# Patient Record
Sex: MALE | Race: WHITE | NOT HISPANIC OR LATINO | Employment: OTHER | URBAN - METROPOLITAN AREA
[De-identification: names, ages, dates, MRNs, and addresses within clinical notes are randomized per-mention and may not be internally consistent; named-entity substitution may affect disease eponyms.]

---

## 2017-01-10 DIAGNOSIS — I48.20 CHRONIC ATRIAL FIBRILLATION: ICD-10-CM

## 2017-01-10 RX ORDER — RIVAROXABAN 20 MG/1
TABLET, FILM COATED ORAL
Qty: 90 TABLET | Refills: 0 | OUTPATIENT
Start: 2017-01-10

## 2017-01-13 ENCOUNTER — TELEPHONE (OUTPATIENT)
Dept: CARDIOLOGY | Facility: CLINIC | Age: 82
End: 2017-01-13

## 2017-01-13 NOTE — TELEPHONE ENCOUNTER
Pt may proceed with flap procedure.  May hold Xarelto x 72 hours for procedure.  No other special precautions.    Dr Stewart

## 2017-01-13 NOTE — TELEPHONE ENCOUNTER
Patient needing clearance for gingival flap procedure. Does patient require premed antibiotic treatment? Does patient need to hold blood thinner? If so, how long? Are there any precautions that need to be taken prior to/post procedure? Please advise?

## 2017-01-14 DIAGNOSIS — I48.20 CHRONIC ATRIAL FIBRILLATION: ICD-10-CM

## 2017-01-14 RX ORDER — RIVAROXABAN 20 MG/1
TABLET, FILM COATED ORAL
Qty: 90 TABLET | Refills: 3 | Status: SHIPPED | OUTPATIENT
Start: 2017-01-14 | End: 2017-01-17 | Stop reason: SDUPTHER

## 2017-01-17 DIAGNOSIS — I48.20 CHRONIC ATRIAL FIBRILLATION: ICD-10-CM

## 2017-01-17 NOTE — TELEPHONE ENCOUNTER
----- Message from Lori Dahl sent at 1/17/2017 12:33 PM CST -----  Contact: dena wife   Rx for xeralto   .  [facility] [address] [place] [address] [address] [address] AT [facility] of  [address] [facility]  [address] [address] [address]  Phone: [phone] Fax: [phone]      Call back

## 2017-01-25 ENCOUNTER — OFFICE VISIT (OUTPATIENT)
Dept: INTERNAL MEDICINE | Facility: CLINIC | Age: 82
End: 2017-01-25
Payer: MEDICARE

## 2017-01-25 VITALS
HEART RATE: 83 BPM | OXYGEN SATURATION: 96 % | DIASTOLIC BLOOD PRESSURE: 82 MMHG | WEIGHT: 152.75 LBS | SYSTOLIC BLOOD PRESSURE: 126 MMHG | BODY MASS INDEX: 23.15 KG/M2 | HEIGHT: 68 IN

## 2017-01-25 DIAGNOSIS — I48.20 ATRIAL FIBRILLATION, CHRONIC: Chronic | ICD-10-CM

## 2017-01-25 DIAGNOSIS — I70.1 RENAL ARTERY ATHEROSCLEROSIS, BILATERAL: ICD-10-CM

## 2017-01-25 DIAGNOSIS — R41.3 MEMORY DEFICIT: ICD-10-CM

## 2017-01-25 DIAGNOSIS — M47.816 LUMBAR ARTHROPATHY: ICD-10-CM

## 2017-01-25 DIAGNOSIS — Z79.01 CHRONIC ANTICOAGULATION: Chronic | ICD-10-CM

## 2017-01-25 DIAGNOSIS — I25.83 CORONARY ARTERY DISEASE DUE TO LIPID RICH PLAQUE: Chronic | ICD-10-CM

## 2017-01-25 DIAGNOSIS — Z00.00 ENCOUNTER FOR PREVENTIVE HEALTH EXAMINATION: Primary | ICD-10-CM

## 2017-01-25 DIAGNOSIS — I77.9 BILATERAL CAROTID ARTERY DISEASE: Chronic | ICD-10-CM

## 2017-01-25 DIAGNOSIS — M81.0 OSTEOPOROSIS: ICD-10-CM

## 2017-01-25 DIAGNOSIS — I10 ESSENTIAL HYPERTENSION: Chronic | ICD-10-CM

## 2017-01-25 DIAGNOSIS — I70.0 AORTO-ILIAC ATHEROSCLEROSIS: Chronic | ICD-10-CM

## 2017-01-25 DIAGNOSIS — J84.10 CALCIFIED GRANULOMA OF LUNG: ICD-10-CM

## 2017-01-25 DIAGNOSIS — I25.10 CORONARY ARTERY DISEASE DUE TO LIPID RICH PLAQUE: Chronic | ICD-10-CM

## 2017-01-25 DIAGNOSIS — E78.2 MIXED HYPERLIPIDEMIA: Chronic | ICD-10-CM

## 2017-01-25 DIAGNOSIS — N40.1 BENIGN NON-NODULAR PROSTATIC HYPERPLASIA WITH LOWER URINARY TRACT SYMPTOMS: ICD-10-CM

## 2017-01-25 DIAGNOSIS — I70.8 AORTO-ILIAC ATHEROSCLEROSIS: Chronic | ICD-10-CM

## 2017-01-25 DIAGNOSIS — F32.89 OTHER DEPRESSION: ICD-10-CM

## 2017-01-25 DIAGNOSIS — R91.1 PULMONARY NODULE: ICD-10-CM

## 2017-01-25 DIAGNOSIS — I51.7 LEFT ATRIAL ENLARGEMENT: ICD-10-CM

## 2017-01-25 PROCEDURE — 3074F SYST BP LT 130 MM HG: CPT | Mod: S$GLB,,, | Performed by: PHYSICIAN ASSISTANT

## 2017-01-25 PROCEDURE — 99499 UNLISTED E&M SERVICE: CPT | Mod: S$GLB,,, | Performed by: PHYSICIAN ASSISTANT

## 2017-01-25 PROCEDURE — 3079F DIAST BP 80-89 MM HG: CPT | Mod: S$GLB,,, | Performed by: PHYSICIAN ASSISTANT

## 2017-01-25 PROCEDURE — 99999 PR PBB SHADOW E&M-EST. PATIENT-LVL III: CPT | Mod: PBBFAC,,, | Performed by: PHYSICIAN ASSISTANT

## 2017-01-25 PROCEDURE — G0439 PPPS, SUBSEQ VISIT: HCPCS | Mod: S$GLB,,, | Performed by: PHYSICIAN ASSISTANT

## 2017-01-25 NOTE — PATIENT INSTRUCTIONS
Counseling and Referral of Other Preventative  (Italic type indicates deductible and co-insurance are waived)    Patient Name: Lexa Hammond  Today's Date: 1/25/2017      SERVICE LIMITATIONS RECOMMENDATION    Vaccines    · Pneumococcal (once after 65)    · Influenza (annually)    · Hepatitis B (if medium/high risk)    · Prevnar 13      Hepatitis B medium/high risk factors:       - End-stage renal disease       - Hemophiliacs who received Factor VII or         IX concentrates       - Clients of institutions for the mentally             retarded       - Persons who live in the same house as          a HepB carrier       - Homosexual men       - Illicit injectable drug abusers     Pneumococcal: Recommended to patient, declined     Influenza: Recommended to patient, declined     Hepatitis B: N/A Follow with PCP     Prevnar 13: Recommended to patient, declined    Prostate cancer screening (annually to age 75)     Prostate specific antigen (PSA) Shared decision making with Provider. Sometimes a co-pay may be required if the patient decides to have this test. The USPSTF no longer recommends prostate cancer screening routinely in medicine: Follow with Dr. Simpson    Colorectal cancer screening (to age 75)    · Fecal occult blood test (annual)  · Flexible sigmoidoscopy (5y)  · Screening colonoscopy (10y)  · Barium enema   Last done 1/22/13, recommend to repeat every 5  years. Discuss with PCP and GI    Diabetes self-management training (no USPSTF recommendations)  Requires referral by treating physician for patient with diabetes or renal disease. 10 hours of initial DSMT sessions of no less than 30 minutes each in a continuous 12-month period. 2 hours of follow-up DSMT in subsequent years.  Continue to follow with PCP    Glaucoma screening (no USPSTF recommendation)  Diabetes mellitus, family history   , age 50 or over    American, age 65 or over  Continue to follow with Dr. Woody Guzman  nutrition therapy for diabetes or renal disease (no recommended schedule)  Requires referral by treating physician for patient with diabetes or renal disease or kidney transplant within the past 3 years.  Can be provided in same year as diabetes self-management training (DSMT), and CMS recommends medical nutrition therapy take place after DSMT. Up to 3 hours for initial year and 2 hours in subsequent years.  Continue to follow with PCP    Cardiovascular screening blood tests (every 5 years)  · Fasting lipid panel  Order as a panel if possible  Continue to follow with PCP    Diabetes screening tests (at least every 3 years, Medicare covers annually or at 6-month intervals for prediabetic patients)  · Fasting blood sugar (FBS) or glucose tolerance test (GTT)  Patient must be diagnosed with one of the following:       - Hypertension       - Dyslipidemia       - Obesity (BMI 30kg/m2)       - Previous elevated impaired FBS or GTT       ... or any two of the following:       - Overweight (BMI 25 but <30)       - Family history of diabetes       - Age 65 or older       - History of gestational diabetes or birth of baby weighing more than 9 pounds  Continue to follow with PCP    Abdominal aortic aneurysm screening (once)  · Sonogram   Limited to patients who meet one of the following criteria:       - Men who are 65-75 years old and have smoked more than 100 cigarette in their lifetime       - Anyone with a family history of abdominal aortic aneurysm       - Anyone recommended for screening by the USPSTF  Continue to follow with PCP    HIV screening (annually for increased risk patients)  · HIV-1 and HIV-2 by EIA, or ZACH, rapid antibody test or oral mucosa transudate  Patients must be at increased risk for HIV infection per USPSTF guidelines or pregnant. Tests covered annually for patient at increased risk or as requested by the patient. Pregnant patients may receive up to 3 tests during pregnancy.  Risks discussed,  screening is not recommended    Smoking cessation counseling (up to 8 sessions per year)  Patients must be asymptomatic of tobacco-related conditions to receive as a preventative service.  does not smoke    Subsequent annual wellness visit  At least 12 months since last AWV  Return in one year     The following information is provided to all patients.  This information is to help you find resources for any of the problems found today that may be affecting your health:                Living healthy guide: www.[facility].[place].[facility]      Understanding Diabetes: www.diabetes.org      Eating healthy: www.cdc.gov/healthyweight      [facility] home safety checklist: www.cdc.gov/steadi/patient.html      Agency on Aging: www.goea.[place]      Alcoholics anonymous (AA): www.aa.org      Physical Activity: www.linda.nih.gov/bz0eydv      Tobacco use: www.quitwithusla.org

## 2017-01-25 NOTE — MR AVS SNAPSHOT
[facility]  [address] [facility] [address] [place] [address]  Phone: [phone]  Fax: [phone]                  Lexa Hammond   2017 9:00 AM   Office Visit    Description:  Male : 1935   Provider:  Tello Holland PA-C   Department:  [facility]           Reason for Visit     Health Risk Assessment           Diagnoses this Visit        Comments    Encounter for preventive health examination    -  Primary     Atrial fibrillation, chronic         Essential hypertension         Bilateral carotid artery disease         Coronary artery disease due to lipid rich plaque         Pulmonary nodule         Aorto-iliac atherosclerosis         Renal artery atherosclerosis, bilateral         Osteoporosis         Calcified granuloma of lung         Lumbar arthropathy         Benign non-nodular prostatic hyperplasia with lower urinary tract symptoms         Memory deficit         Urinary complication         Mixed hyperlipidemia         Chronic anticoagulation                To Do List           Future Appointments        Provider Department Dept Phone    2017 8:00 AM Matthew Stewart MD [facility] Cardiology [phone]    3/6/2017 9:15 AM Lencho Cooper OD [facility] Ophthalmology [phone]    10/25/2017 8:20 AM Ren Guevara IV, MD [facility] Urology [phone]      Goals (5 Years of Data)     None      [facility]     [facility] Care [facility] -  Assistance  Registered nurses in the [facility] provide clinical advisement, health education, appointment booking, and other advisory services.  Call for this free service at [phone].             Medications           Message regarding Medications     Verify the changes and/or additions to your medication regime listed below are the same as discussed with your clinician today.  If any of these changes or additions are incorrect, please notify your healthcare provider.        STOP taking these  "medications     tamsulosin (FLOMAX) 0.4 mg Cp24 Take 1 capsule (0.4 mg total) by mouth once daily. 1 Capsule, Sust. Release 24 hr Oral Every day           Verify that the below list of medications is an accurate representation of the medications you are currently taking.  If none reported, the list may be blank. If incorrect, please contact your healthcare provider. Carry this list with you in case of emergency.           Current Medications     amlodipine (NORVASC) 5 MG tablet Take 1.5 tablets (7.5 mg total) by mouth once daily.    aspirin (ECOTRIN) 81 MG EC tablet Take 81 mg by mouth once daily.    co-enzyme Q-10 30 mg capsule Take 30 mg by mouth once daily.     ibandronate (BONIVA) 150 mg tablet TAKE 1 TABLET BY MOUTH ONCE MONTHLY ON AN EMPTY STOMACH AND STAY UPRIGHT FOR 30 MINUTES AFTER    multivitamin with minerals tablet Take 1 tablet by mouth once daily.    rivaroxaban (XARELTO) 20 mg Tab Take 1 tablet (20 mg total) by mouth once daily.    vitamin D 1000 units Tab Take 185 mg by mouth once daily.    betamethasone valerate 0.1% (VALISONE) 0.1 % Crea Apply topically 2 (two) times daily.    incontinence pad, liner, disp Pads            Clinical Reference Information           Vital Signs - Last Recorded  Most recent update: 1/25/2017  8:15 AM by Oralia Donald MA    BP Pulse Ht Wt SpO2 BMI    126/82 83 5' 8" (1.727 m) 69.3 kg (152 lb 12.5 oz) 96% 23.23 kg/m2      Blood Pressure          Most Recent Value    BP  126/82      Allergies as of 1/25/2017     Coumadin [Warfarin]      Immunizations Administered on Date of Encounter - 1/25/2017     None      Instructions      Counseling and Referral of Other Preventative  (Italic type indicates deductible and co-insurance are waived)    Patient Name: Lexa Hammond  Today's Date: 1/25/2017      SERVICE LIMITATIONS RECOMMENDATION    Vaccines    · Pneumococcal (once after 65)    · Influenza (annually)    · Hepatitis B (if medium/high risk)    · Prevnar 13      " Hepatitis B medium/high risk factors:       - End-stage renal disease       - Hemophiliacs who received Factor VII or         IX concentrates       - Clients of institutions for the mentally             retarded       - Persons who live in the same house as          a HepB carrier       - Homosexual men       - Illicit injectable drug abusers     Pneumococcal: Recommended to patient, declined     Influenza: Recommended to patient, declined     Hepatitis B: N/A Follow with PCP     Prevnar 13: Recommended to patient, declined    Prostate cancer screening (annually to age 75)     Prostate specific antigen (PSA) Shared decision making with Provider. Sometimes a co-pay may be required if the patient decides to have this test. The USPSTF no longer recommends prostate cancer screening routinely in medicine: Follow with Dr. Simpson    Colorectal cancer screening (to age 75)    · Fecal occult blood test (annual)  · Flexible sigmoidoscopy (5y)  · Screening colonoscopy (10y)  · Barium enema   Last done 1/22/13, recommend to repeat every 5  years. Discuss with PCP and GI    Diabetes self-management training (no USPSTF recommendations)  Requires referral by treating physician for patient with diabetes or renal disease. 10 hours of initial DSMT sessions of no less than 30 minutes each in a continuous 12-month period. 2 hours of follow-up DSMT in subsequent years.  Continue to follow with PCP    Glaucoma screening (no USPSTF recommendation)  Diabetes mellitus, family history   , age 50 or over    American, age 65 or over  Continue to follow with Dr. Mckay    Medical nutrition therapy for diabetes or renal disease (no recommended schedule)  Requires referral by treating physician for patient with diabetes or renal disease or kidney transplant within the past 3 years.  Can be provided in same year as diabetes self-management training (DSMT), and CMS recommends medical nutrition therapy take place after  DSMT. Up to 3 hours for initial year and 2 hours in subsequent years.  Continue to follow with PCP    Cardiovascular screening blood tests (every 5 years)  · Fasting lipid panel  Order as a panel if possible  Continue to follow with PCP    Diabetes screening tests (at least every 3 years, Medicare covers annually or at 6-month intervals for prediabetic patients)  · Fasting blood sugar (FBS) or glucose tolerance test (GTT)  Patient must be diagnosed with one of the following:       - Hypertension       - Dyslipidemia       - Obesity (BMI 30kg/m2)       - Previous elevated impaired FBS or GTT       ... or any two of the following:       - Overweight (BMI 25 but <30)       - Family history of diabetes       - Age 65 or older       - History of gestational diabetes or birth of baby weighing more than 9 pounds  Continue to follow with PCP    Abdominal aortic aneurysm screening (once)  · Sonogram   Limited to patients who meet one of the following criteria:       - Men who are 65-75 years old and have smoked more than 100 cigarette in their lifetime       - Anyone with a family history of abdominal aortic aneurysm       - Anyone recommended for screening by the USPSTF  Continue to follow with PCP    HIV screening (annually for increased risk patients)  · HIV-1 and HIV-2 by EIA, or ZACH, rapid antibody test or oral mucosa transudate  Patients must be at increased risk for HIV infection per USPSTF guidelines or pregnant. Tests covered annually for patient at increased risk or as requested by the patient. Pregnant patients may receive up to 3 tests during pregnancy.  Risks discussed, screening is not recommended    Smoking cessation counseling (up to 8 sessions per year)  Patients must be asymptomatic of tobacco-related conditions to receive as a preventative service.  does not smoke    Subsequent annual wellness visit  At least 12 months since last AWV  Return in one year     The following information is provided to all  patients.  This information is to help you find resources for any of the problems found today that may be affecting your health:                Living healthy guide: www.[facility].[place].[facility]      Understanding Diabetes: www.diabetes.org      Eating healthy: www.cdc.gov/healthyweight      CDC home safety checklist: www.cdc.gov/steadi/patient.html      Agency on Aging: www.goea.[place].[facility]      Alcoholics anonymous (AA): www.aa.org      Physical Activity: www.linda.nih.gov/iu8nuyk      Tobacco use: www.quitwithusla.org
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
## 2017-02-20 ENCOUNTER — OFFICE VISIT (OUTPATIENT)
Dept: CARDIOLOGY | Facility: CLINIC | Age: 82
End: 2017-02-20
Payer: MEDICARE

## 2017-02-20 VITALS
BODY MASS INDEX: 23.79 KG/M2 | SYSTOLIC BLOOD PRESSURE: 140 MMHG | DIASTOLIC BLOOD PRESSURE: 76 MMHG | HEIGHT: 68 IN | WEIGHT: 156.94 LBS

## 2017-02-20 DIAGNOSIS — I48.20 ATRIAL FIBRILLATION, CHRONIC: Primary | Chronic | ICD-10-CM

## 2017-02-20 DIAGNOSIS — R94.31 ABNORMAL ECG: Chronic | ICD-10-CM

## 2017-02-20 DIAGNOSIS — I10 ESSENTIAL HYPERTENSION: Chronic | ICD-10-CM

## 2017-02-20 DIAGNOSIS — I25.83 CORONARY ARTERY DISEASE DUE TO LIPID RICH PLAQUE: Chronic | ICD-10-CM

## 2017-02-20 DIAGNOSIS — I48.20 CHRONIC ATRIAL FIBRILLATION: ICD-10-CM

## 2017-02-20 DIAGNOSIS — I77.9 BILATERAL CAROTID ARTERY DISEASE: Chronic | ICD-10-CM

## 2017-02-20 DIAGNOSIS — Z79.01 CHRONIC ANTICOAGULATION: Chronic | ICD-10-CM

## 2017-02-20 DIAGNOSIS — I25.10 CORONARY ARTERY DISEASE DUE TO LIPID RICH PLAQUE: Chronic | ICD-10-CM

## 2017-02-20 DIAGNOSIS — I25.10 ATHEROSCLEROTIC HEART DISEASE OF NATIVE CORONARY ARTERY WITHOUT ANGINA PECTORIS: ICD-10-CM

## 2017-02-20 DIAGNOSIS — E78.2 MIXED HYPERLIPIDEMIA: Chronic | ICD-10-CM

## 2017-02-20 DIAGNOSIS — I25.9 CHRONIC ISCHEMIC HEART DISEASE: ICD-10-CM

## 2017-02-20 DIAGNOSIS — I36.1 NON-RHEUMATIC TRICUSPID VALVE INSUFFICIENCY: ICD-10-CM

## 2017-02-20 DIAGNOSIS — I70.0 AORTO-ILIAC ATHEROSCLEROSIS: Chronic | ICD-10-CM

## 2017-02-20 DIAGNOSIS — I70.8 AORTO-ILIAC ATHEROSCLEROSIS: Chronic | ICD-10-CM

## 2017-02-20 PROCEDURE — 3078F DIAST BP <80 MM HG: CPT | Mod: S$GLB,,, | Performed by: INTERNAL MEDICINE

## 2017-02-20 PROCEDURE — 1159F MED LIST DOCD IN RCRD: CPT | Mod: S$GLB,,, | Performed by: INTERNAL MEDICINE

## 2017-02-20 PROCEDURE — 93000 ELECTROCARDIOGRAM COMPLETE: CPT | Mod: S$GLB,,, | Performed by: NUCLEAR MEDICINE

## 2017-02-20 PROCEDURE — 99999 PR PBB SHADOW E&M-EST. PATIENT-LVL III: CPT | Mod: PBBFAC,,, | Performed by: INTERNAL MEDICINE

## 2017-02-20 PROCEDURE — 99499 UNLISTED E&M SERVICE: CPT | Mod: S$GLB,,, | Performed by: INTERNAL MEDICINE

## 2017-02-20 PROCEDURE — 1126F AMNT PAIN NOTED NONE PRSNT: CPT | Mod: S$GLB,,, | Performed by: INTERNAL MEDICINE

## 2017-02-20 PROCEDURE — 99214 OFFICE O/P EST MOD 30 MIN: CPT | Mod: S$GLB,,, | Performed by: INTERNAL MEDICINE

## 2017-02-20 PROCEDURE — 3077F SYST BP >= 140 MM HG: CPT | Mod: S$GLB,,, | Performed by: INTERNAL MEDICINE

## 2017-02-20 PROCEDURE — 1157F ADVNC CARE PLAN IN RCRD: CPT | Mod: S$GLB,,, | Performed by: INTERNAL MEDICINE

## 2017-02-20 NOTE — PROGRESS NOTES
Subjective:    Patient ID:  Lexa Hammond is a 81 y.o. male who presents for evaluation of Coronary Artery Disease (6 month followup); Hyperlipidemia; Hypertension; Carotid Artery Disease; and Atrial Fibrillation      HPI Mr. Hammond returns for f/u.   His current medical conditions include HTN, chronic atrial fibrillation, carotid artery disease. He had a CVA 2002. He had a R CEA by Dr. Angeles 12/09. He is a nonsmoker.  S/p  LHC 7/11 showed calcified CAD. Medical mgt recommended.   He did not tolerate Ranexa (rash). He has been intolerant of many meds to include many statins. Did not tolerate Imdur with headaches. He also stopped Metoprolol and Pravastatin for dizziness.   Pt here for f/u.  CAD is stable.  He reports no anginal sxs.  Denies cp or dyspnea.  Stress mpi June 2016 shows no ischemia.  Permanent a fib is stable.  He reports no dizziness.  No palpitations.    No abnl bleeding on xarelto.  ecg today has been reviewed and shows a fib with controlled VR, inferior infarct noted on old ecgs, low precordial r waves.  HTN controlled on current meds.  No associated sxs.  He is walking for exercise daily.  Carotid disease is stable.  No tia/cva sxs.  Compliant with meds.  Somewhat mindful of diet.  Lipids well controlled on diet.  PAD stable.  No claudication sxs.    Patient Active Problem List   Diagnosis    Benign prostatic hyperplasia with lower urinary tract symptoms    Abnormal ECG    Atrial fibrillation, chronic    Carotid artery disease    Coronary artery disease    ZACK (obstructive sleep apnea)    HTN (hypertension)    Osteoporosis    Mixed hyperlipidemia    Chronic anticoagulation    Aorto-iliac atherosclerosis    Renal artery atherosclerosis, bilateral    Tricuspid regurgitation    Pulmonary nodule    Abnormal stress test    Chronic ischemic heart disease    Laceration    Memory deficit    Depression    Calcified granuloma of lung    Lumbar arthropathy     Past Medical History    Diagnosis Date    Abnormal ECG 6/24/2013    Angina pectoris     Anticoagulant long-term use     Atrial fibrillation, chronic 6/24/2013      Cardioversion successful for 1 month only    Benign hematuria      ingerman 03    BPH (benign prostatic hyperplasia)      munoz    CAD (coronary artery disease)      burgos    Carotid artery disease 6/24/2013     olinde    Chronic ischemic heart disease 1/13/2016    Colon polyp 4/26/2015     Tubular adenoma 10/3/2007     Depression 10/7/2016    Diaphragmatic hernia without obstruction and without gangrene 4/26/2015     CT-7/8/13: Small hernia is noted posteromedially the right hemidiaphragm with some intrathoracic protrusion of abdominal fat.     Diverticulosis 4/26/2015 1/22/13 colo.     Hemiparesis and alteration of sensations as late effects of stroke      CVA 2002     HTN (hypertension)     Hyperlipidemia     Nephrolithiasis 3/9/10      left renal nonobstructing stone, Hxstones  every 20 years. passed.    Obstructive sleep apnea      dr berry    Osteoporosis, unspecified     Peripheral vascular disease 1/13/2016    Renal cyst, right 7/18/13     CT documented, known before [facility]    Stroke      right sided weakness    Trouble in sleeping     Urinary incontinence      occasional, and with stress/sneeze    Vertebral fracture 7/1/13     CT -indeterminate age L1 superior endplate comp fx.       Current Outpatient Prescriptions:     amlodipine (NORVASC) 5 MG tablet, Take 1.5 tablets (7.5 mg total) by mouth once daily., Disp: 135 tablet, Rfl: 3    aspirin (ECOTRIN) 81 MG EC tablet, Take 81 mg by mouth once daily., Disp: , Rfl:     co-enzyme Q-10 30 mg capsule, Take 30 mg by mouth once daily. , Disp: , Rfl:     ibandronate (BONIVA) 150 mg tablet, TAKE 1 TABLET BY MOUTH ONCE MONTHLY ON AN EMPTY STOMACH AND STAY UPRIGHT FOR 30 MINUTES AFTER, Disp: 3 tablet, Rfl: 11    multivitamin with minerals tablet, Take 1 tablet by mouth once  "daily., Disp: , Rfl:     rivaroxaban (XARELTO) 20 mg Tab, Take 1 tablet (20 mg total) by mouth once daily., Disp: 90 tablet, Rfl: 3    vitamin D 1000 units Tab, Take 185 mg by mouth once daily., Disp: , Rfl:     betamethasone valerate 0.1% (VALISONE) 0.1 % Crea, Apply topically 2 (two) times daily., Disp: 45 g, Rfl: 0    incontinence pad, liner, disp Pads, , Disp: , Rfl:           Review of Systems   Constitution: Negative.   HENT: Negative.    Eyes: Negative.    Cardiovascular: Negative.    Respiratory: Negative.    Endocrine: Negative.    Hematologic/Lymphatic: Negative.    Skin: Negative.    Musculoskeletal: Negative.    Gastrointestinal: Negative.    Genitourinary: Negative.    Neurological: Negative.    Psychiatric/Behavioral: Negative.    Allergic/Immunologic: Negative.      Visit Vitals    BP (!) 140/76 (BP Location: Right arm, Patient Position: Sitting, BP Method: Manual)    Ht 5' 8" (1.727 m)    Wt 71.2 kg (156 lb 15.5 oz)    BMI 23.87 kg/m2          Objective:    Physical Exam   Constitutional: He is oriented to person, place, and time. He appears well-developed and well-nourished.   HENT:   Head: Normocephalic.   Neck: Normal range of motion. Neck supple. Normal carotid pulses, no hepatojugular reflux and no JVD present. Carotid bruit is not present. No thyromegaly present.   Cardiovascular: Normal rate, S1 normal and S2 normal.  An irregularly irregular rhythm present. PMI is not displaced.  Exam reveals no S3, no S4, no distant heart sounds, no friction rub, no midsystolic click and no opening snap.    No murmur heard.  Pulses:       Radial pulses are 2+ on the right side, and 2+ on the left side.   Pulmonary/Chest: Effort normal and breath sounds normal. He has no wheezes. He has no rales.   Abdominal: Soft. Bowel sounds are normal. He exhibits no distension, no abdominal bruit, no ascites and no mass. There is no tenderness.   Musculoskeletal: He exhibits no edema.   Neurological: He is alert " and oriented to person, place, and time.   Skin: Skin is warm.   Psychiatric: He has a normal mood and affect. His behavior is normal.   Nursing note and vitals reviewed.    I have reviewed all pertinent labs and cardiac studies.    Lab Results   Component Value Date    CHOL 170 06/29/2016    CHOL 142 05/26/2015    CHOL 161 06/16/2014     Lab Results   Component Value Date    HDL 56 06/29/2016    HDL 54 05/26/2015    HDL 56 06/16/2014     Lab Results   Component Value Date    LDLCALC 98.2 06/29/2016    LDLCALC 79.0 05/26/2015    LDLCALC 95.2 06/16/2014     Lab Results   Component Value Date    TRIG 79 06/29/2016    TRIG 45 05/26/2015    TRIG 49 06/16/2014     Lab Results   Component Value Date    CHOLHDL 32.9 06/29/2016    CHOLHDL 38.0 05/26/2015    CHOLHDL 34.8 06/16/2014           Assessment:       1. Atrial fibrillation, chronic    2. Chronic ischemic heart disease    3. Non-rheumatic tricuspid valve insufficiency    4. Chronic anticoagulation    5. Mixed hyperlipidemia    6. Essential hypertension    7. Coronary artery disease due to lipid rich plaque    8. Abnormal ECG    9. Aorto-iliac atherosclerosis    10. Bilateral carotid artery disease    11. Atherosclerotic heart disease of native coronary artery without angina pectoris     12. Chronic atrial fibrillation         Plan:             STABLE CHRONIC CV CONDITIONS.  CONTINUE CURRENT MEDICAL TX.  CARDIAC DIET  DAILY EXERCISE  F/U 6 MONTHS WITH CAROTID US.

## 2017-02-20 NOTE — MR AVS SNAPSHOT
[facility] - Cardiology  [address]  [place] [address]  Phone: [phone]  Fax: [phone]                  Lexa Hammond   2017 8:00 AM   Office Visit    Description:  Male : 1935   Provider:  Matthew Stewart MD   Department:  [facility] - Cardiology           Reason for Visit     Coronary Artery Disease     Hyperlipidemia     Hypertension     Carotid Artery Disease     Atrial Fibrillation           Diagnoses this Visit        Comments    Atrial fibrillation, chronic    -  Primary     Chronic ischemic heart disease         Non-rheumatic tricuspid valve insufficiency         Chronic anticoagulation         Mixed hyperlipidemia         Essential hypertension         Coronary artery disease due to lipid rich plaque         Abnormal ECG         Aorto-iliac atherosclerosis         Bilateral carotid artery disease         Atherosclerotic heart disease of native coronary artery without angina pectoris         Chronic atrial fibrillation                To Do List           Future Appointments        Provider Department Dept Phone    3/6/2017 9:15 AM Lencho Cooper OD [facility] - Ophthalmology [phone]    10/25/2017 8:20 AM Ren Guevara IV, MD [facility] - Urology [phone]      Goals (5 Years of Data)     None      Follow-Up and Disposition     Return in about 6 months (around 2017).       These Medications        Disp Refills Start End    rivaroxaban (XARELTO) 20 mg Tab 90 tablet 3 2017     Take 1 tablet (20 mg total) by mouth once daily. - Oral    Pharmacy: [facility] [id number] [id number] [facility] [address] [facility]  [address] Ph #: [phone]         [facility] On Call     [facility] On Call Nurse Care Line -  Assistance  Registered nurses in the [facility] On [facility] Center provide clinical advisement, health education, appointment booking, and other advisory services.  Call for this free service at [phone].             Medications           Message  "regarding Medications     Verify the changes and/or additions to your medication regime listed below are the same as discussed with your clinician today.  If any of these changes or additions are incorrect, please notify your healthcare provider.             Verify that the below list of medications is an accurate representation of the medications you are currently taking.  If none reported, the list may be blank. If incorrect, please contact your healthcare provider. Carry this list with you in case of emergency.           Current Medications     amlodipine (NORVASC) 5 MG tablet Take 1.5 tablets (7.5 mg total) by mouth once daily.    aspirin (ECOTRIN) 81 MG EC tablet Take 81 mg by mouth once daily.    co-enzyme Q-10 30 mg capsule Take 30 mg by mouth once daily.     ibandronate (BONIVA) 150 mg tablet TAKE 1 TABLET BY MOUTH ONCE MONTHLY ON AN EMPTY STOMACH AND STAY UPRIGHT FOR 30 MINUTES AFTER    multivitamin with minerals tablet Take 1 tablet by mouth once daily.    rivaroxaban (XARELTO) 20 mg Tab Take 1 tablet (20 mg total) by mouth once daily.    vitamin D 1000 units Tab Take 185 mg by mouth once daily.    betamethasone valerate 0.1% (VALISONE) 0.1 % Crea Apply topically 2 (two) times daily.    incontinence pad, liner, disp Pads            Clinical Reference Information           Your Vitals Were     BP Height Weight BMI       140/76 (BP Location: Right arm, Patient Position: Sitting, BP Method: Manual) 5' 8" (1.727 m) 71.2 kg (156 lb 15.5 oz) 23.87 kg/m2       Blood Pressure          Most Recent Value    BP  (!)  140/76      Allergies as of 2/20/2017     Coumadin [Warfarin]      Immunizations Administered on Date of Encounter - 2/20/2017     None      Orders Placed During Today's Visit     Future Labs/Procedures Expected by Expires    CAR Ultrasound doppler carotid bliateral  8/20/2017 (Approximate) 2/20/2018      Language Assistance Services     ATTENTION: Language assistance services are available, free of " charge. Please call [phone].      ATENCIÓN: Si habla español, tiene a puga disposición servicios gratuitos de asistencia lingüística. Llame al [phone].     CHÚ Ý: N?u b?n nói Ti?ng Vi?t, có các d?ch v? h? tr? ngôn ng? mi?n phí dành cho b?n. G?i s? [phone].         [facility] - Cardiology complies with applicable Federal civil rights laws and does not discriminate on the basis of race, color, national origin, age, disability, or sex.

## 2017-03-06 ENCOUNTER — OFFICE VISIT (OUTPATIENT)
Dept: OPHTHALMOLOGY | Facility: CLINIC | Age: 82
End: 2017-03-06
Payer: MEDICARE

## 2017-03-06 DIAGNOSIS — Z96.1 PSEUDOPHAKIA OF BOTH EYES: ICD-10-CM

## 2017-03-06 DIAGNOSIS — H52.7 REFRACTIVE ERROR: ICD-10-CM

## 2017-03-06 DIAGNOSIS — H55.01 NYSTAGMUS, CONGENITAL: ICD-10-CM

## 2017-03-06 DIAGNOSIS — E70.319 OCULAR ALBINISM: Primary | ICD-10-CM

## 2017-03-06 PROCEDURE — 99999 PR PBB SHADOW E&M-EST. PATIENT-LVL I: CPT | Mod: PBBFAC,,, | Performed by: OPTOMETRIST

## 2017-03-06 PROCEDURE — 92014 COMPRE OPH EXAM EST PT 1/>: CPT | Mod: S$GLB,,, | Performed by: OPTOMETRIST

## 2017-03-06 PROCEDURE — 99499 UNLISTED E&M SERVICE: CPT | Mod: S$GLB,,, | Performed by: OPTOMETRIST

## 2017-03-06 PROCEDURE — 92015 DETERMINE REFRACTIVE STATE: CPT | Mod: S$GLB,,, | Performed by: OPTOMETRIST

## 2017-03-06 NOTE — PROGRESS NOTES
HPI     Last exam 2/29/16 with trf. No complaints     1. Pciol ou  2. Congenital nystagmus  3. Ocluar albinism  4. PCO   5. k dystrophy       Last edited by Oralia Gauthier MA on 3/6/2017  8:45 AM.         Assessment /Plan     For exam results, see Encounter Report.    Ocular albinism    Nystagmus, congenital    Pseudophakia of both eyes    Refractive error      No other pathology    Dispense Final Rx for glasses.  RTC 1 year

## 2017-03-15 ENCOUNTER — TELEPHONE (OUTPATIENT)
Dept: INTERNAL MEDICINE | Facility: CLINIC | Age: 82
End: 2017-03-15

## 2017-03-15 DIAGNOSIS — Z00.00 ANNUAL PHYSICAL EXAM: Primary | ICD-10-CM

## 2017-03-15 NOTE — TELEPHONE ENCOUNTER
----- Message from Johny De León sent at 3/15/2017 11:06 AM CDT -----  Contact: Pt wife - shan   States she is calling rg pt coming in for physical and is wanting to have his labs scheduled with her appt on the 4th and can be reached at [phone]//thanks/dbw

## 2017-03-15 NOTE — TELEPHONE ENCOUNTER
Patient is scheduled for an annual on 5/9/2016. Patient is requesting blood work prior to appointment. Please place orders

## 2017-04-06 RX ORDER — TAMSULOSIN HYDROCHLORIDE 0.4 MG/1
CAPSULE ORAL
Qty: 90 CAPSULE | Refills: 3 | Status: SHIPPED | OUTPATIENT
Start: 2017-04-06 | End: 2017-05-09 | Stop reason: ALTCHOICE

## 2017-04-18 ENCOUNTER — TELEPHONE (OUTPATIENT)
Dept: UROLOGY | Facility: CLINIC | Age: 82
End: 2017-04-18

## 2017-04-18 NOTE — TELEPHONE ENCOUNTER
Patient has chronic urinary retention due to urethral stricture. He is to catheterize once nightly. New catheter order faxed to [address] [place] [address].

## 2017-04-18 NOTE — TELEPHONE ENCOUNTER
----- Message from Vita Stewart sent at 4/18/2017  7:54 AM CDT -----  Contact: pt wife   Pt wife calling to get ordering information to order catheter for her  please call wife back at [phone]

## 2017-04-18 NOTE — TELEPHONE ENCOUNTER
Spoke with patient's wife and provided her with the number for [employer] to renew patient's catheter supplies.

## 2017-04-18 NOTE — TELEPHONE ENCOUNTER
----- Message from Emma Mejias sent at 4/18/2017 11:21 AM CDT -----  Contact: wife  Please call pt @ [phone] regarding order for pt cather, states pt never received them.

## 2017-05-04 ENCOUNTER — LAB VISIT (OUTPATIENT)
Dept: LAB | Facility: HOSPITAL | Age: 82
End: 2017-05-04
Attending: FAMILY MEDICINE
Payer: MEDICARE

## 2017-05-04 DIAGNOSIS — Z00.00 ANNUAL PHYSICAL EXAM: ICD-10-CM

## 2017-05-04 LAB
ALBUMIN SERPL BCP-MCNC: 3.8 G/DL
ALP SERPL-CCNC: 55 U/L
ALT SERPL W/O P-5'-P-CCNC: 25 U/L
ANION GAP SERPL CALC-SCNC: 8 MMOL/L
AST SERPL-CCNC: 30 U/L
BASOPHILS # BLD AUTO: 0.04 K/UL
BASOPHILS NFR BLD: 0.6 %
BILIRUB SERPL-MCNC: 1.2 MG/DL
BUN SERPL-MCNC: 16 MG/DL
CALCIUM SERPL-MCNC: 9.2 MG/DL
CHLORIDE SERPL-SCNC: 104 MMOL/L
CHOLEST/HDLC SERPL: 2.7 {RATIO}
CO2 SERPL-SCNC: 31 MMOL/L
CREAT SERPL-MCNC: 0.9 MG/DL
DIFFERENTIAL METHOD: ABNORMAL
EOSINOPHIL # BLD AUTO: 0.2 K/UL
EOSINOPHIL NFR BLD: 2.7 %
ERYTHROCYTE [DISTWIDTH] IN BLOOD BY AUTOMATED COUNT: 13.7 %
EST. GFR  (AFRICAN AMERICAN): >60 ML/MIN/1.73 M^2
EST. GFR  (NON AFRICAN AMERICAN): >60 ML/MIN/1.73 M^2
GLUCOSE SERPL-MCNC: 95 MG/DL
HCT VFR BLD AUTO: 46.3 %
HDL/CHOLESTEROL RATIO: 36.6 %
HDLC SERPL-MCNC: 153 MG/DL
HDLC SERPL-MCNC: 56 MG/DL
HGB BLD-MCNC: 15.5 G/DL
LDLC SERPL CALC-MCNC: 87.2 MG/DL
LYMPHOCYTES # BLD AUTO: 1.9 K/UL
LYMPHOCYTES NFR BLD: 30.4 %
MCH RBC QN AUTO: 31.9 PG
MCHC RBC AUTO-ENTMCNC: 33.5 %
MCV RBC AUTO: 95 FL
MONOCYTES # BLD AUTO: 0.6 K/UL
MONOCYTES NFR BLD: 9.6 %
NEUTROPHILS # BLD AUTO: 3.6 K/UL
NEUTROPHILS NFR BLD: 56.5 %
NONHDLC SERPL-MCNC: 97 MG/DL
PLATELET # BLD AUTO: 176 K/UL
PMV BLD AUTO: 9.9 FL
POTASSIUM SERPL-SCNC: 3.7 MMOL/L
PROT SERPL-MCNC: 6.9 G/DL
RBC # BLD AUTO: 4.86 M/UL
SODIUM SERPL-SCNC: 143 MMOL/L
TRIGL SERPL-MCNC: 49 MG/DL
WBC # BLD AUTO: 6.38 K/UL

## 2017-05-04 PROCEDURE — 85025 COMPLETE CBC W/AUTO DIFF WBC: CPT

## 2017-05-04 PROCEDURE — 36415 COLL VENOUS BLD VENIPUNCTURE: CPT | Mod: PO

## 2017-05-04 PROCEDURE — 80061 LIPID PANEL: CPT

## 2017-05-04 PROCEDURE — 80053 COMPREHEN METABOLIC PANEL: CPT

## 2017-05-09 ENCOUNTER — OFFICE VISIT (OUTPATIENT)
Dept: INTERNAL MEDICINE | Facility: CLINIC | Age: 82
End: 2017-05-09
Payer: MEDICARE

## 2017-05-09 VITALS
HEIGHT: 68 IN | TEMPERATURE: 97 F | HEART RATE: 74 BPM | OXYGEN SATURATION: 97 % | DIASTOLIC BLOOD PRESSURE: 82 MMHG | SYSTOLIC BLOOD PRESSURE: 130 MMHG | RESPIRATION RATE: 16 BRPM | BODY MASS INDEX: 22.22 KG/M2 | WEIGHT: 146.63 LBS

## 2017-05-09 DIAGNOSIS — Z00.00 ANNUAL PHYSICAL EXAM: Primary | ICD-10-CM

## 2017-05-09 DIAGNOSIS — Z28.39 IMMUNIZATION DEFICIENCY: ICD-10-CM

## 2017-05-09 PROCEDURE — G0009 ADMIN PNEUMOCOCCAL VACCINE: HCPCS | Mod: S$GLB,,, | Performed by: FAMILY MEDICINE

## 2017-05-09 PROCEDURE — 3075F SYST BP GE 130 - 139MM HG: CPT | Mod: S$GLB,,, | Performed by: FAMILY MEDICINE

## 2017-05-09 PROCEDURE — 99999 PR PBB SHADOW E&M-EST. PATIENT-LVL III: CPT | Mod: PBBFAC,,, | Performed by: FAMILY MEDICINE

## 2017-05-09 PROCEDURE — 99397 PER PM REEVAL EST PAT 65+ YR: CPT | Mod: S$GLB,,, | Performed by: FAMILY MEDICINE

## 2017-05-09 PROCEDURE — 3079F DIAST BP 80-89 MM HG: CPT | Mod: S$GLB,,, | Performed by: FAMILY MEDICINE

## 2017-05-09 PROCEDURE — 90732 PPSV23 VACC 2 YRS+ SUBQ/IM: CPT | Mod: S$GLB,,, | Performed by: FAMILY MEDICINE

## 2017-05-09 PROCEDURE — 99499 UNLISTED E&M SERVICE: CPT | Mod: S$GLB,,, | Performed by: FAMILY MEDICINE

## 2017-05-09 RX ORDER — IBANDRONATE SODIUM 150 MG/1
150 TABLET, FILM COATED ORAL
Qty: 3 TABLET | Refills: 3 | Status: SHIPPED | OUTPATIENT
Start: 2017-05-09 | End: 2018-07-13 | Stop reason: SDUPTHER

## 2017-05-09 NOTE — MR AVS SNAPSHOT
[facility] Internal Medicine  [address] [place] [address]  Phone: [phone]  Fax: [phone]                  Lexa Hammond   2017 9:00 AM   Office Visit    Description:  Male : 1935   Provider:  Sam Reyes MD   Department:  [facility] - Internal Medicine           Reason for Visit     Follow-up           Diagnoses this Visit        Comments    Annual physical exam    -  Primary Pt labs were very good and will continue on current medications.     Immunization deficiency                To Do List           Future Appointments        Provider Department Dept Phone    2017 9:00 AM Sam Reyes MD [facility] Internal Medicine [phone]    10/25/2017 8:20 AM Ren Guevara IV, MD [facility] - Urology [phone]      Goals (5 Years of Data)     None       These Medications        Disp Refills Start End    ibandronate (BONIVA) 150 mg tablet 3 tablet 3 2017     Take 1 tablet (150 mg total) by mouth every 30 days. - Oral    Pharmacy: [facility] [address] [place] [address] [address] Ph #: [phone]       Notes to Pharmacy: **Patient requests 90 days supply**      [facility] On Call     [facility] On Call Nurse Care Line -  Assistance  Unless otherwise directed by your provider, please contact [facility], our nurse care line that is available for  assistance.     Registered nurses in the [facility] On Call Center provide: appointment scheduling, clinical advisement, health education, and other advisory services.  Call: [phone] (toll free)               Medications           Message regarding Medications     Verify the changes and/or additions to your medication regime listed below are the same as discussed with your clinician today.  If any of these changes or additions are incorrect, please notify your healthcare provider.        CHANGE how you are taking these medications     Start Taking Instead of    ibandronate  "(BONIVA) 150 mg tablet ibandronate (BONIVA) 150 mg tablet    Dosage:  Take 1 tablet (150 mg total) by mouth every 30 days. Dosage:  TAKE 1 TABLET BY MOUTH ONCE MONTHLY ON AN EMPTY STOMACH AND STAY UPRIGHT FOR 30 MINUTES AFTER    Reason for Change:  Reorder       STOP taking these medications     betamethasone valerate 0.1% (VALISONE) 0.1 % Crea Apply topically 2 (two) times daily.    tamsulosin (FLOMAX) 0.4 mg Cp24 TAKE 1 CAPSULE EVERY DAY           Verify that the below list of medications is an accurate representation of the medications you are currently taking.  If none reported, the list may be blank. If incorrect, please contact your healthcare provider. Carry this list with you in case of emergency.           Current Medications     amlodipine (NORVASC) 5 MG tablet Take 1.5 tablets (7.5 mg total) by mouth once daily.    aspirin (ECOTRIN) 81 MG EC tablet Take 81 mg by mouth once daily.    co-enzyme Q-10 30 mg capsule Take 30 mg by mouth once daily.     ibandronate (BONIVA) 150 mg tablet Take 1 tablet (150 mg total) by mouth every 30 days.    incontinence pad, liner, disp Pads     multivitamin with minerals tablet Take 1 tablet by mouth once daily.    rivaroxaban (XARELTO) 20 mg Tab Take 1 tablet (20 mg total) by mouth once daily.    vitamin D 1000 units Tab Take 185 mg by mouth once daily.           Clinical Reference Information           Your Vitals Were     BP Pulse Temp Resp    130/82 (BP Location: Right arm, Patient Position: Sitting, BP Method: Manual) 74 97.2 °F (36.2 °C) (Tympanic) 16    Height Weight SpO2 BMI    5' 8" (1.727 m) 66.5 kg (146 lb 9.7 oz) 97% 22.29 kg/m2      Blood Pressure          Most Recent Value    BP  130/82      Allergies as of 5/9/2017     Coumadin [Warfarin]      Immunizations Administered on Date of Encounter - 5/9/2017     Name Date Dose VIS Date Route    Pneumococcal Polysaccharide - 23 Valent  Incomplete 0.5 mL 4/24/2015 Intramuscular      Orders Placed During Today's Visit  "     Normal Orders This Visit    Pneumococcal Polysaccharide Vaccine (23 Valent) (SQ/IM)       Language Assistance Services     ATTENTION: Language assistance services are available, free of charge. Please call [phone].      ATENCIÓN: Si lópez ho, tiene a puga disposición servicios gratuitos de asistencia lingüística. Llame al [phone].     CHÚ Ý: N?u b?n nói Ti?ng Vi?t, có các d?ch v? h? tr? ngôn ng? mi?n phí dành cho b?n. G?i s? [phone].         [facility] - Internal Medicine complies with applicable Federal civil rights laws and does not discriminate on the basis of race, color, national origin, age, disability, or sex.

## 2017-05-09 NOTE — PROGRESS NOTES
Subjective:       Patient ID: Lexa Hammond is a 81 y.o. male.    Chief Complaint: Follow-up    HPI Comments: Annual Exam:        Pt is a 81 year old who is here for HTN and cholesterol issues. Pt just had blood work and it is very good blood pressure is well controlled. Discussed with pt Pneumo. Pt was placed on celexa 20 mg a day but stopped due to some side effects.     Review of Systems   Constitutional: Negative.    HENT: Negative.    Respiratory: Negative.    Cardiovascular: Negative.    Gastrointestinal: Negative.    Genitourinary: Negative.    Skin: Negative.    Neurological: Negative.    Psychiatric/Behavioral: Positive for dysphoric mood. The patient is nervous/anxious.        Objective:      Physical Exam   Constitutional: He is oriented to person, place, and time. He appears well-developed and well-nourished.   Cardiovascular: Normal rate and regular rhythm.    Pulmonary/Chest: Effort normal and breath sounds normal.   Abdominal: Soft. Bowel sounds are normal.   Neurological: He is alert and oriented to person, place, and time.   Skin: Skin is warm and dry.   Psychiatric: He has a normal mood and affect. His behavior is normal.       Assessment:       1. Annual physical exam    2. Immunization deficiency        Plan:       Annual physical exam  Comments:  Pt labs were very good and will continue on current medications.     Immunization deficiency  -     Pneumococcal Polysaccharide Vaccine (23 Valent) (SQ/IM)    Other orders  -     ibandronate (BONIVA) 150 mg tablet; Take 1 tablet (150 mg total) by mouth every 30 days.  Dispense: 3 tablet; Refill: 3

## 2017-07-28 ENCOUNTER — OFFICE VISIT (OUTPATIENT)
Dept: URGENT CARE | Facility: CLINIC | Age: 82
End: 2017-07-28
Payer: MEDICARE

## 2017-07-28 ENCOUNTER — TELEPHONE (OUTPATIENT)
Dept: INTERNAL MEDICINE | Facility: CLINIC | Age: 82
End: 2017-07-28

## 2017-07-28 VITALS
OXYGEN SATURATION: 98 % | BODY MASS INDEX: 21.63 KG/M2 | WEIGHT: 142.75 LBS | HEIGHT: 68 IN | TEMPERATURE: 99 F | HEART RATE: 91 BPM | DIASTOLIC BLOOD PRESSURE: 80 MMHG | SYSTOLIC BLOOD PRESSURE: 144 MMHG

## 2017-07-28 DIAGNOSIS — L03.316 NAVEL CELLULITIS: Primary | ICD-10-CM

## 2017-07-28 PROCEDURE — 99999 PR PBB SHADOW E&M-EST. PATIENT-LVL IV: CPT | Mod: PBBFAC,,, | Performed by: NURSE PRACTITIONER

## 2017-07-28 PROCEDURE — 1126F AMNT PAIN NOTED NONE PRSNT: CPT | Mod: S$GLB,,, | Performed by: NURSE PRACTITIONER

## 2017-07-28 PROCEDURE — 99213 OFFICE O/P EST LOW 20 MIN: CPT | Mod: S$GLB,,, | Performed by: NURSE PRACTITIONER

## 2017-07-28 PROCEDURE — 1159F MED LIST DOCD IN RCRD: CPT | Mod: S$GLB,,, | Performed by: NURSE PRACTITIONER

## 2017-07-28 RX ORDER — MUPIROCIN 20 MG/G
OINTMENT TOPICAL 3 TIMES DAILY
Qty: 1 TUBE | Refills: 0 | Status: SHIPPED | OUTPATIENT
Start: 2017-07-28 | End: 2017-08-11

## 2017-07-28 NOTE — PATIENT INSTRUCTIONS
Cellulitis  Cellulitis is an infection of the deep layers of skin. A break in the skin, such as a cut or scratch, can let bacteria under the skin. If the bacteria get to deep layers of the skin, it can be serious. If not treated, cellulitis can get into the bloodstream and lymph nodes. The infection can then spread throughout the body. This causes serious illness.  Cellulitis causes the affected skin to become red, swollen, warm, and sore. The reddened areas have a visible border. An open sore may leak fluid (pus). You may have a fever, chills, and pain.  Cellulitis is treated with antibiotics taken for 7 to 10 days. An open sore may be cleaned and covered with cool wet gauze. Symptoms should get better 1 to 2 days after treatment is started. Make sure to take all the antibiotics for the full number of days until they are gone. Keep taking the medicine even if your symptoms go away.  Home care  Follow these tips:  · Limit the use of the part of your body with cellulitis.   · If the infection is on your leg, keep your leg raised while sitting. This will help to reduce swelling.  · Take all of the antibiotic medicine exactly as directed until it is gone. Do not miss any doses, especially during the first 7 days. Dont stop taking the medicine when your symptoms get better.  · Keep the affected area clean and dry.  · Wash your hands with soap and warm water before and after touching your skin. Anyone else who touches your skin should also wash his or her hands. Don't share towels.  Follow-up care  Follow up with your healthcare provider, or as advised. If your infection does not go away on the first antibiotic, your healthcare provider will prescribe a different one.  When to seek medical advice  Call your healthcare provider right away if any of these occur:  · Red areas that spread  · Swelling or pain that gets worse  · Fluid leaking from the skin (pus)  · Fever higher of 100.4º F (38.0º C) or higher after 2 days  on antibiotics  Date Last Reviewed: 9/1/2016  © 2555-7040 [employer]. [address] [place] [address], [place] [address]. All rights reserved. This information is not intended as a substitute for professional medical care. Always follow your healthcare professional's instructions.

## 2017-07-28 NOTE — TELEPHONE ENCOUNTER
----- Message from Jennifer Angela sent at 7/28/2017  1:19 PM CDT -----  Contact: [phone]  Would like to consult with nurse regarding having a cutting toe nail but next available appointment with podiatrist is 8/16/17. Would like to know Dr. Reyes nurse will be able to trim patients toenails. Please call back at [phone].  Thanks,  Jennifer Angela

## 2017-07-28 NOTE — TELEPHONE ENCOUNTER
Patient's wife informed that we are unable to cut his toenails and I advised against him going to a nail salon being that he is on a blood thinner.  She expressed understanding.

## 2017-07-28 NOTE — PROGRESS NOTES
Subjective:       Patient ID: Lexa Hammond is a 81 y.o. male.    Chief Complaint: No chief complaint on file.    Pt is a 81 year old male to clinic today with complaints of a foul odor and redness to his navel that began began Monday. Pt states he has been using q-tips and ETOH and applying neosporin since yesterday.       Rash   This is a new problem. The current episode started in the past 7 days. The problem is unchanged. Location: navel. The rash is characterized by burning, dryness and redness. He was exposed to nothing. Pertinent negatives include no congestion, cough, diarrhea, eye pain, facial edema, fatigue, fever, joint pain, nail changes, rhinorrhea, shortness of breath, sore throat or vomiting. Treatments tried: ETOH and neosporin. The treatment provided mild relief.     Review of Systems   Constitutional: Negative for chills, diaphoresis, fatigue and fever.   HENT: Negative for congestion, rhinorrhea, sinus pressure and sore throat.    Eyes: Negative for pain.   Respiratory: Negative for cough, chest tightness, shortness of breath and wheezing.    Cardiovascular: Negative for chest pain and palpitations.   Gastrointestinal: Negative for abdominal pain, diarrhea, nausea and vomiting.   Genitourinary: Negative for dysuria.   Musculoskeletal: Negative for back pain, joint pain, myalgias and neck pain.   Skin: Positive for rash. Negative for nail changes.   Neurological: Negative for dizziness, light-headedness and headaches.       Objective:      Physical Exam   Constitutional: He is oriented to person, place, and time. He appears well-developed and well-nourished. No distress.   HENT:   Head: Normocephalic.   Right Ear: External ear normal.   Left Ear: External ear normal.   Nose: Nose normal.   Eyes: Pupils are equal, round, and reactive to light.   Neurological: He is alert and oriented to person, place, and time.   Skin: Skin is warm and dry. No rash noted. He is not diaphoretic. There is  erythema.        Psychiatric: He has a normal mood and affect. His speech is normal and behavior is normal.   Nursing note and vitals reviewed.      Assessment:       1. Navel cellulitis        Plan:   Navel cellulitis  -     mupirocin (BACTROBAN) 2 % ointment; Apply topically 3 (three) times daily. Apply to affected area three times a day.  Dispense: 1 Tube; Refill: 0      Recommend clean area with peroxide and apply bactroban.  Recommend consider derm referral if no improvement.     Follow prescribed treatment plan as directed.  Stay hydrated and rest.  Report to ER if symptoms worsen.  Follow up with PCP in 2-3 days or sooner if symptoms do not improve.

## 2017-08-14 PROBLEM — Z00.00 ANNUAL PHYSICAL EXAM: Status: RESOLVED | Noted: 2017-05-09 | Resolved: 2017-08-14

## 2017-08-16 ENCOUNTER — OFFICE VISIT (OUTPATIENT)
Dept: PODIATRY | Facility: CLINIC | Age: 82
End: 2017-08-16

## 2017-08-16 VITALS
HEART RATE: 70 BPM | BODY MASS INDEX: 21.78 KG/M2 | WEIGHT: 143.75 LBS | DIASTOLIC BLOOD PRESSURE: 90 MMHG | SYSTOLIC BLOOD PRESSURE: 150 MMHG | HEIGHT: 68 IN

## 2017-08-16 DIAGNOSIS — B35.1 DERMATOPHYTOSIS OF NAIL: Primary | ICD-10-CM

## 2017-08-16 PROCEDURE — 99999 PR PBB SHADOW E&M-EST. PATIENT-LVL III: CPT | Mod: PBBFAC,,, | Performed by: PODIATRIST

## 2017-08-16 PROCEDURE — 17999 UNLISTD PX SKN MUC MEMB SUBQ: CPT | Mod: CSM,S$GLB,, | Performed by: PODIATRIST

## 2017-08-16 PROCEDURE — 99499 UNLISTED E&M SERVICE: CPT | Mod: S$GLB,,, | Performed by: PODIATRIST

## 2017-08-16 RX ORDER — SERTRALINE HYDROCHLORIDE 25 MG/1
TABLET, FILM COATED ORAL
COMMUNITY
Start: 2017-07-17 | End: 2018-01-26

## 2017-08-16 RX ORDER — RESVER/WINE/BFL/GRPSD/PC/C/POM 200MG-60MG
5000 CAPSULE ORAL DAILY
COMMUNITY
Start: 2017-05-10 | End: 2020-12-01

## 2017-08-16 RX ORDER — UBIDECARENONE 30 MG
30 CAPSULE ORAL DAILY
COMMUNITY
Start: 2017-07-01 | End: 2020-12-01

## 2017-08-16 RX ORDER — MIRTAZAPINE 15 MG/1
TABLET, FILM COATED ORAL
COMMUNITY
Start: 2017-07-17 | End: 2018-01-26

## 2017-08-16 NOTE — PROGRESS NOTES
PODIATRY NOTE    CHIEF COMPLAINT   Non-medical covered nail care    HPI:    Lexa Hammond is a 82 y.o. male presenting to podiatry clinic with complaint of progressive thickening and discoloration of the nails. The patient relates they are unable to trim their toenails due to thickness and difficulty. Toenails are painful and aggravated in shoewear.      PMH  Past Medical History:   Diagnosis Date    Abnormal ECG 6/24/2013    Angina pectoris     Anticoagulant long-term use     Atrial fibrillation, chronic 6/24/2013     Cardioversion successful for 1 month only    Benign hematuria     ingerman 03    BPH (benign prostatic hyperplasia)     munoz    CAD (coronary artery disease)     burgos    Carotid artery disease 6/24/2013    olinde    Chronic ischemic heart disease 1/13/2016    Colon polyp 4/26/2015    Tubular adenoma 10/3/2007     Depression 10/7/2016    Diaphragmatic hernia without obstruction and without gangrene 4/26/2015    CT-7/8/13: Small hernia is noted posteromedially the right hemidiaphragm with some intrathoracic protrusion of abdominal fat.     Diverticulosis 4/26/2015 1/22/13 colo.     Hemiparesis and alteration of sensations as late effects of stroke     CVA 2002     HTN (hypertension)     Hyperlipidemia     Nephrolithiasis 3/9/10    US left renal nonobstructing stone, Hxstones  every 20 years. passed.    Obstructive sleep apnea     dr berry    Osteoporosis, unspecified     Peripheral vascular disease 1/13/2016    Renal cyst, right 7/18/13    CT documented, known before [facility]    Stroke     right sided weakness    Trouble in sleeping     Urinary incontinence     occasional, and with stress/sneeze    Vertebral fracture 7/1/13    CT -indeterminate age L1 superior endplate comp fx.       PROBLEM LIST  Patient Active Problem List    Diagnosis Date Noted    Calcified granuloma of lung 01/25/2017    Lumbar arthropathy 01/25/2017    Memory deficit 10/07/2016     "Depression 10/07/2016    Laceration 04/01/2016    Tricuspid regurgitation 01/13/2016    Pulmonary nodule 01/13/2016    Abnormal stress test 01/13/2016    Chronic ischemic heart disease 01/13/2016    Renal artery atherosclerosis, bilateral 04/26/2015    Aorto-iliac atherosclerosis 04/24/2015    Chronic anticoagulation 06/30/2014    Mixed hyperlipidemia 12/23/2013    HTN (hypertension)     Osteoporosis     Benign prostatic hyperplasia with lower urinary tract symptoms 06/24/2013    Abnormal ECG 06/24/2013    Atrial fibrillation, chronic 06/24/2013    Carotid artery disease 06/24/2013    Coronary artery disease 06/24/2013    ZACK (obstructive sleep apnea) 06/24/2013       MEDS  Current Outpatient Prescriptions on File Prior to Visit   Medication Sig Dispense Refill    amlodipine (NORVASC) 5 MG tablet Take 1.5 tablets (7.5 mg total) by mouth once daily. 135 tablet 3    aspirin (ECOTRIN) 81 MG EC tablet Take 81 mg by mouth once daily.      ibandronate (BONIVA) 150 mg tablet Take 1 tablet (150 mg total) by mouth every 30 days. 3 tablet 3    incontinence pad, liner, disp Pads       multivitamin with minerals tablet Take 1 tablet by mouth once daily.      rivaroxaban (XARELTO) 20 mg Tab Take 1 tablet (20 mg total) by mouth once daily. 90 tablet 3    [DISCONTINUED] co-enzyme Q-10 30 mg capsule Take 30 mg by mouth once daily.       [DISCONTINUED] vitamin D 1000 units Tab Take 185 mg by mouth once daily.       No current facility-administered medications on file prior to visit.        ALL  Review of patient's allergies indicates:   Allergen Reactions    Coumadin [warfarin] Other (See Comments)     Problems with anticoagulation, on x 6 years, Doing better w/Xaralto           PHYSICAL EXAM:      Vitals:    08/16/17 0913   BP: (!) 150/90   Pulse: 70   Weight: 65.2 kg (143 lb 11.8 oz)   Height: 5' 8" (1.727 m)   PainSc: 0-No pain         LOWER EXTREMITY  Dermatologic:   · Nails 1-5 bilateral thickened, " elongated, dystrophic, with subungual debris      ASSESSMENT   1. Dermatophytosis     PLAN    1. With patient's permission, nails were aggressively reduced and debrided x 10 to their soft tissue attachment mechanically and with electric , removing all offending nail and debris. Patient relates relief following the procedure.   -I advised the patient that for routine nail care or callus coverage is not covered under their insurance therefore we will enroll the patient in PROC B cosmetic foot care. The patient understands that they will be fully responsible for the bill as cosmetic foot care is not a covered service for their insurance.   3. RTC PRN    Report Electronically Signed By:  Alanis Paz DPM   Podiatric Medicine & Surgery  [facility]  8/16/2017

## 2017-08-23 ENCOUNTER — CLINICAL SUPPORT (OUTPATIENT)
Dept: CARDIOLOGY | Facility: CLINIC | Age: 82
End: 2017-08-23
Payer: MEDICARE

## 2017-08-23 DIAGNOSIS — I25.10 ATHEROSCLEROTIC HEART DISEASE OF NATIVE CORONARY ARTERY WITHOUT ANGINA PECTORIS: ICD-10-CM

## 2017-08-23 DIAGNOSIS — I77.9 BILATERAL CAROTID ARTERY DISEASE: Chronic | ICD-10-CM

## 2017-08-23 LAB — INTERNAL CAROTID STENOSIS: ABNORMAL

## 2017-08-23 PROCEDURE — 93880 EXTRACRANIAL BILAT STUDY: CPT | Mod: S$GLB,,, | Performed by: INTERNAL MEDICINE

## 2017-09-05 ENCOUNTER — CLINICAL SUPPORT (OUTPATIENT)
Dept: CARDIOLOGY | Facility: CLINIC | Age: 82
End: 2017-09-05
Payer: MEDICARE

## 2017-09-05 ENCOUNTER — OFFICE VISIT (OUTPATIENT)
Dept: CARDIOLOGY | Facility: CLINIC | Age: 82
End: 2017-09-05
Payer: MEDICARE

## 2017-09-05 ENCOUNTER — LAB VISIT (OUTPATIENT)
Dept: LAB | Facility: HOSPITAL | Age: 82
End: 2017-09-05
Attending: INTERNAL MEDICINE
Payer: MEDICARE

## 2017-09-05 VITALS
SYSTOLIC BLOOD PRESSURE: 140 MMHG | HEIGHT: 68 IN | BODY MASS INDEX: 21.99 KG/M2 | HEART RATE: 75 BPM | DIASTOLIC BLOOD PRESSURE: 80 MMHG | WEIGHT: 145.06 LBS

## 2017-09-05 DIAGNOSIS — I77.9 BILATERAL CAROTID ARTERY DISEASE: Chronic | ICD-10-CM

## 2017-09-05 DIAGNOSIS — I25.83 CORONARY ARTERY DISEASE DUE TO LIPID RICH PLAQUE: Chronic | ICD-10-CM

## 2017-09-05 DIAGNOSIS — R42 DIZZINESS: ICD-10-CM

## 2017-09-05 DIAGNOSIS — R94.31 ABNORMAL ECG: Chronic | ICD-10-CM

## 2017-09-05 DIAGNOSIS — E78.2 MIXED HYPERLIPIDEMIA: Chronic | ICD-10-CM

## 2017-09-05 DIAGNOSIS — I25.10 CORONARY ARTERY DISEASE DUE TO LIPID RICH PLAQUE: Chronic | ICD-10-CM

## 2017-09-05 DIAGNOSIS — I25.9 CHRONIC ISCHEMIC HEART DISEASE: ICD-10-CM

## 2017-09-05 DIAGNOSIS — I48.20 ATRIAL FIBRILLATION, CHRONIC: Primary | Chronic | ICD-10-CM

## 2017-09-05 DIAGNOSIS — I48.20 ATRIAL FIBRILLATION, CHRONIC: Chronic | ICD-10-CM

## 2017-09-05 DIAGNOSIS — I10 ESSENTIAL HYPERTENSION: Chronic | ICD-10-CM

## 2017-09-05 DIAGNOSIS — Z79.01 CHRONIC ANTICOAGULATION: Chronic | ICD-10-CM

## 2017-09-05 DIAGNOSIS — I36.1 NON-RHEUMATIC TRICUSPID VALVE INSUFFICIENCY: ICD-10-CM

## 2017-09-05 LAB
ALBUMIN SERPL BCP-MCNC: 4 G/DL
ALP SERPL-CCNC: 67 U/L
ALT SERPL W/O P-5'-P-CCNC: 13 U/L
ANION GAP SERPL CALC-SCNC: 8 MMOL/L
AST SERPL-CCNC: 23 U/L
BASOPHILS # BLD AUTO: 0.05 K/UL
BASOPHILS NFR BLD: 0.7 %
BILIRUB SERPL-MCNC: 1.1 MG/DL
BUN SERPL-MCNC: 13 MG/DL
CALCIUM SERPL-MCNC: 9.7 MG/DL
CHLORIDE SERPL-SCNC: 101 MMOL/L
CO2 SERPL-SCNC: 31 MMOL/L
CREAT SERPL-MCNC: 0.8 MG/DL
DIFFERENTIAL METHOD: ABNORMAL
EOSINOPHIL # BLD AUTO: 0.2 K/UL
EOSINOPHIL NFR BLD: 3 %
ERYTHROCYTE [DISTWIDTH] IN BLOOD BY AUTOMATED COUNT: 13.6 %
EST. GFR  (AFRICAN AMERICAN): >60 ML/MIN/1.73 M^2
EST. GFR  (NON AFRICAN AMERICAN): >60 ML/MIN/1.73 M^2
GLUCOSE SERPL-MCNC: 82 MG/DL
HCT VFR BLD AUTO: 47.3 %
HGB BLD-MCNC: 15.8 G/DL
LYMPHOCYTES # BLD AUTO: 2.3 K/UL
LYMPHOCYTES NFR BLD: 30.9 %
MCH RBC QN AUTO: 31.7 PG
MCHC RBC AUTO-ENTMCNC: 33.4 G/DL
MCV RBC AUTO: 95 FL
MONOCYTES # BLD AUTO: 0.7 K/UL
MONOCYTES NFR BLD: 10.1 %
NEUTROPHILS # BLD AUTO: 4 K/UL
NEUTROPHILS NFR BLD: 55 %
PLATELET # BLD AUTO: 183 K/UL
PMV BLD AUTO: 9.5 FL
POTASSIUM SERPL-SCNC: 4.8 MMOL/L
PROT SERPL-MCNC: 7.8 G/DL
RBC # BLD AUTO: 4.98 M/UL
SODIUM SERPL-SCNC: 140 MMOL/L
WBC # BLD AUTO: 7.31 K/UL

## 2017-09-05 PROCEDURE — 99499 UNLISTED E&M SERVICE: CPT | Mod: S$GLB,,, | Performed by: INTERNAL MEDICINE

## 2017-09-05 PROCEDURE — 93224 XTRNL ECG REC UP TO 48 HRS: CPT | Mod: S$GLB,,, | Performed by: INTERNAL MEDICINE

## 2017-09-05 PROCEDURE — 93000 ELECTROCARDIOGRAM COMPLETE: CPT | Mod: S$GLB,,, | Performed by: INTERNAL MEDICINE

## 2017-09-05 PROCEDURE — 80053 COMPREHEN METABOLIC PANEL: CPT

## 2017-09-05 PROCEDURE — 99214 OFFICE O/P EST MOD 30 MIN: CPT | Mod: S$GLB,,, | Performed by: INTERNAL MEDICINE

## 2017-09-05 PROCEDURE — 3079F DIAST BP 80-89 MM HG: CPT | Mod: S$GLB,,, | Performed by: INTERNAL MEDICINE

## 2017-09-05 PROCEDURE — 3077F SYST BP >= 140 MM HG: CPT | Mod: S$GLB,,, | Performed by: INTERNAL MEDICINE

## 2017-09-05 PROCEDURE — 85025 COMPLETE CBC W/AUTO DIFF WBC: CPT

## 2017-09-05 PROCEDURE — 1159F MED LIST DOCD IN RCRD: CPT | Mod: S$GLB,,, | Performed by: INTERNAL MEDICINE

## 2017-09-05 PROCEDURE — 3008F BODY MASS INDEX DOCD: CPT | Mod: S$GLB,,, | Performed by: INTERNAL MEDICINE

## 2017-09-05 PROCEDURE — 99999 PR PBB SHADOW E&M-EST. PATIENT-LVL III: CPT | Mod: PBBFAC,,, | Performed by: INTERNAL MEDICINE

## 2017-09-05 PROCEDURE — 36415 COLL VENOUS BLD VENIPUNCTURE: CPT | Mod: PO

## 2017-09-05 NOTE — PROGRESS NOTES
Subjective:    Patient ID:  Lexa Hammond is a 82 y.o. male who presents for evaluation of Carotid Artery Disease; Hypertension; Hyperlipidemia; Coronary Artery Disease; Atrial Fibrillation; and Dizziness      HPI Mr. Hammond returns for f/u.   His current medical conditions include HTN, chronic atrial fibrillation, carotid artery disease. He had a CVA 2002. He had a R CEA by Dr. Angeles 12/09. He is a nonsmoker.  S/p  LHC 7/11 showed calcified CAD. Medical mgt recommended.   He did not tolerate Ranexa (rash). He has been intolerant of many meds to include many statins. Did not tolerate Imdur with headaches. He also stopped Metoprolol and Pravastatin for dizziness.   Negative stress MPI June 2016.  Pt here for f/u.  States he gets dizzy at times, variable timing, lasts variable amounts of time.  No presyncope or syncope.  Not exertional.  ecg today shows a fib with controlled VR, isolated PVC.  Carotid u/s is stable, < 50% stenosis bilaterally.  No chest pains or dyspnea.  No tia/cva sxs.  No abnl bleeding issues on Xarelto.  Lipids well controlled.  Has been intolerant to statins.  BP is stable on current medical tx.       Patient Active Problem List   Diagnosis    Benign prostatic hyperplasia with lower urinary tract symptoms    Abnormal ECG    Atrial fibrillation, chronic    Carotid artery disease    Coronary artery disease    ZACK (obstructive sleep apnea)    HTN (hypertension)    Osteoporosis    Mixed hyperlipidemia    Chronic anticoagulation    Aorto-iliac atherosclerosis    Renal artery atherosclerosis, bilateral    Tricuspid regurgitation    Pulmonary nodule    Chronic ischemic heart disease    Laceration    Memory deficit    Depression    Calcified granuloma of lung    Lumbar arthropathy    Dizziness     Past Medical History:   Diagnosis Date    Abnormal ECG 6/24/2013    Angina pectoris     Anticoagulant long-term use     Atrial fibrillation, chronic 6/24/2013     Cardioversion  successful for 1 month only    Benign hematuria     ingerman 03    BPH (benign prostatic hyperplasia)     munoz    CAD (coronary artery disease)     loretta    Carotid artery disease 6/24/2013    olinddom    Chronic ischemic heart disease 1/13/2016    Colon polyp 4/26/2015    Tubular adenoma 10/3/2007     Depression 10/7/2016    Diaphragmatic hernia without obstruction and without gangrene 4/26/2015    CT-7/8/13: Small hernia is noted posteromedially the right hemidiaphragm with some intrathoracic protrusion of abdominal fat.     Diverticulosis 4/26/2015 1/22/13 colo.     Hemiparesis and alteration of sensations as late effects of stroke     CVA 2002     HTN (hypertension)     Hyperlipidemia     Nephrolithiasis 3/9/10    US left renal nonobstructing stone, Hxstones  every 20 years. passed.    Obstructive sleep apnea     dr berry    Osteoporosis, unspecified     Peripheral vascular disease 1/13/2016    Renal cyst, right 7/18/13    CT documented, known before [facility]    Stroke     right sided weakness    Trouble in sleeping     Urinary incontinence     occasional, and with stress/sneeze    Vertebral fracture 7/1/13    CT -indeterminate age L1 superior endplate comp fx.       Current Outpatient Prescriptions:     amlodipine (NORVASC) 5 MG tablet, Take 1.5 tablets (7.5 mg total) by mouth once daily., Disp: 135 tablet, Rfl: 3    aspirin (ECOTRIN) 81 MG EC tablet, Take 81 mg by mouth once daily., Disp: , Rfl:     co-enzyme Q-10 30 mg capsule, , Disp: , Rfl:     ibandronate (BONIVA) 150 mg tablet, Take 1 tablet (150 mg total) by mouth every 30 days., Disp: 3 tablet, Rfl: 3    incontinence pad, liner, disp Pads, , Disp: , Rfl:     mirtazapine (REMERON) 15 MG tablet, , Disp: , Rfl:     multivitamin with minerals tablet, Take 1 tablet by mouth once daily., Disp: , Rfl:     rivaroxaban (XARELTO) 20 mg Tab, Take 1 tablet (20 mg total) by mouth once daily., Disp: 90 tablet, Rfl: 3     "sertraline (ZOLOFT) 25 MG tablet, , Disp: , Rfl:     VITAMIN D3 5,000 unit Tab, , Disp: , Rfl:       Review of Systems   Constitution: Negative.   HENT: Negative.    Eyes: Negative.    Cardiovascular: Negative.    Respiratory: Negative.    Endocrine: Negative.    Hematologic/Lymphatic: Negative.    Skin: Negative.    Musculoskeletal: Positive for arthritis and back pain.   Gastrointestinal: Negative.    Genitourinary: Negative.    Neurological: Positive for dizziness and light-headedness.   Psychiatric/Behavioral: Negative.    Allergic/Immunologic: Negative.        BP (!) 140/80   Pulse 75   Ht 5' 8" (1.727 m)   Wt 65.8 kg (145 lb 1 oz)   BMI 22.06 kg/m²     Wt Readings from Last 3 Encounters:   09/05/17 65.8 kg (145 lb 1 oz)   08/16/17 65.2 kg (143 lb 11.8 oz)   07/28/17 64.8 kg (142 lb 12 oz)     Temp Readings from Last 3 Encounters:   07/28/17 98.7 °F (37.1 °C)   05/09/17 97.2 °F (36.2 °C) (Tympanic)   10/07/16 (!) 95.8 °F (35.4 °C) (Tympanic)     BP Readings from Last 3 Encounters:   09/05/17 (!) 140/80   08/16/17 (!) 150/90   07/28/17 (!) 144/80     Pulse Readings from Last 3 Encounters:   09/05/17 75   08/16/17 70   07/28/17 91          Objective:    Physical Exam   Constitutional: He is oriented to person, place, and time. He appears well-developed and well-nourished.   HENT:   Head: Normocephalic.   Neck: Normal range of motion. Neck supple. Normal carotid pulses, no hepatojugular reflux and no JVD present. Carotid bruit is not present. No thyromegaly present.   Cardiovascular: Normal rate, S1 normal and S2 normal.  An irregularly irregular rhythm present. PMI is not displaced.  Exam reveals no S3, no S4, no distant heart sounds, no friction rub, no midsystolic click and no opening snap.    No murmur heard.  Pulses:       Radial pulses are 2+ on the right side, and 2+ on the left side.   Pulmonary/Chest: Effort normal and breath sounds normal. He has no wheezes. He has no rales.   Abdominal: Soft. Bowel " sounds are normal. He exhibits no distension, no abdominal bruit, no ascites and no mass. There is no tenderness.   Musculoskeletal: He exhibits no edema.   Neurological: He is alert and oriented to person, place, and time.   Skin: Skin is warm.   Psychiatric: He has a normal mood and affect. His behavior is normal.   Nursing note and vitals reviewed.  .      I have reviewed all pertinent labs and cardiac studies.      Chemistry        Component Value Date/Time     05/04/2017 0719    K 3.7 05/04/2017 0719     05/04/2017 0719    CO2 31 (H) 05/04/2017 0719    BUN 16 05/04/2017 0719    CREATININE 0.9 05/04/2017 0719    GLU 95 05/04/2017 0719        Component Value Date/Time    CALCIUM 9.2 05/04/2017 0719    ALKPHOS 55 05/04/2017 0719    AST 30 05/04/2017 0719    ALT 25 05/04/2017 0719    BILITOT 1.2 (H) 05/04/2017 0719    ESTGFRAFRICA >60.0 05/04/2017 0719    EGFRNONAA >60.0 05/04/2017 0719        Lab Results   Component Value Date    WBC 6.38 05/04/2017    HGB 15.5 05/04/2017    HCT 46.3 05/04/2017    MCV 95 05/04/2017     05/04/2017     Lab Results   Component Value Date    HGBA1C 5.9 10/18/2012     Lab Results   Component Value Date    CHOL 153 05/04/2017    CHOL 170 06/29/2016    CHOL 142 05/26/2015     Lab Results   Component Value Date    HDL 56 05/04/2017    HDL 56 06/29/2016    HDL 54 05/26/2015     Lab Results   Component Value Date    LDLCALC 87.2 05/04/2017    LDLCALC 98.2 06/29/2016    LDLCALC 79.0 05/26/2015     Lab Results   Component Value Date    TRIG 49 05/04/2017    TRIG 79 06/29/2016    TRIG 45 05/26/2015     Lab Results   Component Value Date    CHOLHDL 36.6 05/04/2017    CHOLHDL 32.9 06/29/2016    CHOLHDL 38.0 05/26/2015         RIGHT  The right Proximal Common Carotid Artery is visualized.   The right carotid bulb artery is visualized.   The right Distal Internal Carotid Artery has 20 - 39% stenosis.   There is acceleration in the right external carotid artery.   The right  vertebral artery is visualized, associated with anterograde flow.   The right ICA/CCA ratio is: 1.75    LEFT  There is acceleration in the left Proximal Common Carotid Artery.   The left carotid bulb artery is visualized.   The left Proximal Internal Carotid Artery has 40 - 49% stenosis, associated with heterogeneous plaque.   There is acceleration in the left external carotid artery.   The left vertebral artery is visualized, associated with anterograde flow.   The left ICA/CCA ratio is: 1.57      CONCLUSIONS   There is 20 - 39% right Internal Carotid stenosis.  There is 40 - 49% left Internal Carotid stenosis.          This document has been electronically    SIGNED BY: Matthew Stewart MD On: 08/23/2017 09:41  Assessment:       1. Atrial fibrillation, chronic    2. Non-rheumatic tricuspid valve insufficiency    3. Mixed hyperlipidemia    4. Essential hypertension    5. Coronary artery disease due to lipid rich plaque    6. Chronic anticoagulation    7. Chronic ischemic heart disease    8. Abnormal ECG    9. Bilateral carotid artery disease    10. Dizziness         Plan:             UNCLEAR ETIOLOGY OF DIZZINESS.  NO FOCAL EXAM FINDINGS.  STABLE ECG WITH CONTROLLED A FIB.  CHECK CBC TO ENSURE NO SIGNIFICANT ANEMIA SINCE PT IS ON ANTICOAGULATION.  CMP.  48 HOUR HOLTER TO FURTHER ASSESS A FIB CONTROL.  CONTINUE CURRENT MEDS.  F/U WITH PCP ON BACK PAIN ISSUES.  CARDIAC DIET  PHONE REVIEW FOR TEST RESULTS.    F/U 6 MONTHS.

## 2017-09-06 ENCOUNTER — TELEPHONE (OUTPATIENT)
Dept: CARDIOLOGY | Facility: HOSPITAL | Age: 82
End: 2017-09-06

## 2017-09-07 NOTE — TELEPHONE ENCOUNTER
Telephoned results to patient and received well.  Returned holter today advised to expect results in 24-48 hours

## 2017-09-12 ENCOUNTER — TELEPHONE (OUTPATIENT)
Dept: CARDIOLOGY | Facility: CLINIC | Age: 82
End: 2017-09-12

## 2017-09-13 NOTE — TELEPHONE ENCOUNTER
Please call pt  holter shows a fib to be controlled but does have a lot of premature beats called PVCs.  Needs EP consult.    Please schedule    Dr Stewart

## 2017-09-28 ENCOUNTER — CLINICAL SUPPORT (OUTPATIENT)
Dept: CARDIOLOGY | Facility: CLINIC | Age: 82
End: 2017-09-28
Payer: MEDICARE

## 2017-09-28 ENCOUNTER — INITIAL CONSULT (OUTPATIENT)
Dept: CARDIOLOGY | Facility: CLINIC | Age: 82
End: 2017-09-28
Payer: MEDICARE

## 2017-09-28 VITALS
BODY MASS INDEX: 21.98 KG/M2 | WEIGHT: 145 LBS | HEIGHT: 68 IN | DIASTOLIC BLOOD PRESSURE: 78 MMHG | HEART RATE: 74 BPM | SYSTOLIC BLOOD PRESSURE: 140 MMHG

## 2017-09-28 DIAGNOSIS — I48.91 ATRIAL FIBRILLATION, UNSPECIFIED TYPE: ICD-10-CM

## 2017-09-28 DIAGNOSIS — I10 ESSENTIAL HYPERTENSION: Primary | Chronic | ICD-10-CM

## 2017-09-28 DIAGNOSIS — I49.3 PVC'S (PREMATURE VENTRICULAR CONTRACTIONS): ICD-10-CM

## 2017-09-28 DIAGNOSIS — I77.9 BILATERAL CAROTID ARTERY DISEASE: Chronic | ICD-10-CM

## 2017-09-28 DIAGNOSIS — I48.91 ATRIAL FIBRILLATION, UNSPECIFIED TYPE: Primary | ICD-10-CM

## 2017-09-28 DIAGNOSIS — I48.20 ATRIAL FIBRILLATION, CHRONIC: Chronic | ICD-10-CM

## 2017-09-28 PROCEDURE — 3008F BODY MASS INDEX DOCD: CPT | Mod: S$GLB,,, | Performed by: INTERNAL MEDICINE

## 2017-09-28 PROCEDURE — 1159F MED LIST DOCD IN RCRD: CPT | Mod: S$GLB,,, | Performed by: INTERNAL MEDICINE

## 2017-09-28 PROCEDURE — 99999 PR PBB SHADOW E&M-EST. PATIENT-LVL II: CPT | Mod: PBBFAC,,, | Performed by: INTERNAL MEDICINE

## 2017-09-28 PROCEDURE — 3078F DIAST BP <80 MM HG: CPT | Mod: S$GLB,,, | Performed by: INTERNAL MEDICINE

## 2017-09-28 PROCEDURE — 3077F SYST BP >= 140 MM HG: CPT | Mod: S$GLB,,, | Performed by: INTERNAL MEDICINE

## 2017-09-28 PROCEDURE — 99499 UNLISTED E&M SERVICE: CPT | Mod: S$GLB,,, | Performed by: INTERNAL MEDICINE

## 2017-09-28 PROCEDURE — 93000 ELECTROCARDIOGRAM COMPLETE: CPT | Mod: S$GLB,,, | Performed by: INTERNAL MEDICINE

## 2017-09-28 PROCEDURE — 99205 OFFICE O/P NEW HI 60 MIN: CPT | Mod: S$GLB,,, | Performed by: INTERNAL MEDICINE
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
## 2017-09-28 NOTE — LETTER
September 28, 2017      Matthew Stewart MD  [address]  [place] [address]           [facility]  [address] [place] [address] , [address]  [place] [address]  Phone: [phone]  Fax: [phone]          Patient: Lexa Hammond   MR Number: [id number]   YOB: 1935   Date of Visit: 9/28/2017       Dear Dr. Matthew Stewart:    Thank you for referring Lexa Hammond to me for evaluation. Attached you will find relevant portions of my assessment and plan of care.    If you have questions, please do not hesitate to call me. I look forward to following Lexa Hammond along with you.    Sincerely,    Mamadou Contreras MD    Enclosure  CC:  No Recipients    If you would like to receive this communication electronically, please contact [email or web address] or [phone] to request more information on [employer] access.    For providers and/or their staff who would like to refer a patient to [facility], please contact us through our one-stop-shop provider referral line, [facility], at [phone].    If you feel you have received this communication in error or would no longer like to receive these types of communications, please e-mail [email or web address]

## 2017-10-16 DIAGNOSIS — I10 ESSENTIAL HYPERTENSION: ICD-10-CM

## 2017-10-16 RX ORDER — AMLODIPINE BESYLATE 5 MG/1
TABLET ORAL
Qty: 135 TABLET | Refills: 3 | Status: SHIPPED | OUTPATIENT
Start: 2017-10-16 | End: 2018-03-19 | Stop reason: ALTCHOICE

## 2017-10-17 ENCOUNTER — TELEPHONE (OUTPATIENT)
Dept: UROLOGY | Facility: CLINIC | Age: 82
End: 2017-10-17

## 2017-10-17 NOTE — TELEPHONE ENCOUNTER
Attempted to contact pt to inform him that his appt on 10/25/17 had to be rescheduled as MD would not be in.  Unable to leave [facility].  Appt rescheduled and mailed.

## 2017-10-25 ENCOUNTER — CLINICAL SUPPORT (OUTPATIENT)
Dept: CARDIOLOGY | Facility: CLINIC | Age: 82
End: 2017-10-25
Payer: MEDICARE

## 2017-10-25 DIAGNOSIS — I49.3 PVC'S (PREMATURE VENTRICULAR CONTRACTIONS): ICD-10-CM

## 2017-10-25 DIAGNOSIS — I48.20 ATRIAL FIBRILLATION, CHRONIC: Chronic | ICD-10-CM

## 2017-10-25 LAB
ESTIMATED PA SYSTOLIC PRESSURE: 46.94
MITRAL VALVE REGURGITATION: ABNORMAL
RETIRED EF AND QEF - SEE NOTES: 55 (ref 55–65)
TRICUSPID VALVE REGURGITATION: ABNORMAL

## 2017-10-25 PROCEDURE — 93306 TTE W/DOPPLER COMPLETE: CPT | Mod: S$GLB,,, | Performed by: INTERNAL MEDICINE

## 2017-11-01 ENCOUNTER — OFFICE VISIT (OUTPATIENT)
Dept: UROLOGY | Facility: CLINIC | Age: 82
End: 2017-11-01
Payer: MEDICARE

## 2017-11-01 VITALS — WEIGHT: 145 LBS | BODY MASS INDEX: 22.05 KG/M2 | SYSTOLIC BLOOD PRESSURE: 132 MMHG | DIASTOLIC BLOOD PRESSURE: 72 MMHG

## 2017-11-01 DIAGNOSIS — N32.81 OAB (OVERACTIVE BLADDER): ICD-10-CM

## 2017-11-01 DIAGNOSIS — R33.9 URINARY RETENTION: ICD-10-CM

## 2017-11-01 DIAGNOSIS — N40.0 BENIGN PROSTATIC HYPERPLASIA, UNSPECIFIED WHETHER LOWER URINARY TRACT SYMPTOMS PRESENT: Primary | ICD-10-CM

## 2017-11-01 LAB
BILIRUB SERPL-MCNC: NORMAL MG/DL
BLOOD URINE, POC: NORMAL
COLOR, POC UA: NORMAL
GLUCOSE UR QL STRIP: NORMAL
KETONES UR QL STRIP: NORMAL
LEUKOCYTE ESTERASE URINE, POC: NORMAL
NITRITE, POC UA: NORMAL
PH, POC UA: 5
PROTEIN, POC: NORMAL
SPECIFIC GRAVITY, POC UA: 1
UROBILINOGEN, POC UA: NORMAL

## 2017-11-01 PROCEDURE — 81002 URINALYSIS NONAUTO W/O SCOPE: CPT | Mod: S$GLB,,, | Performed by: UROLOGY

## 2017-11-01 PROCEDURE — 99999 PR PBB SHADOW E&M-EST. PATIENT-LVL II: CPT | Mod: PBBFAC,,, | Performed by: UROLOGY

## 2017-11-01 PROCEDURE — 99214 OFFICE O/P EST MOD 30 MIN: CPT | Mod: 25,S$GLB,, | Performed by: UROLOGY
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
## 2018-01-08 ENCOUNTER — TELEPHONE (OUTPATIENT)
Dept: CARDIOLOGY | Facility: CLINIC | Age: 83
End: 2018-01-08

## 2018-01-08 NOTE — TELEPHONE ENCOUNTER
Patient scheduled for dental treatment; Soft Tissue BX.  Patient presently taking Xarelto for A-Fib.    Requesting blood thinner recommendations.  Fax response to [phone].

## 2018-01-26 ENCOUNTER — OFFICE VISIT (OUTPATIENT)
Dept: INTERNAL MEDICINE | Facility: CLINIC | Age: 83
End: 2018-01-26
Payer: MEDICARE

## 2018-01-26 VITALS
WEIGHT: 149.5 LBS | DIASTOLIC BLOOD PRESSURE: 82 MMHG | SYSTOLIC BLOOD PRESSURE: 141 MMHG | HEART RATE: 77 BPM | OXYGEN SATURATION: 99 % | HEIGHT: 68 IN | BODY MASS INDEX: 22.66 KG/M2

## 2018-01-26 DIAGNOSIS — R41.3 MEMORY DEFICIT: ICD-10-CM

## 2018-01-26 DIAGNOSIS — I48.20 ATRIAL FIBRILLATION, CHRONIC: Chronic | ICD-10-CM

## 2018-01-26 DIAGNOSIS — G47.33 OSA (OBSTRUCTIVE SLEEP APNEA): Chronic | ICD-10-CM

## 2018-01-26 DIAGNOSIS — E78.2 MIXED HYPERLIPIDEMIA: Chronic | ICD-10-CM

## 2018-01-26 DIAGNOSIS — I70.1 RENAL ARTERY ATHEROSCLEROSIS, BILATERAL: ICD-10-CM

## 2018-01-26 DIAGNOSIS — I25.83 CORONARY ARTERY DISEASE DUE TO LIPID RICH PLAQUE: Chronic | ICD-10-CM

## 2018-01-26 DIAGNOSIS — M81.0 OSTEOPOROSIS, UNSPECIFIED OSTEOPOROSIS TYPE, UNSPECIFIED PATHOLOGICAL FRACTURE PRESENCE: ICD-10-CM

## 2018-01-26 DIAGNOSIS — I25.10 CORONARY ARTERY DISEASE DUE TO LIPID RICH PLAQUE: Chronic | ICD-10-CM

## 2018-01-26 DIAGNOSIS — N40.1 BENIGN PROSTATIC HYPERPLASIA WITH LOWER URINARY TRACT SYMPTOMS, SYMPTOM DETAILS UNSPECIFIED: ICD-10-CM

## 2018-01-26 DIAGNOSIS — I70.8 AORTO-ILIAC ATHEROSCLEROSIS: Chronic | ICD-10-CM

## 2018-01-26 DIAGNOSIS — I10 ESSENTIAL HYPERTENSION: Chronic | ICD-10-CM

## 2018-01-26 DIAGNOSIS — I70.0 AORTO-ILIAC ATHEROSCLEROSIS: Chronic | ICD-10-CM

## 2018-01-26 DIAGNOSIS — Z79.01 CHRONIC ANTICOAGULATION: Chronic | ICD-10-CM

## 2018-01-26 DIAGNOSIS — Z00.00 ENCOUNTER FOR PREVENTIVE HEALTH EXAMINATION: Primary | ICD-10-CM

## 2018-01-26 DIAGNOSIS — F32.89 OTHER DEPRESSION: ICD-10-CM

## 2018-01-26 DIAGNOSIS — R91.1 PULMONARY NODULE: ICD-10-CM

## 2018-01-26 DIAGNOSIS — J84.10 CALCIFIED GRANULOMA OF LUNG: ICD-10-CM

## 2018-01-26 DIAGNOSIS — M47.816 LUMBAR ARTHROPATHY: ICD-10-CM

## 2018-01-26 DIAGNOSIS — I25.9 CHRONIC ISCHEMIC HEART DISEASE: ICD-10-CM

## 2018-01-26 DIAGNOSIS — I27.9 PULMONARY HEART DISEASE: ICD-10-CM

## 2018-01-26 DIAGNOSIS — Z86.73 HISTORY OF CVA (CEREBROVASCULAR ACCIDENT): ICD-10-CM

## 2018-01-26 DIAGNOSIS — I77.9 BILATERAL CAROTID ARTERY DISEASE: Chronic | ICD-10-CM

## 2018-01-26 PROBLEM — R42 DIZZINESS: Status: RESOLVED | Noted: 2017-09-05 | Resolved: 2018-01-26

## 2018-01-26 PROCEDURE — 99499 UNLISTED E&M SERVICE: CPT | Mod: S$GLB,,, | Performed by: NURSE PRACTITIONER

## 2018-01-26 PROCEDURE — G0439 PPPS, SUBSEQ VISIT: HCPCS | Mod: S$GLB,,, | Performed by: NURSE PRACTITIONER

## 2018-01-26 PROCEDURE — 99999 PR PBB SHADOW E&M-EST. PATIENT-LVL IV: CPT | Mod: PBBFAC,,, | Performed by: NURSE PRACTITIONER

## 2018-01-26 NOTE — PROGRESS NOTES
"Lexa Hammond presented for a  Medicare AWV and comprehensive Health Risk Assessment today.  Patient accompanied by wife, who was present throughout the visit and aided in information gathering.      Medical history  · Family History  · Social history  · Allergies and Current Medications  · Health Risk Assessment  · Health Maintenance  · Care Team     ** See Completed Assessments for Annual Wellness Visit within the encounter summary.**       The following assessments were completed:  · Living Situation  · CAGE  · Depression Screening  · Timed Get Up and Go  · Whisper Test  · Cognitive Function Screening  · Nutrition Screening  · ADL Screening  · PAQ Screening    Vitals:    01/26/18 0814   BP: (!) 141/82   BP Location: Left arm   Patient Position: Sitting   BP Method: Large (Manual)   Pulse: 77   SpO2: 99%   Weight: 67.8 kg (149 lb 7.6 oz)   Height: 5' 8" (1.727 m)     Body mass index is 22.73 kg/m².  Physical Exam   Constitutional: He appears well-developed. No distress.   HENT:   Head: Normocephalic and atraumatic.   Eyes: Pupils are equal, round, and reactive to light.   Neck: Carotid bruit is not present.   Cardiovascular: Normal rate, intact distal pulses and normal pulses.  An irregular rhythm present. Exam reveals no gallop.    No murmur heard.  Pulmonary/Chest: Effort normal and breath sounds normal.   Abdominal: Soft. Bowel sounds are normal. He exhibits no distension. There is no tenderness.   Musculoskeletal: Normal range of motion. He exhibits no edema.   Neurological: He exhibits normal muscle tone. Gait normal.   Skin: Skin is warm, dry and intact.   Psychiatric: He has a normal mood and affect. His speech is normal and behavior is normal. Judgment normal. Cognition and memory are impaired. He exhibits abnormal recent memory.   Nursing note and vitals reviewed.        Diagnoses and health risks identified today and associated recommendations/orders:    1. Encounter for preventive health " examination      2. Atrial fibrillation, chronic  Component      Latest Ref Rng & Units 10/25/2017 6/29/2016 6/16/2014   EF      55 - 65 55 55 58   Mitral Valve Regurgitation       MODERATE (A) MILD    Est. PA Systolic Pressure       46.94 (A) 29.83    Tricuspid Valve Regurgitation       MODERATE (A) TRIVIAL TO MILD    S/P cardioversion 2013   Chronic and Stable on Xarelto. Denies chest pain or palpations  Continue current treatment plan as previously prescribed with your cardiologist-      3. Aorta-iliac atherosclerosis   CT chest 1/27/ 2016 showed  Arteriosclerotic disease in the aorta, great vessels and coronary arteries.  Chronic and Stable. Continue current treatment plan as previously prescribed with your cardiologist       4. Bilateral carotid artery disease   8/23/ 2017 There is 20 - 39% right Internal Carotid stenosis. There is 40 - 49% left Internal Carotid stenosis.  Chronic and Stable.  S/P R CEA by Dr. Angeles 12/09   Continue current treatment plan as previously prescribed with your cardiologist    5. Chronic anticoagulation  Stable and controlled on Xarelto- denies bleeding  Continue current treatment plan as previously prescribed with your cardiologist     6. Chronic ischemic heart disease  Chronic and Stable. Continue current treatment plan as previously prescribed with your cardiologist    7. Coronary artery disease due to lipid rich plaque   Heart Cath 7/2011- no stents.     Chronic and Stable blood pressure and cholesterol . Continue current treatment plan as previously prescribed with your cardiologist    8. Essential hypertension  Stable and controlled on Norvasc. Continue current treatment plan as previously prescribed with your PCP and cardiologist     9. Pulmonary nodule  Chronic and Stable CT chest 1/27/ 2016 3 mm noncalcified nodule in the right lower lobe.  Per Fleischer Society guidelines for nodule <mm; in a low risk patient, no follow-up recommended. 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 PA Systolic Pressure       46.94 (A)   Tricuspid Valve Regurgitation       MODERATE (A)   Chromic and stable. Followed by cardiologist    11. Mixed hyperlipidemia  Component      Latest Ref Rng & Units 5/4/2017   Cholesterol      120 - 199 mg/dL 153   Triglycerides      30 - 150 mg/dL 49   HDL      40 - 75 mg/dL 56   LDL Cholesterol      63.0 - 159.0 mg/dL 87.2   HDL/Chol Ratio      20.0 - 50.0 % 36.6   Total Cholesterol/HDL Ratio      2.0 - 5.0 2.7   Non-HDL Cholesterol      mg/dL 97   Chronic and Stable.  Pt intolerant of statin .Continue current treatment plan as previously prescribed with your PCP    12. Renal artery atherosclerosis, bilateral  CT chest 1/27/ 2016 Arteriosclerotic disease in the abdominal aorta and both main renal arteries as well as the splenic artery  Chronic and Stable. Continue current treatment plan as previously prescribed with your PCP    13. Memory deficit  Chronic and Stable. Pt score 2 of cogntion test. Wife states pt overall stable Follows neurologist, Dr. ODELL Varma.    14. Calcified granuloma of lung   CT 1/ 2016: chest Calcified granulomata in the posterior left lower lobe.  Chronic and Stable. Continue current treatment plan as previously prescribed with your PCP    15. Benign prostatic hyperplasia with lower urinary tract symptoms, symptom details unspecified  Prostate surgery (7/30/13); Prostate surgery 2004 and TURP  Chronic and Stable off of Flomax. States urine flow is doing well. Continue current treatment plan as previously prescribed with your urologist     16. Other depression  Chronic and Stable.Pt states he stopped Zoloft x  2 months ago. Denies feeling depressed  And does not want to be on medications-No longer wants to see psychiatrist = Dr. Pandey  Follow up with PCP to discuss    17. ZACK  (obstructive sleep apnea)  This problem is currently not controlled. Pt decline to wear CPAP due to intolerance  Please follow up with your PCP as planned to discuss adjustments to your treatment plan.    18. Osteoporosis, unspecified osteoporosis type, unspecified pathological fracture presence  Chronic and ongoing on Boniva. Last DEXA 7/2/ 2014.   Decline to schedule an up to date DEXA. Discuss with PCP    19. Lumbar arthropathy  Chronic and Stable. States occasional flare up-takes Tylenol . Followed by PCP    20.History of CVA (cerebrovascular accident)  Chronic and Stable. CVA 2002 - has balance issues but stable . Takes a daily Aspirin and Xarelto. Continue current treatment plan as previously prescribed with your PCP    Provided Lexa with a 5-10 year written screening schedule and personal prevention plan. Recommendations were developed using the USPSTF age appropriate recommendations. Education, counseling, and referrals were provided as needed. After Visit Summary printed and given to patient which includes a list of additional screenings\tests needed.    Follow-up in about 1 year (around 1/26/2019).    Stacey Hoang NP

## 2018-01-26 NOTE — PATIENT INSTRUCTIONS
Counseling and Referral of Other Preventative  (Italic type indicates deductible and co-insurance are waived)    Patient Name: Lexa Hammond  Today's Date: 1/26/2018    Health Maintenance       Date Due Completion Date    TETANUS VACCINE 07/30/1953 ---    Lipid Panel 05/04/2018 5/4/2017    Pneumococcal (65+) (2 of 2 - PCV13) 05/09/2018 5/9/2017        No orders of the defined types were placed in this encounter.    The following information is provided to all patients.  This information is to help you find resources for any of the problems found today that may be affecting your health:                Living healthy guide: www.[facility].[place].[facility]      Understanding Diabetes: www.diabetes.org      Eating healthy: www.cdc.gov/healthyweight      [facility] home safety checklist: www.cdc.gov/steadi/patient.html      Agency on Aging: www.goea.[place].[facility]      Alcoholics anonymous (AA): www.aa.org      Physical Activity: www.linda.nih.gov/ig1drjc      Tobacco use: www.quitwithusla.org

## 2018-01-26 NOTE — Clinical Note
Dr. Reyes,your patient was seen today for a HRA visit Please review non urgent  # 17 and 18  I have included a copy of my visit note, feel free to contact me with any questions.  Thank you for allowing me to participate in the care of your patients.  Stacey Hoang NP

## 2018-03-19 ENCOUNTER — OFFICE VISIT (OUTPATIENT)
Dept: CARDIOLOGY | Facility: CLINIC | Age: 83
End: 2018-03-19
Payer: MEDICARE

## 2018-03-19 ENCOUNTER — OFFICE VISIT (OUTPATIENT)
Dept: OPHTHALMOLOGY | Facility: CLINIC | Age: 83
End: 2018-03-19
Payer: MEDICARE

## 2018-03-19 VITALS
WEIGHT: 149.06 LBS | HEIGHT: 68 IN | SYSTOLIC BLOOD PRESSURE: 154 MMHG | DIASTOLIC BLOOD PRESSURE: 90 MMHG | BODY MASS INDEX: 22.59 KG/M2

## 2018-03-19 DIAGNOSIS — I25.83 CORONARY ARTERY DISEASE DUE TO LIPID RICH PLAQUE: Chronic | ICD-10-CM

## 2018-03-19 DIAGNOSIS — I49.3 PVC'S (PREMATURE VENTRICULAR CONTRACTIONS): ICD-10-CM

## 2018-03-19 DIAGNOSIS — I25.10 CORONARY ARTERY DISEASE DUE TO LIPID RICH PLAQUE: Chronic | ICD-10-CM

## 2018-03-19 DIAGNOSIS — H52.7 REFRACTIVE ERROR: ICD-10-CM

## 2018-03-19 DIAGNOSIS — Z79.01 CHRONIC ANTICOAGULATION: Chronic | ICD-10-CM

## 2018-03-19 DIAGNOSIS — E70.319 OCULAR ALBINISM: Primary | ICD-10-CM

## 2018-03-19 DIAGNOSIS — I77.9 BILATERAL CAROTID ARTERY DISEASE: Chronic | ICD-10-CM

## 2018-03-19 DIAGNOSIS — I36.1 NON-RHEUMATIC TRICUSPID VALVE INSUFFICIENCY: ICD-10-CM

## 2018-03-19 DIAGNOSIS — Z96.1 PSEUDOPHAKIA OF BOTH EYES: ICD-10-CM

## 2018-03-19 DIAGNOSIS — I25.9 CHRONIC ISCHEMIC HEART DISEASE: ICD-10-CM

## 2018-03-19 DIAGNOSIS — Z86.73 HISTORY OF CVA (CEREBROVASCULAR ACCIDENT): ICD-10-CM

## 2018-03-19 DIAGNOSIS — I48.20 ATRIAL FIBRILLATION, CHRONIC: Chronic | ICD-10-CM

## 2018-03-19 DIAGNOSIS — I10 ESSENTIAL HYPERTENSION: Primary | Chronic | ICD-10-CM

## 2018-03-19 DIAGNOSIS — H55.01 NYSTAGMUS, CONGENITAL: ICD-10-CM

## 2018-03-19 PROCEDURE — 99499 UNLISTED E&M SERVICE: CPT | Mod: S$GLB,,, | Performed by: INTERNAL MEDICINE

## 2018-03-19 PROCEDURE — 92014 COMPRE OPH EXAM EST PT 1/>: CPT | Mod: S$GLB,,, | Performed by: OPTOMETRIST

## 2018-03-19 PROCEDURE — 99214 OFFICE O/P EST MOD 30 MIN: CPT | Mod: S$GLB,,, | Performed by: INTERNAL MEDICINE

## 2018-03-19 PROCEDURE — 99999 PR PBB SHADOW E&M-EST. PATIENT-LVL III: CPT | Mod: PBBFAC,,, | Performed by: INTERNAL MEDICINE

## 2018-03-19 PROCEDURE — 99999 PR PBB SHADOW E&M-EST. PATIENT-LVL I: CPT | Mod: PBBFAC,,, | Performed by: OPTOMETRIST

## 2018-03-19 PROCEDURE — 3080F DIAST BP >= 90 MM HG: CPT | Mod: CPTII,S$GLB,, | Performed by: INTERNAL MEDICINE

## 2018-03-19 PROCEDURE — 92015 DETERMINE REFRACTIVE STATE: CPT | Mod: S$GLB,,, | Performed by: OPTOMETRIST

## 2018-03-19 PROCEDURE — 3077F SYST BP >= 140 MM HG: CPT | Mod: CPTII,S$GLB,, | Performed by: INTERNAL MEDICINE

## 2018-03-19 PROCEDURE — 99499 UNLISTED E&M SERVICE: CPT | Mod: S$GLB,,, | Performed by: OPTOMETRIST

## 2018-03-19 RX ORDER — RAMIPRIL 5 MG/1
5 CAPSULE ORAL DAILY
Qty: 90 CAPSULE | Refills: 3 | Status: SHIPPED | OUTPATIENT
Start: 2018-03-19 | End: 2018-05-22

## 2018-03-19 NOTE — PROGRESS NOTES
"Subjective:    Patient ID:  Lexa Hammond is a 82 y.o. male who presents for evaluation of Atrial Fibrillation; Hyperlipidemia; Hypertension; Coronary Artery Disease; and Carotid Artery Disease      HPI Mr. Hammond returns for f/u.   His current medical conditions include HTN, chronic atrial fibrillation, PAD,carotid artery disease. Nonsmoker.  Past hx pertinent for following:  S/p CVA 2002. He had a R CEA by Dr. Angeles 12/09.   S/p  LHC 7/11 showed calcified CAD. Medical mgt recommended.   He did not tolerate Ranexa (rash). He has been intolerant of many meds to include many statins. Did not tolerate Imdur with headaches. He also stopped Metoprolol and Pravastatin for dizziness.   Negative stress MPI June 2016.  Pt here for f/u.  Has letter from dentist about gingival hypertrophy and to see if he could get off amlodipine.  No active anginal sxs on current medical tx.  No chf sxs.  No tia/cva sxs.  Compliant with Xarelto, no abnl bleeding.  Goes to health club 3 days/week for exercise.  BP controlled, although elevated today.     Review of Systems   Constitution: Positive for malaise/fatigue.   HENT: Negative.    Eyes: Negative.    Cardiovascular: Negative.    Respiratory: Negative.    Endocrine: Negative.    Hematologic/Lymphatic: Negative.    Skin: Negative.    Musculoskeletal: Positive for arthritis.   Gastrointestinal: Negative.    Genitourinary: Negative.    Neurological: Negative.    Psychiatric/Behavioral: Negative.    Allergic/Immunologic: Negative.        BP (!) 154/90 (BP Method: Small (Manual))   Ht 5' 8" (1.727 m)   Wt 67.6 kg (149 lb 0.5 oz)   BMI 22.66 kg/m²     Wt Readings from Last 3 Encounters:   03/19/18 67.6 kg (149 lb 0.5 oz)   01/26/18 67.8 kg (149 lb 7.6 oz)   11/01/17 65.8 kg (145 lb)     Temp Readings from Last 3 Encounters:   07/28/17 98.7 °F (37.1 °C)   05/09/17 97.2 °F (36.2 °C) (Tympanic)   10/07/16 (!) 95.8 °F (35.4 °C) (Tympanic)     BP Readings from Last 3 Encounters: "   03/19/18 (!) 154/90   01/26/18 (!) 141/82   11/01/17 132/72     Pulse Readings from Last 3 Encounters:   01/26/18 77   09/28/17 74   09/05/17 75          Objective:    Physical Exam   Constitutional: He is oriented to person, place, and time. He appears well-developed and well-nourished.   HENT:   Head: Normocephalic.   Neck: Normal range of motion. Neck supple. Normal carotid pulses, no hepatojugular reflux and no JVD present. Carotid bruit is not present. No thyromegaly present.   Cardiovascular: Normal rate, S1 normal and S2 normal.  An irregularly irregular rhythm present. PMI is not displaced.  Exam reveals no S3, no S4, no distant heart sounds, no friction rub, no midsystolic click and no opening snap.    No murmur heard.  Pulses:       Radial pulses are 2+ on the right side, and 2+ on the left side.   Pulmonary/Chest: Effort normal and breath sounds normal. He has no wheezes. He has no rales.   Abdominal: Soft. Bowel sounds are normal. He exhibits no distension, no abdominal bruit, no ascites and no mass. There is no tenderness.   Musculoskeletal: He exhibits no edema.   Neurological: He is alert and oriented to person, place, and time.   Skin: Skin is warm.   Psychiatric: He has a normal mood and affect. His behavior is normal.   Nursing note and vitals reviewed.      I have reviewed all pertinent labs and cardiac studies.      Chemistry        Component Value Date/Time     09/05/2017 1044    K 4.8 09/05/2017 1044     09/05/2017 1044    CO2 31 (H) 09/05/2017 1044    BUN 13 09/05/2017 1044    CREATININE 0.8 09/05/2017 1044    GLU 82 09/05/2017 1044        Component Value Date/Time    CALCIUM 9.7 09/05/2017 1044    ALKPHOS 67 09/05/2017 1044    AST 23 09/05/2017 1044    ALT 13 09/05/2017 1044    BILITOT 1.1 (H) 09/05/2017 1044    ESTGFRAFRICA >60.0 09/05/2017 1044    EGFRNONAA >60.0 09/05/2017 1044        Lab Results   Component Value Date    WBC 7.31 09/05/2017    HGB 15.8 09/05/2017    HCT 47.3  09/05/2017    MCV 95 09/05/2017     09/05/2017     Lab Results   Component Value Date    CHOL 153 05/04/2017    CHOL 170 06/29/2016    CHOL 142 05/26/2015     Lab Results   Component Value Date    HDL 56 05/04/2017    HDL 56 06/29/2016    HDL 54 05/26/2015     Lab Results   Component Value Date    LDLCALC 87.2 05/04/2017    LDLCALC 98.2 06/29/2016    LDLCALC 79.0 05/26/2015     Lab Results   Component Value Date    TRIG 49 05/04/2017    TRIG 79 06/29/2016    TRIG 45 05/26/2015     Lab Results   Component Value Date    CHOLHDL 36.6 05/04/2017    CHOLHDL 32.9 06/29/2016    CHOLHDL 38.0 05/26/2015           Assessment:       1. Essential hypertension    2. PVC's (premature ventricular contractions)    3. Non-rheumatic tricuspid valve insufficiency    4. History of CVA (cerebrovascular accident)    5. Coronary artery disease due to lipid rich plaque    6. Chronic ischemic heart disease    7. Chronic anticoagulation    8. Atrial fibrillation, chronic    9. Bilateral carotid artery disease         Plan:             Stable CAD/CV conditions on current medical tx.  Will stop Amlodipine 5 mg due to gingival hyperplasia issues addressed by dentist.  Start Altace 5 mg daily for HTN.  CMP, CBC, FLP one month.  Continue Xarelto  Cardiac diet  Exercise    F/u 6 weeks.

## 2018-03-19 NOTE — PROGRESS NOTES
HPI     Patient sees a floater in both eyes sometimes. No flashes of light.   Hadn't seen a floater in a while. Last eye exam 03/06/2017 TRF. Update   glasses RX.     Last edited by Jenifer Hoang on 3/19/2018  9:06 AM. (History)            Assessment /Plan     For exam results, see Encounter Report.    Ocular albinism    Nystagmus, congenital    Pseudophakia of both eyes    Refractive error      Stable ocular health    Dispense Final Rx for glasses.  RTC 1 year  Discussed above and answered questions.

## 2018-04-09 ENCOUNTER — LAB VISIT (OUTPATIENT)
Dept: LAB | Facility: HOSPITAL | Age: 83
End: 2018-04-09
Attending: INTERNAL MEDICINE
Payer: MEDICARE

## 2018-04-09 DIAGNOSIS — I10 ESSENTIAL HYPERTENSION: Chronic | ICD-10-CM

## 2018-04-09 DIAGNOSIS — I25.9 CHRONIC ISCHEMIC HEART DISEASE: ICD-10-CM

## 2018-04-09 LAB
ALBUMIN SERPL BCP-MCNC: 4.4 G/DL
ALP SERPL-CCNC: 73 U/L
ALT SERPL W/O P-5'-P-CCNC: 14 U/L
ANION GAP SERPL CALC-SCNC: 9 MMOL/L
AST SERPL-CCNC: 23 U/L
BASOPHILS # BLD AUTO: 0.06 K/UL
BASOPHILS NFR BLD: 0.7 %
BILIRUB SERPL-MCNC: 1.1 MG/DL
BUN SERPL-MCNC: 17 MG/DL
CALCIUM SERPL-MCNC: 10 MG/DL
CHLORIDE SERPL-SCNC: 100 MMOL/L
CHOLEST SERPL-MCNC: 188 MG/DL
CHOLEST/HDLC SERPL: 2.6 {RATIO}
CO2 SERPL-SCNC: 32 MMOL/L
CREAT SERPL-MCNC: 1 MG/DL
DIFFERENTIAL METHOD: ABNORMAL
EOSINOPHIL # BLD AUTO: 0.2 K/UL
EOSINOPHIL NFR BLD: 2.6 %
ERYTHROCYTE [DISTWIDTH] IN BLOOD BY AUTOMATED COUNT: 13.1 %
EST. GFR  (AFRICAN AMERICAN): >60 ML/MIN/1.73 M^2
EST. GFR  (NON AFRICAN AMERICAN): >60 ML/MIN/1.73 M^2
GLUCOSE SERPL-MCNC: 99 MG/DL
HCT VFR BLD AUTO: 49.8 %
HDLC SERPL-MCNC: 72 MG/DL
HDLC SERPL: 38.3 %
HGB BLD-MCNC: 16.4 G/DL
IMM GRANULOCYTES # BLD AUTO: 0.01 K/UL
IMM GRANULOCYTES NFR BLD AUTO: 0.1 %
LDLC SERPL CALC-MCNC: 104.6 MG/DL
LYMPHOCYTES # BLD AUTO: 2.1 K/UL
LYMPHOCYTES NFR BLD: 26.6 %
MCH RBC QN AUTO: 31.4 PG
MCHC RBC AUTO-ENTMCNC: 32.9 G/DL
MCV RBC AUTO: 95 FL
MONOCYTES # BLD AUTO: 0.8 K/UL
MONOCYTES NFR BLD: 9.6 %
NEUTROPHILS # BLD AUTO: 4.8 K/UL
NEUTROPHILS NFR BLD: 60.4 %
NONHDLC SERPL-MCNC: 116 MG/DL
NRBC BLD-RTO: 0 /100 WBC
PLATELET # BLD AUTO: 176 K/UL
PMV BLD AUTO: 9.8 FL
POTASSIUM SERPL-SCNC: 4.1 MMOL/L
PROT SERPL-MCNC: 8 G/DL
RBC # BLD AUTO: 5.23 M/UL
SODIUM SERPL-SCNC: 141 MMOL/L
TRIGL SERPL-MCNC: 57 MG/DL
WBC # BLD AUTO: 8.01 K/UL

## 2018-04-09 PROCEDURE — 80053 COMPREHEN METABOLIC PANEL: CPT

## 2018-04-09 PROCEDURE — 85025 COMPLETE CBC W/AUTO DIFF WBC: CPT

## 2018-04-09 PROCEDURE — 80061 LIPID PANEL: CPT

## 2018-04-09 PROCEDURE — 36415 COLL VENOUS BLD VENIPUNCTURE: CPT | Mod: PO

## 2018-04-24 ENCOUNTER — TELEPHONE (OUTPATIENT)
Dept: UROLOGY | Facility: CLINIC | Age: 83
End: 2018-04-24

## 2018-04-24 ENCOUNTER — HOSPITAL ENCOUNTER (EMERGENCY)
Facility: HOSPITAL | Age: 83
Discharge: HOME | End: 2018-04-24
Attending: SPECIALIST
Payer: MEDICARE

## 2018-04-24 ENCOUNTER — NURSE TRIAGE (OUTPATIENT)
Dept: ADMINISTRATIVE | Facility: CLINIC | Age: 83
End: 2018-04-24

## 2018-04-24 VITALS
TEMPERATURE: 98 F | DIASTOLIC BLOOD PRESSURE: 95 MMHG | HEIGHT: 69 IN | SYSTOLIC BLOOD PRESSURE: 176 MMHG | HEART RATE: 74 BPM | RESPIRATION RATE: 18 BRPM | WEIGHT: 149 LBS | OXYGEN SATURATION: 95 % | BODY MASS INDEX: 22.07 KG/M2

## 2018-04-24 DIAGNOSIS — N30.01 ACUTE CYSTITIS WITH HEMATURIA: Primary | ICD-10-CM

## 2018-04-24 LAB
ALBUMIN SERPL BCP-MCNC: 3.7 G/DL
ALP SERPL-CCNC: 64 U/L
ALT SERPL W/O P-5'-P-CCNC: 14 U/L
ANION GAP SERPL CALC-SCNC: 8 MMOL/L
APTT BLDCRRT: 22.5 SEC
AST SERPL-CCNC: 25 U/L
BACTERIA #/AREA URNS HPF: ABNORMAL /HPF
BASOPHILS # BLD AUTO: 0.04 K/UL
BASOPHILS NFR BLD: 0.4 %
BILIRUB SERPL-MCNC: 1 MG/DL
BILIRUB UR QL STRIP: NEGATIVE
BUN SERPL-MCNC: 15 MG/DL
CALCIUM SERPL-MCNC: 8.9 MG/DL
CHLORIDE SERPL-SCNC: 105 MMOL/L
CLARITY UR: ABNORMAL
CO2 SERPL-SCNC: 26 MMOL/L
COLOR UR: YELLOW
CREAT SERPL-MCNC: 0.8 MG/DL
DIFFERENTIAL METHOD: ABNORMAL
EOSINOPHIL # BLD AUTO: 0.1 K/UL
EOSINOPHIL NFR BLD: 1.3 %
ERYTHROCYTE [DISTWIDTH] IN BLOOD BY AUTOMATED COUNT: 13.8 %
EST. GFR  (AFRICAN AMERICAN): >60 ML/MIN/1.73 M^2
EST. GFR  (NON AFRICAN AMERICAN): >60 ML/MIN/1.73 M^2
GLUCOSE SERPL-MCNC: 115 MG/DL
GLUCOSE UR QL STRIP: NEGATIVE
HCT VFR BLD AUTO: 44.4 %
HGB BLD-MCNC: 15 G/DL
HGB UR QL STRIP: ABNORMAL
HYALINE CASTS #/AREA URNS LPF: 0 /LPF
INR PPP: 1.1
KETONES UR QL STRIP: NEGATIVE
LEUKOCYTE ESTERASE UR QL STRIP: ABNORMAL
LYMPHOCYTES # BLD AUTO: 1.4 K/UL
LYMPHOCYTES NFR BLD: 14.5 %
MCH RBC QN AUTO: 32 PG
MCHC RBC AUTO-ENTMCNC: 33.8 G/DL
MCV RBC AUTO: 95 FL
MICROSCOPIC COMMENT: ABNORMAL
MONOCYTES # BLD AUTO: 0.8 K/UL
MONOCYTES NFR BLD: 8.5 %
NEUTROPHILS # BLD AUTO: 7.2 K/UL
NEUTROPHILS NFR BLD: 75.3 %
NITRITE UR QL STRIP: NEGATIVE
PH UR STRIP: 7 [PH] (ref 5–8)
PLATELET # BLD AUTO: 168 K/UL
PMV BLD AUTO: 9.3 FL
POTASSIUM SERPL-SCNC: 4 MMOL/L
PROT SERPL-MCNC: 6.8 G/DL
PROT UR QL STRIP: ABNORMAL
PROTHROMBIN TIME: 10.9 SEC
RBC # BLD AUTO: 4.69 M/UL
RBC #/AREA URNS HPF: >100 /HPF (ref 0–4)
SODIUM SERPL-SCNC: 139 MMOL/L
SP GR UR STRIP: 1.02 (ref 1–1.03)
URN SPEC COLLECT METH UR: ABNORMAL
UROBILINOGEN UR STRIP-ACNC: 1 EU/DL
WBC # BLD AUTO: 9.55 K/UL
WBC #/AREA URNS HPF: >100 /HPF (ref 0–5)

## 2018-04-24 PROCEDURE — 85610 PROTHROMBIN TIME: CPT

## 2018-04-24 PROCEDURE — 96374 THER/PROPH/DIAG INJ IV PUSH: CPT

## 2018-04-24 PROCEDURE — 87086 URINE CULTURE/COLONY COUNT: CPT

## 2018-04-24 PROCEDURE — 99284 EMERGENCY DEPT VISIT MOD MDM: CPT | Mod: 25

## 2018-04-24 PROCEDURE — 87186 SC STD MICRODIL/AGAR DIL: CPT

## 2018-04-24 PROCEDURE — 85025 COMPLETE CBC W/AUTO DIFF WBC: CPT

## 2018-04-24 PROCEDURE — 85730 THROMBOPLASTIN TIME PARTIAL: CPT

## 2018-04-24 PROCEDURE — 81000 URINALYSIS NONAUTO W/SCOPE: CPT

## 2018-04-24 PROCEDURE — 87077 CULTURE AEROBIC IDENTIFY: CPT

## 2018-04-24 PROCEDURE — 87088 URINE BACTERIA CULTURE: CPT

## 2018-04-24 PROCEDURE — 25000003 PHARM REV CODE 250: Performed by: SPECIALIST

## 2018-04-24 PROCEDURE — 63600175 PHARM REV CODE 636 W HCPCS: Performed by: SPECIALIST

## 2018-04-24 PROCEDURE — 80053 COMPREHEN METABOLIC PANEL: CPT

## 2018-04-24 RX ORDER — CIPROFLOXACIN 500 MG/1
500 TABLET ORAL 2 TIMES DAILY
Qty: 20 TABLET | Refills: 0 | Status: SHIPPED | OUTPATIENT
Start: 2018-04-24 | End: 2018-05-04

## 2018-04-24 RX ORDER — CEFTRIAXONE 1 G/1
1 INJECTION, POWDER, FOR SOLUTION INTRAMUSCULAR; INTRAVENOUS
Status: COMPLETED | OUTPATIENT
Start: 2018-04-24 | End: 2018-04-24

## 2018-04-24 RX ORDER — CLONIDINE HYDROCHLORIDE 0.1 MG/1
0.1 TABLET ORAL
Status: COMPLETED | OUTPATIENT
Start: 2018-04-24 | End: 2018-04-24

## 2018-04-24 RX ADMIN — CLONIDINE HYDROCHLORIDE 0.1 MG: 0.1 TABLET ORAL at 11:04

## 2018-04-24 RX ADMIN — CEFTRIAXONE SODIUM 1 G: 1 INJECTION, POWDER, FOR SOLUTION INTRAMUSCULAR; INTRAVENOUS at 11:04

## 2018-04-24 NOTE — ED PROVIDER NOTES
"SCRIBE #1 NOTE: I, Lencho Caldwell, am scribing for, and in the presence of, Janessa Joseph MD. I have scribed the entire note.      History      Chief Complaint   Patient presents with    Hematuria     pt c/o blood in his urine today and burning with urination; pt self-caths once daily; told to present to ED by [facility] nurse line because pt takes Xarleto       Review of patient's allergies indicates:   Allergen Reactions    Coumadin [warfarin] Other (See Comments)     Problems with anticoagulation, on x 6 years, Doing better w/Xaralto        HPI   HPI    4/24/2018, 10:41 AM   History obtained from the patient      History of Present Illness: Lexa Hammond is a 82 y.o. male patient who presents to the Emergency Department for hematuria which onset gradually this AM. Pt describes his sxs as "blood mixed with urine." Symptoms are episodic and moderate in severity.  No mitigating or exacerbating factors reported. Pt self-caths daily. Pt also complains of a "burning sensation" to the lower right sided abd. Pt is currently on xarelto. Pt recently started ramipril 5 mg. Patient denies any fever, chills, constipation, hematochezia, changes in urinary frequency/ urgency, n/v/d, and all other sxs at this time. No further complaints or concerns at this time.         Arrival mode: Personal vehicle    PCP: Sam Reyes MD       Past Medical History:  Past Medical History:   Diagnosis Date    Abnormal ECG 6/24/2013    Angina pectoris     Anticoagulant long-term use     Atrial fibrillation, chronic 6/24/2013     Cardioversion successful for 1 month only    Benign hematuria     ingerman 03    BPH (benign prostatic hyperplasia)     munoz    CAD (coronary artery disease)     loretta    Carotid artery disease 6/24/2013    aaliyah    Chronic ischemic heart disease 1/13/2016    Colon polyp 4/26/2015    Tubular adenoma 10/3/2007     Depression 10/7/2016    Diaphragmatic hernia without obstruction and without " gangrene 4/26/2015    CT-7/8/13: Small hernia is noted posteromedially the right hemidiaphragm with some intrathoracic protrusion of abdominal fat.     Diverticulosis 4/26/2015 1/22/13 colo.     Hemiparesis and alteration of sensations as late effects of stroke     CVA 2002     HTN (hypertension)     Hyperlipidemia     Nephrolithiasis 3/9/10    US left renal nonobstructing stone, Hxstones  every 20 years. passed.    Obstructive sleep apnea     dr berry    Osteoporosis, unspecified     Peripheral vascular disease 1/13/2016    Renal cyst, right 7/18/13    CT documented, known before [facility]    Stroke     right sided weakness    Trouble in sleeping     Urinary incontinence     occasional, and with stress/sneeze    Vertebral fracture 7/1/13    CT -indeterminate age L1 superior endplate comp fx.       Past Surgical History:  Past Surgical History:   Procedure Laterality Date    CATARACT EXTRACTION Bilateral     CEA Right 12/2009    Dr BELL Angeles    CHOLECYSTECTOMY  3/23/10    cystolithalopaxy  7/30/13    at time of second turp Also ureteral stricture treated.    EYE SURGERY Bilateral 1995 approx    cataracts    INGUINAL HERNIA REPAIR Left 3/23/10    with choly    INGUINAL HERNIA REPAIR Right 3/9/12    PROSTATE SURGERY  7/30/13    laserTURP for BPH    PROSTATE SURGERY  2004 app[facility]    1st TURP Dr [facility], date unsure    TRANSURETHRAL RESECTION OF PROSTATE      VASECTOMY  1965 approx    Dr Conley         Family History:  Family History   Problem Relation Age of Onset    Diabetes Mother     Hypertension Mother     Hypertension Sister     Hypertension Brother     Hypertension Sister     Stroke Father     Cancer Father     Bipolar disorder Sister     Schizophrenia Sister     Alcohol abuse Neg Hx     Asthma Neg Hx     Birth defects Neg Hx     COPD Neg Hx     Depression Neg Hx     Heart disease Neg Hx     Mental illness Neg Hx     Mental retardation Neg Hx      "Hearing loss Neg Hx     Kidney disease Neg Hx        Social History:  Social History     Social History Main Topics    Smoking status: Never Smoker    Smokeless tobacco: Never Used    Alcohol use No      Comment: very rarely    Drug use: No    Sexual activity: No       ROS   Review of Systems   Constitutional: Negative for chills and fever.   Respiratory: Negative for shortness of breath.    Cardiovascular: Negative for chest pain.   Gastrointestinal: Negative for abdominal pain, diarrhea, nausea and vomiting.        + "burning sensation" to the lower right sided abd   Genitourinary: Positive for hematuria. Negative for difficulty urinating, dysuria, frequency and urgency.   Neurological: Negative for dizziness, syncope, weakness, light-headedness, numbness and headaches.   All other systems reviewed and are negative.      Physical Exam      Initial Vitals [04/24/18 0954]   BP Pulse Resp Temp SpO2   (!) 193/103 93 18 97.5 °F (36.4 °C) 96 %      MAP       133          Physical Exam  Nursing Notes and Vital Signs Reviewed.  Constitutional: Patient is in no apparent distress. Well-developed and well-nourished.  Head: Atraumatic. Normocephalic.  Eyes: PERRL. EOM intact. Conjunctivae are not pale. No scleral icterus.  ENT: Mucous membranes are moist. Oropharynx is clear and symmetric.    Neck: Supple. Full ROM. No lymphadenopathy.  Cardiovascular: Regular rate. Regular rhythm. No murmurs, rubs, or gallops. Distal pulses are 2+ and symmetric.  Pulmonary/Chest: No respiratory distress. Clear to auscultation bilaterally. No wheezing or rales.  Abdominal: Soft and non-distended.  There is no tenderness.  No rebound, guarding, or rigidity. Good bowel sounds.  Genitourinary: No CVA tenderness  Musculoskeletal: Moves all extremities. No obvious deformities. No edema. No calf tenderness.  Skin: Warm and dry.  Neurological:  Alert, awake, and appropriate.  Normal speech.  No acute focal neurological deficits are " "appreciated.  Psychiatric: Normal affect. Good eye contact. Appropriate in content.    ED Course    Procedures  ED Vital Signs:  Vitals:    04/24/18 0954 04/24/18 1100 04/24/18 1130 04/24/18 1200   BP: (!) 193/103 (!) 200/102 (!) 216/99 (!) 182/97   Pulse: 93 76 81 81   Resp: 18 18 18 18   Temp: 97.5 °F (36.4 °C)      TempSrc: Oral      SpO2: 96% 98% 96% 97%   Weight: 67.6 kg (149 lb)      Height: 5' 8.5" (1.74 m)       04/24/18 1230 04/24/18 1231   BP: (!) 189/95 (!) 176/95   Pulse: 81 74   Resp: 18 18   Temp:     TempSrc:     SpO2: 97% 95%   Weight:     Height:         Abnormal Lab Results:  Labs Reviewed   URINALYSIS - Abnormal; Notable for the following:        Result Value    Appearance, UA Cloudy (*)     Protein, UA 1+ (*)     Occult Blood UA 3+ (*)     Leukocytes, UA 2+ (*)     All other components within normal limits   CBC W/ AUTO DIFFERENTIAL - Abnormal; Notable for the following:     MCH 32.0 (*)     Gran% 75.3 (*)     Lymph% 14.5 (*)     All other components within normal limits   COMPREHENSIVE METABOLIC PANEL - Abnormal; Notable for the following:     Glucose 115 (*)     All other components within normal limits   URINALYSIS MICROSCOPIC - Abnormal; Notable for the following:     RBC, UA >100 (*)     WBC, UA >100 (*)     Bacteria, UA Few (*)     All other components within normal limits   CULTURE, URINE   CULTURE, URINE   PROTIME-INR   APTT        All Lab Results:  Results for orders placed or performed during the hospital encounter of 04/24/18   Urinalysis   Result Value Ref Range    Specimen UA Urine, Clean Catch     Color, UA Yellow Yellow, Straw, Marlen    Appearance, UA Cloudy (A) Clear    pH, UA 7.0 5.0 - 8.0    Specific Gravity, UA 1.020 1.005 - 1.030    Protein, UA 1+ (A) Negative    Glucose, UA Negative Negative    Ketones, UA Negative Negative    Bilirubin (UA) Negative Negative    Occult Blood UA 3+ (A) Negative    Nitrite, UA Negative Negative    Urobilinogen, UA 1.0 <2.0 EU/dL    Leukocytes, " UA 2+ (A) Negative   CBC auto differential   Result Value Ref Range    WBC 9.55 3.90 - 12.70 K/uL    RBC 4.69 4.60 - 6.20 M/uL    Hemoglobin 15.0 14.0 - 18.0 g/dL    Hematocrit 44.4 40.0 - 54.0 %    MCV 95 82 - 98 fL    MCH 32.0 (H) 27.0 - 31.0 pg    MCHC 33.8 32.0 - 36.0 g/dL    RDW 13.8 11.5 - 14.5 %    Platelets 168 150 - 350 K/uL    MPV 9.3 9.2 - 12.9 fL    Gran # (ANC) 7.2 1.8 - 7.7 K/uL    Lymph # 1.4 1.0 - 4.8 K/uL    Mono # 0.8 0.3 - 1.0 K/uL    Eos # 0.1 0.0 - 0.5 K/uL    Baso # 0.04 0.00 - 0.20 K/uL    Gran% 75.3 (H) 38.0 - 73.0 %    Lymph% 14.5 (L) 18.0 - 48.0 %    Mono% 8.5 4.0 - 15.0 %    Eosinophil% 1.3 0.0 - 8.0 %    Basophil% 0.4 0.0 - 1.9 %    Differential Method Automated    Comprehensive metabolic panel   Result Value Ref Range    Sodium 139 136 - 145 mmol/L    Potassium 4.0 3.5 - 5.1 mmol/L    Chloride 105 95 - 110 mmol/L    CO2 26 23 - 29 mmol/L    Glucose 115 (H) 70 - 110 mg/dL    BUN, Bld 15 8 - 23 mg/dL    Creatinine 0.8 0.5 - 1.4 mg/dL    Calcium 8.9 8.7 - 10.5 mg/dL    Total Protein 6.8 6.0 - 8.4 g/dL    Albumin 3.7 3.5 - 5.2 g/dL    Total Bilirubin 1.0 0.1 - 1.0 mg/dL    Alkaline Phosphatase 64 55 - 135 U/L    AST 25 10 - 40 U/L    ALT 14 10 - 44 U/L    Anion Gap 8 8 - 16 mmol/L    eGFR if African American >60 >60 mL/min/1.73 m^2    eGFR if non African American >60 >60 mL/min/1.73 m^2   Protime-INR   Result Value Ref Range    Prothrombin Time 10.9 9.0 - 12.5 sec    INR 1.1 0.8 - 1.2   APTT   Result Value Ref Range    aPTT 22.5 21.0 - 32.0 sec   Urinalysis Microscopic   Result Value Ref Range    RBC, UA >100 (H) 0 - 4 /hpf    WBC, UA >100 (H) 0 - 5 /hpf    Bacteria, UA Few (A) None-Occ /hpf    Hyaline Casts, UA 0 0-1/lpf /lpf    Microscopic Comment SEE COMMENT          Imaging Results:  Imaging Results          CT Renal Stone Study ABD Pelvis WO (Final result)  Result time 04/24/18 11:25:17    Final result by Joseph Triana MD (04/24/18 11:25:17)                 Impression:              Cystitis.  No obstructive uropathy.        All CT scans at this facility use dose modulation, iterative reconstruction and/or weight based dosing when appropriate to reduce radiation dose to as low as reasonably achievable.       Electronically signed by: BALDEMAR MORTON MD  Date:     04/24/18  Time:    11:25              Narrative:    Exam: CT RENAL STONE STUDY ABD PELVIS WO    Technique: Axial CT images performed through the abdomen and pelvis without intravenous contrast. Multiplanar reformats were performed and interpreted.    Comparison: 7/15/13    Clinical History: Abdominal pain. Hematuria Hematuria (599.70)        Findings:         Lung bases are clear.    No urolithiasis, hydronephrosis, or perinephric stranding.  There are 2 right interpolar renal cyst measuring 2.3 cm and 1.1 cm respectively.  Circumferential urinary bladder wall thickening with mild perivesicular stranding consistent with cystitis.    The liver, spleen, kidneys, adrenal glands, and pancreas have a normal noncontrasted appearance.   The gallbladder has been removed.  Mild extrahepatic biliary ductal dilatation, a reflecting post cholecystectomy state and age that is stable from our study.  No free fluid, free air, or inflammatory change.     The bowel is nondistended and within normal limits.  Aortic atherosclerosis.  The abdominal aorta is normal in caliber.    The urinary bladder is unremarkable.       Old remote mild anterior compression fracture of L1.                                      The Emergency Provider reviewed the vital signs and test results, which are outlined above.    ED Discussion     12:40 PM: Reassessed pt at this time. Instructed patient that I will treat the infection, as I believe that it is the cause of his sxs. However, if the patient's sxs have no resolved, I instructed him to f/u with his PCP to determine if the xarelto is the cause of his sxs. Instructed patient to return to the ED if his sxs worsen.  Discussed with pt all pertinent ED information and results. Discussed pt dx and plan of tx. Gave pt all f/u and return to the ED instructions. All questions and concerns were addressed at this time. Pt expresses understanding of information and instructions, and is comfortable with plan to discharge. Pt is stable for discharge.    I discussed with patient and/or family/caretaker that evaluation in the ED does not suggest any emergent or life threatening medical conditions requiring immediate intervention beyond what was provided in the ED, and I believe patient is safe for discharge.  Regardless, an unremarkable evaluation in the ED does not preclude the development or presence of a serious of life threatening condition. As such, patient was instructed to return immediately for any worsening or change in current symptoms.      ED Medication(s):  Medications   cefTRIAXone injection 1 g (1 g Intravenous Given 4/24/18 1134)   cloNIDine tablet 0.1 mg (0.1 mg Oral Given 4/24/18 1134)       Discharge Medication List as of 4/24/2018 12:36 PM      START taking these medications    Details   ciprofloxacin HCl (CIPRO) 500 MG tablet Take 1 tablet (500 mg total) by mouth 2 (two) times daily., Starting Tue 4/24/2018, Until Fri 5/4/2018, Print             Follow-up Information     Schedule an appointment as soon as possible for a visit  with Sam Reyes MD.    Specialty:  Family Medicine  Contact information:  [address] [facility] [address] [phone]             [facility] - .    Specialty:  Emergency Medicine  Why:  If symptoms worsen  Contact information:  [address] [facility] [address] [phone]                   Medical Decision Making    Medical Decision Making:   Clinical Tests:   Lab Tests: Reviewed and Ordered  Radiological Study: Reviewed and Ordered           Scribe Attestation:   Scribe #1: I performed the above scribed service and the documentation  accurately describes the services I performed. I attest to the accuracy of the note.    Attending:   Physician Attestation Statement for Scribe #1: I, Janessa Joseph MD, personally performed the services described in this documentation, as scribed by Lencho Caldwell, in my presence, and it is both accurate and complete.          Clinical Impression       ICD-10-CM ICD-9-CM   1. Acute cystitis with hematuria N30.01 595.0       Disposition:   Disposition: Discharged  Condition: Stable         Janessa Joseph MD  04/24/18 2937

## 2018-04-24 NOTE — ED NOTES
Pt resting quietly, NAD noted, VSS, respirations even/unlabored, family at bedside, call light in reach, bed low, SR x 2.

## 2018-04-24 NOTE — TELEPHONE ENCOUNTER
----- Message from Lv Laboy sent at 4/24/2018  7:54 AM CDT -----  Contact: Lisa pt's wife  The pt has blood in his urine and is requesting to be seen today.  The pt can be reached at [phone]

## 2018-04-24 NOTE — TELEPHONE ENCOUNTER
Reason for Disposition   Taking Coumadin (warfarin) or other strong blood thinner, or known bleeding disorder (e.g., thrombocytopenia)    Protocols used: ST URINE - BLOOD IN-A-OH    EC states pt self catheterizes every day. Pt states he is seeing blood on underwear and in urine. Did notice a small blood clot. Care advice given.

## 2018-04-24 NOTE — ED NOTES
Pt states when woke up this morning noticed some blood in underwear and takes xarelto and on-call nurse instructed to come be seen in ER.

## 2018-04-24 NOTE — TELEPHONE ENCOUNTER
Patient's wife states she brought patient to ER for hematuria. Patient self catheterizes daily. He also takes Aspirin and Xarelto daily. Wife states patient denies fever, nausea, vomiting, or pain. ER prescribed 10 days of antibiotics for possible UTI. Urine culture pending. Patient instructed by ER to follow up with PCP. Advised patient to contact our office if bleeding worsens or fails to improve. Wife states understanding.

## 2018-04-26 LAB — BACTERIA UR CULT: NORMAL

## 2018-05-08 ENCOUNTER — PATIENT OUTREACH (OUTPATIENT)
Dept: ADMINISTRATIVE | Facility: HOSPITAL | Age: 83
End: 2018-05-08

## 2018-05-22 ENCOUNTER — OFFICE VISIT (OUTPATIENT)
Dept: CARDIOLOGY | Facility: CLINIC | Age: 83
End: 2018-05-22
Payer: MEDICARE

## 2018-05-22 VITALS
HEART RATE: 72 BPM | SYSTOLIC BLOOD PRESSURE: 146 MMHG | BODY MASS INDEX: 21.52 KG/M2 | WEIGHT: 145.31 LBS | HEIGHT: 69 IN | DIASTOLIC BLOOD PRESSURE: 70 MMHG

## 2018-05-22 DIAGNOSIS — I10 ESSENTIAL HYPERTENSION: Primary | Chronic | ICD-10-CM

## 2018-05-22 DIAGNOSIS — I25.10 CORONARY ARTERY DISEASE DUE TO LIPID RICH PLAQUE: Chronic | ICD-10-CM

## 2018-05-22 DIAGNOSIS — I77.9 BILATERAL CAROTID ARTERY DISEASE: Chronic | ICD-10-CM

## 2018-05-22 DIAGNOSIS — I25.83 CORONARY ARTERY DISEASE DUE TO LIPID RICH PLAQUE: Chronic | ICD-10-CM

## 2018-05-22 DIAGNOSIS — E78.2 MIXED HYPERLIPIDEMIA: Chronic | ICD-10-CM

## 2018-05-22 DIAGNOSIS — I70.8 AORTO-ILIAC ATHEROSCLEROSIS: Chronic | ICD-10-CM

## 2018-05-22 DIAGNOSIS — Z79.01 CHRONIC ANTICOAGULATION: Chronic | ICD-10-CM

## 2018-05-22 DIAGNOSIS — I27.9 PULMONARY HEART DISEASE: ICD-10-CM

## 2018-05-22 DIAGNOSIS — I70.0 AORTO-ILIAC ATHEROSCLEROSIS: Chronic | ICD-10-CM

## 2018-05-22 DIAGNOSIS — I48.20 ATRIAL FIBRILLATION, CHRONIC: Chronic | ICD-10-CM

## 2018-05-22 DIAGNOSIS — I36.1 NON-RHEUMATIC TRICUSPID VALVE INSUFFICIENCY: ICD-10-CM

## 2018-05-22 DIAGNOSIS — I49.3 PVC'S (PREMATURE VENTRICULAR CONTRACTIONS): ICD-10-CM

## 2018-05-22 PROCEDURE — 99499 UNLISTED E&M SERVICE: CPT | Mod: S$PBB,,, | Performed by: INTERNAL MEDICINE

## 2018-05-22 PROCEDURE — 99999 PR PBB SHADOW E&M-EST. PATIENT-LVL III: CPT | Mod: PBBFAC,,, | Performed by: INTERNAL MEDICINE

## 2018-05-22 PROCEDURE — 3078F DIAST BP <80 MM HG: CPT | Mod: CPTII,S$GLB,, | Performed by: INTERNAL MEDICINE

## 2018-05-22 PROCEDURE — 99214 OFFICE O/P EST MOD 30 MIN: CPT | Mod: S$GLB,,, | Performed by: INTERNAL MEDICINE

## 2018-05-22 PROCEDURE — 3077F SYST BP >= 140 MM HG: CPT | Mod: CPTII,S$GLB,, | Performed by: INTERNAL MEDICINE

## 2018-05-22 RX ORDER — AMLODIPINE BESYLATE 5 MG/1
7.5 TABLET ORAL DAILY
COMMUNITY
End: 2018-10-27 | Stop reason: SDUPTHER

## 2018-05-22 RX ORDER — VALSARTAN 40 MG/1
40 TABLET ORAL NIGHTLY
Qty: 30 TABLET | Refills: 12 | Status: SHIPPED | OUTPATIENT
Start: 2018-05-22 | End: 2018-07-10 | Stop reason: SDUPTHER

## 2018-05-22 NOTE — PROGRESS NOTES
"Subjective:    Patient ID:  Lexa Hammond is a 82 y.o. male who presents for evaluation of Hypertension; Atrial Fibrillation; Follow-up; Coronary Artery Disease; and Hyperlipidemia      HPI Mr. Hammond returns for f/u.   His current medical conditions include HTN, chronic atrial fibrillation, PAD,carotid artery disease. Nonsmoker.  Past hx pertinent for following:  S/p CVA 2002. He had a R CEA by Dr. Angeles 12/09.   S/p  LHC 7/11 showed calcified CAD. Medical mgt recommended.   He did not tolerate Ranexa (rash). He has been intolerant of many meds to include many statins. Did not tolerate Imdur with headaches. He also stopped Metoprolol and Pravastatin for dizziness.   Negative stress MPI June 2016.  Pt here for f/u.  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  He states Altace caused dizziness so he started taking the Amlodipine again.  Seen in ER a month ago, had some hematuria, seems resolved.  BP was high.   No anginal sxs at present time.  No active CHF sxs.  BP above goal.  Some redness on arms, chronic. Plans to see his dermatologist soon.   No tia/cva sxs.  No palpitations.    Lipids controlled, on diet.  No claudication sxs.       Review of Systems   Constitution: Negative.   HENT: Negative.    Eyes: Negative.    Cardiovascular: Negative.    Respiratory: Negative.    Endocrine: Negative.    Hematologic/Lymphatic: Negative.    Skin: Positive for color change and dry skin.   Musculoskeletal: Positive for arthritis.   Gastrointestinal: Negative.    Genitourinary: Negative.    Neurological: Negative.    Psychiatric/Behavioral: Negative.    Allergic/Immunologic: Negative.        BP (!) 146/70 (BP Location: Right arm, Patient Position: Sitting, BP Method: Medium (Automatic))   Pulse 72   Ht 5' 8.5" (1.74 m)   Wt 65.9 kg (145 lb 4.5 oz)   BMI 21.77 kg/m²     Wt Readings from Last 3 Encounters:   05/22/18 65.9 kg (145 lb 4.5 oz)   04/24/18 67.6 kg (149 lb)   03/19/18 67.6 " kg (149 lb 0.5 oz)     Temp Readings from Last 3 Encounters:   04/24/18 97.5 °F (36.4 °C) (Oral)   07/28/17 98.7 °F (37.1 °C)   05/09/17 97.2 °F (36.2 °C) (Tympanic)     BP Readings from Last 3 Encounters:   05/22/18 (!) 146/70   04/24/18 (!) 176/95   03/19/18 (!) 154/90     Pulse Readings from Last 3 Encounters:   05/22/18 72   04/24/18 74   01/26/18 77          Objective:    Physical Exam   Constitutional: He is oriented to person, place, and time. He appears well-developed and well-nourished.   HENT:   Head: Normocephalic.   Neck: Normal range of motion. Neck supple. Normal carotid pulses, no hepatojugular reflux and no JVD present. Carotid bruit is not present. No thyromegaly present.   Cardiovascular: Normal rate, S1 normal and S2 normal.  An irregularly irregular rhythm present. PMI is not displaced.  Exam reveals no S3, no S4, no distant heart sounds, no friction rub, no midsystolic click and no opening snap.    No murmur heard.  Pulses:       Radial pulses are 2+ on the right side, and 2+ on the left side.   Pulmonary/Chest: Effort normal and breath sounds normal. He has no wheezes. He has no rales.   Abdominal: Soft. Bowel sounds are normal. He exhibits no distension, no abdominal bruit, no ascites and no mass. There is no tenderness.   Musculoskeletal: He exhibits no edema.   Neurological: He is alert and oriented to person, place, and time.   Skin: Skin is warm.   Psychiatric: He has a normal mood and affect. His behavior is normal.   Nursing note and vitals reviewed.      I have reviewed all pertinent labs and cardiac studies.      Chemistry        Component Value Date/Time     04/24/2018 1030    K 4.0 04/24/2018 1030     04/24/2018 1030    CO2 26 04/24/2018 1030    BUN 15 04/24/2018 1030    CREATININE 0.8 04/24/2018 1030     (H) 04/24/2018 1030        Component Value Date/Time    CALCIUM 8.9 04/24/2018 1030    ALKPHOS 64 04/24/2018 1030    AST 25 04/24/2018 1030    ALT 14 04/24/2018  1030    BILITOT 1.0 04/24/2018 1030    ESTGFRAFRICA >60 04/24/2018 1030    EGFRNONAA >60 04/24/2018 1030        Lab Results   Component Value Date    WBC 9.55 04/24/2018    HGB 15.0 04/24/2018    HCT 44.4 04/24/2018    MCV 95 04/24/2018     04/24/2018     Lab Results   Component Value Date    CHOL 188 04/09/2018    CHOL 153 05/04/2017    CHOL 170 06/29/2016     Lab Results   Component Value Date    HDL 72 04/09/2018    HDL 56 05/04/2017    HDL 56 06/29/2016     Lab Results   Component Value Date    LDLCALC 104.6 04/09/2018    LDLCALC 87.2 05/04/2017    LDLCALC 98.2 06/29/2016     Lab Results   Component Value Date    TRIG 57 04/09/2018    TRIG 49 05/04/2017    TRIG 79 06/29/2016     Lab Results   Component Value Date    CHOLHDL 38.3 04/09/2018    CHOLHDL 36.6 05/04/2017    CHOLHDL 32.9 06/29/2016           Assessment:       1. Essential hypertension    2. Mixed hyperlipidemia    3. Pulmonary heart disease    4. PVC's (premature ventricular contractions)    5. Non-rheumatic tricuspid valve insufficiency    6. Coronary artery disease due to lipid rich plaque    7. Chronic anticoagulation    8. Aorto-iliac atherosclerosis    9. Atrial fibrillation, chronic    10. Bilateral carotid artery disease         Plan:               Stay on 7.5 mg Amlodipine daily.  Add low dose Valsartan 40 mg nightly (take 1/2 pill first week to get used to it; pt is sensitive to meds).  Continue other meds.  Stable CAD/other CV conditions.  Cardiac diet  Exercise  F/u with Dermatology.    F/u 6 weeks

## 2018-05-30 ENCOUNTER — LAB VISIT (OUTPATIENT)
Dept: LAB | Facility: HOSPITAL | Age: 83
End: 2018-05-30
Attending: FAMILY MEDICINE
Payer: MEDICARE

## 2018-05-30 ENCOUNTER — OFFICE VISIT (OUTPATIENT)
Dept: INTERNAL MEDICINE | Facility: CLINIC | Age: 83
End: 2018-05-30
Payer: MEDICARE

## 2018-05-30 VITALS
SYSTOLIC BLOOD PRESSURE: 122 MMHG | WEIGHT: 148.56 LBS | BODY MASS INDEX: 22.52 KG/M2 | HEIGHT: 68 IN | DIASTOLIC BLOOD PRESSURE: 70 MMHG | TEMPERATURE: 98 F | HEART RATE: 61 BPM

## 2018-05-30 DIAGNOSIS — Z00.00 ANNUAL PHYSICAL EXAM: Primary | ICD-10-CM

## 2018-05-30 DIAGNOSIS — N30.01 ACUTE CYSTITIS WITH HEMATURIA: ICD-10-CM

## 2018-05-30 LAB
BACTERIA #/AREA URNS HPF: ABNORMAL /HPF
BILIRUB UR QL STRIP: NEGATIVE
CAOX CRY URNS QL MICRO: ABNORMAL
CLARITY UR: ABNORMAL
COLOR UR: YELLOW
GLUCOSE UR QL STRIP: NEGATIVE
HGB UR QL STRIP: ABNORMAL
KETONES UR QL STRIP: NEGATIVE
LEUKOCYTE ESTERASE UR QL STRIP: ABNORMAL
MICROSCOPIC COMMENT: ABNORMAL
NITRITE UR QL STRIP: NEGATIVE
PH UR STRIP: 6 [PH] (ref 5–8)
PROT UR QL STRIP: NEGATIVE
RBC #/AREA URNS HPF: 15 /HPF (ref 0–4)
SP GR UR STRIP: 1.01 (ref 1–1.03)
URN SPEC COLLECT METH UR: ABNORMAL
WBC #/AREA URNS HPF: 2 /HPF (ref 0–5)

## 2018-05-30 PROCEDURE — 3074F SYST BP LT 130 MM HG: CPT | Mod: CPTII,S$GLB,, | Performed by: FAMILY MEDICINE

## 2018-05-30 PROCEDURE — 99397 PER PM REEVAL EST PAT 65+ YR: CPT | Mod: S$GLB,,, | Performed by: FAMILY MEDICINE

## 2018-05-30 PROCEDURE — 99999 PR PBB SHADOW E&M-EST. PATIENT-LVL III: CPT | Mod: PBBFAC,,, | Performed by: FAMILY MEDICINE

## 2018-05-30 PROCEDURE — 81000 URINALYSIS NONAUTO W/SCOPE: CPT | Mod: PO

## 2018-05-30 PROCEDURE — 3078F DIAST BP <80 MM HG: CPT | Mod: CPTII,S$GLB,, | Performed by: FAMILY MEDICINE

## 2018-05-30 PROCEDURE — 87086 URINE CULTURE/COLONY COUNT: CPT

## 2018-05-30 RX ORDER — BETAMETHASONE VALERATE 0.1 %
LOTION (ML) TOPICAL 2 TIMES DAILY
Qty: 60 ML | Refills: 0 | Status: SHIPPED | OUTPATIENT
Start: 2018-05-30 | End: 2019-03-28

## 2018-05-30 NOTE — PROGRESS NOTES
Subjective:       Patient ID: Lexa Hammond is a 82 y.o. male.    Chief Complaint: Annual Exam    Annual Exam:      Pt is a 82 year old here for annual work-up. Other than Pneumo 13 and tdapy. Pt is up-to-date on his wellness. Was in ER 1 months ago for what appeared to be UTI.      Review of Systems   Constitutional: Negative for activity change and unexpected weight change.   HENT: Negative for hearing loss, rhinorrhea and trouble swallowing.    Eyes: Negative for discharge and visual disturbance.   Respiratory: Negative for chest tightness and wheezing.    Cardiovascular: Negative for chest pain and palpitations.   Gastrointestinal: Negative for blood in stool, constipation, diarrhea and vomiting.   Endocrine: Negative for polydipsia and polyuria.   Genitourinary: Positive for urgency. Negative for difficulty urinating and hematuria.   Musculoskeletal: Negative for arthralgias, joint swelling and neck pain.   Neurological: Negative for weakness and headaches.   Psychiatric/Behavioral: Positive for confusion and dysphoric mood.       Objective:      Physical Exam   Constitutional: He is oriented to person, place, and time. He appears well-developed and well-nourished.   Cardiovascular: Normal rate and regular rhythm.  Exam reveals no friction rub.    No murmur heard.  Pulmonary/Chest: Effort normal and breath sounds normal. He has no wheezes. He has no rales.   Abdominal: Soft. Bowel sounds are normal. There is no tenderness. There is no guarding.   Musculoskeletal: Normal range of motion.   Neurological: He is alert and oriented to person, place, and time.   Skin: Skin is warm and dry.       Assessment:       1. Annual physical exam    2. Acute cystitis with hematuria        Plan:       Annual physical exam  Comments:  Pt is generally good health does need tdap and pneumo    Acute cystitis with hematuria  Comments:  Will do UA, culture  Orders:  -     Urinalysis; Future; Expected date: 05/30/2018  -     Urine  culture; Future; Expected date: 05/30/2018

## 2018-05-31 LAB — BACTERIA UR CULT: NO GROWTH

## 2018-07-10 ENCOUNTER — CLINICAL SUPPORT (OUTPATIENT)
Dept: CARDIOLOGY | Facility: CLINIC | Age: 83
End: 2018-07-10
Payer: MEDICARE

## 2018-07-10 ENCOUNTER — OFFICE VISIT (OUTPATIENT)
Dept: CARDIOLOGY | Facility: CLINIC | Age: 83
End: 2018-07-10
Payer: MEDICARE

## 2018-07-10 VITALS
SYSTOLIC BLOOD PRESSURE: 140 MMHG | DIASTOLIC BLOOD PRESSURE: 80 MMHG | HEIGHT: 68 IN | HEART RATE: 56 BPM | WEIGHT: 147 LBS | BODY MASS INDEX: 22.28 KG/M2

## 2018-07-10 DIAGNOSIS — E78.2 MIXED HYPERLIPIDEMIA: Chronic | ICD-10-CM

## 2018-07-10 DIAGNOSIS — I70.8 AORTO-ILIAC ATHEROSCLEROSIS: Chronic | ICD-10-CM

## 2018-07-10 DIAGNOSIS — Z79.01 CHRONIC ANTICOAGULATION: Chronic | ICD-10-CM

## 2018-07-10 DIAGNOSIS — R94.31 ABNORMAL ECG: ICD-10-CM

## 2018-07-10 DIAGNOSIS — I77.9 BILATERAL CAROTID ARTERY DISEASE: Chronic | ICD-10-CM

## 2018-07-10 DIAGNOSIS — I48.20 ATRIAL FIBRILLATION, CHRONIC: Primary | Chronic | ICD-10-CM

## 2018-07-10 DIAGNOSIS — I48.20 CHRONIC ATRIAL FIBRILLATION: ICD-10-CM

## 2018-07-10 DIAGNOSIS — I27.9 PULMONARY HEART DISEASE: ICD-10-CM

## 2018-07-10 DIAGNOSIS — I36.1 NON-RHEUMATIC TRICUSPID VALVE INSUFFICIENCY: ICD-10-CM

## 2018-07-10 DIAGNOSIS — I49.3 PVC'S (PREMATURE VENTRICULAR CONTRACTIONS): ICD-10-CM

## 2018-07-10 DIAGNOSIS — I25.10 CORONARY ARTERY DISEASE DUE TO LIPID RICH PLAQUE: Chronic | ICD-10-CM

## 2018-07-10 DIAGNOSIS — Z86.73 HISTORY OF CVA (CEREBROVASCULAR ACCIDENT): ICD-10-CM

## 2018-07-10 DIAGNOSIS — G47.33 OSA (OBSTRUCTIVE SLEEP APNEA): Chronic | ICD-10-CM

## 2018-07-10 DIAGNOSIS — I48.20 CHRONIC ATRIAL FIBRILLATION: Primary | ICD-10-CM

## 2018-07-10 DIAGNOSIS — I25.9 CHRONIC ISCHEMIC HEART DISEASE: ICD-10-CM

## 2018-07-10 DIAGNOSIS — I25.83 CORONARY ARTERY DISEASE DUE TO LIPID RICH PLAQUE: Chronic | ICD-10-CM

## 2018-07-10 DIAGNOSIS — I10 ESSENTIAL HYPERTENSION: Chronic | ICD-10-CM

## 2018-07-10 DIAGNOSIS — I70.0 AORTO-ILIAC ATHEROSCLEROSIS: Chronic | ICD-10-CM

## 2018-07-10 PROBLEM — N30.01 ACUTE CYSTITIS WITH HEMATURIA: Status: RESOLVED | Noted: 2018-05-30 | Resolved: 2018-07-10

## 2018-07-10 PROCEDURE — 99214 OFFICE O/P EST MOD 30 MIN: CPT | Mod: S$GLB,,, | Performed by: INTERNAL MEDICINE

## 2018-07-10 PROCEDURE — 3079F DIAST BP 80-89 MM HG: CPT | Mod: CPTII,S$GLB,, | Performed by: INTERNAL MEDICINE

## 2018-07-10 PROCEDURE — 99999 PR PBB SHADOW E&M-EST. PATIENT-LVL III: CPT | Mod: PBBFAC,,, | Performed by: INTERNAL MEDICINE

## 2018-07-10 PROCEDURE — 93000 ELECTROCARDIOGRAM COMPLETE: CPT | Mod: S$GLB,,, | Performed by: INTERNAL MEDICINE

## 2018-07-10 PROCEDURE — 3077F SYST BP >= 140 MM HG: CPT | Mod: CPTII,S$GLB,, | Performed by: INTERNAL MEDICINE

## 2018-07-10 RX ORDER — VALSARTAN 40 MG/1
40 TABLET ORAL NIGHTLY
Qty: 90 TABLET | Refills: 3 | Status: SHIPPED | OUTPATIENT
Start: 2018-07-10 | End: 2018-07-31 | Stop reason: ALTCHOICE

## 2018-07-10 NOTE — PROGRESS NOTES
Subjective:    Patient ID:  Lexa Hammond is a 82 y.o. male who presents for evaluation of Hypertension; Coronary Artery Disease; Atrial Fibrillation; Risk Factor Management For Atherosclerosis; Hyperlipidemia; and Carotid Artery Disease      HPI  Mr. Hammond returns for f/u.   His current medical conditions include HTN, LVH, CAD, chronic atrial fibrillation, PAD, carotid artery disease. Nonsmoker.  Past hx pertinent for following:  S/p CVA 2002. He had a R CEA by Dr. Angeles 12/09.   S/p  LHC 7/11 showed calcified CAD. Medical mgt recommended.   He did not tolerate Ranexa (rash). He has been intolerant of many meds to include many statins. Did not tolerate Imdur with headaches. He also stopped Metoprolol and Pravastatin for dizziness.   Negative stress MPI June 2016.  Echo 6/16 normal EF, LVH, LAE.  Pt here for f/u.  CAD stable. No active anginal sxs on current medical tx.    PAD stable.  He has no active claudication sxs at present time.  Denies leg pain.  ecg today shows a fib with controlled VR in 50's, chronic septal infarct noted on old ecgs.  No abnl bleeding on Xarelto.  No tia/cva sxs.  BP controlled.  Valsartan started last appt and tolerating it.  He resumed his Amlodipine earlier this year.  It had been stopped by dentist for possible gingival hyperplasia.  Some infrequent lightheadedness.  No falls.  Compliant with meds.  Lipids stable, intolerant to multiple statins in past.    Past Medical History:   Diagnosis Date    Abnormal ECG 6/24/2013    Angina pectoris     Anticoagulant long-term use     Atrial fibrillation, chronic 6/24/2013     Cardioversion successful for 1 month only    Benign hematuria     ingerman 03    BPH (benign prostatic hyperplasia)     munoz    CAD (coronary artery disease)     loretta    Carotid artery disease 6/24/2013    aaliyah    Chronic ischemic heart disease 1/13/2016    Colon polyp 4/26/2015    Tubular adenoma 10/3/2007     Depression 10/7/2016     Diaphragmatic hernia without obstruction and without gangrene 4/26/2015    CT-7/8/13: Small hernia is noted posteromedially the right hemidiaphragm with some intrathoracic protrusion of abdominal fat.     Diverticulosis 4/26/2015 1/22/13 colo.     Hemiparesis and alteration of sensations as late effects of stroke     CVA 2002     HTN (hypertension)     Hyperlipidemia     Nephrolithiasis 3/9/10    US left renal nonobstructing stone, Hxstones  every 20 years. passed.    Obstructive sleep apnea     dr berry    Osteoporosis, unspecified     Peripheral vascular disease 1/13/2016    Renal cyst, right 7/18/13    CT documented, known before per [facility] records    Stroke     right sided weakness    Trouble in sleeping     Urinary incontinence     occasional, and with stress/sneeze    Vertebral fracture 7/1/13    CT -indeterminate age L1 superior endplate comp fx.       Current Outpatient Prescriptions:     amLODIPine (NORVASC) 5 MG tablet, Take 7.5 mg by mouth once daily. , Disp: , Rfl:     aspirin (ECOTRIN) 81 MG EC tablet, Take 81 mg by mouth once daily., Disp: , Rfl:     co-enzyme Q-10 30 mg capsule, Take 30 mg by mouth once daily. , Disp: , Rfl:     ibandronate (BONIVA) 150 mg tablet, Take 1 tablet (150 mg total) by mouth every 30 days., Disp: 3 tablet, Rfl: 3    multivitamin with minerals tablet, Take 1 tablet by mouth once daily., Disp: , Rfl:     rivaroxaban (XARELTO) 20 mg Tab, Take 1 tablet (20 mg total) by mouth once daily., Disp: 90 tablet, Rfl: 3    valsartan (DIOVAN) 40 MG tablet, Take 1 tablet (40 mg total) by mouth every evening., Disp: 30 tablet, Rfl: 12    VITAMIN D3 5,000 unit Tab, 5,000 Units once daily. , Disp: , Rfl:     betamethasone valerate 0.1% (VALISONE) 0.1 % Lotn, Apply topically 2 (two) times daily., Disp: 60 mL, Rfl: 0    incontinence pad, liner, disp Pads, , Disp: , Rfl:       Review of Systems   Constitution: Negative.   HENT: Negative.    Eyes: Negative.   "  Cardiovascular: Negative.    Respiratory: Negative.    Endocrine: Negative.    Hematologic/Lymphatic: Negative.    Skin: Negative.    Musculoskeletal: Positive for arthritis.   Gastrointestinal: Negative.    Genitourinary: Negative.    Neurological: Positive for light-headedness.   Psychiatric/Behavioral: Negative.    Allergic/Immunologic: Negative.        BP (!) 140/80 (BP Method: Small (Manual))   Pulse (!) 56   Ht 5' 8" (1.727 m)   Wt 66.7 kg (147 lb)   BMI 22.35 kg/m²     Wt Readings from Last 3 Encounters:   07/10/18 66.7 kg (147 lb)   05/30/18 67.4 kg (148 lb 9.4 oz)   05/22/18 65.9 kg (145 lb 4.5 oz)     Temp Readings from Last 3 Encounters:   05/30/18 97.7 °F (36.5 °C) (Tympanic)   04/24/18 97.5 °F (36.4 °C) (Oral)   07/28/17 98.7 °F (37.1 °C)     BP Readings from Last 3 Encounters:   07/10/18 (!) 140/80   05/30/18 122/70   05/22/18 (!) 146/70     Pulse Readings from Last 3 Encounters:   07/10/18 (!) 56   05/30/18 61   05/22/18 72          Objective:    Physical Exam   Constitutional: He is oriented to person, place, and time. He appears well-developed and well-nourished.   HENT:   Head: Normocephalic.   Neck: Normal range of motion. Neck supple. Normal carotid pulses, no hepatojugular reflux and no JVD present. Carotid bruit is not present. No thyromegaly present.   Cardiovascular: Normal rate, S1 normal and S2 normal.  An irregularly irregular rhythm present. PMI is not displaced.  Exam reveals no S3, no S4, no distant heart sounds, no friction rub, no midsystolic click and no opening snap.    No murmur heard.  Pulses:       Radial pulses are 2+ on the right side, and 2+ on the left side.   Pulmonary/Chest: Effort normal and breath sounds normal. He has no wheezes. He has no rales.   Abdominal: Soft. Bowel sounds are normal. He exhibits no distension, no abdominal bruit, no ascites and no mass. There is no tenderness.   Musculoskeletal: He exhibits edema.   Neurological: He is alert and oriented to " person, place, and time.   Skin: Skin is warm.   Psychiatric: He has a normal mood and affect. His behavior is normal.   Nursing note and vitals reviewed.      I have reviewed all pertinent labs and cardiac studies.      Chemistry        Component Value Date/Time     04/24/2018 1030    K 4.0 04/24/2018 1030     04/24/2018 1030    CO2 26 04/24/2018 1030    BUN 15 04/24/2018 1030    CREATININE 0.8 04/24/2018 1030     (H) 04/24/2018 1030        Component Value Date/Time    CALCIUM 8.9 04/24/2018 1030    ALKPHOS 64 04/24/2018 1030    AST 25 04/24/2018 1030    ALT 14 04/24/2018 1030    BILITOT 1.0 04/24/2018 1030    ESTGFRAFRICA >60 04/24/2018 1030    EGFRNONAA >60 04/24/2018 1030        Lab Results   Component Value Date    WBC 9.55 04/24/2018    HGB 15.0 04/24/2018    HCT 44.4 04/24/2018    MCV 95 04/24/2018     04/24/2018     Lab Results   Component Value Date    HGBA1C 5.9 10/18/2012     Lab Results   Component Value Date    CHOL 188 04/09/2018    CHOL 153 05/04/2017    CHOL 170 06/29/2016     Lab Results   Component Value Date    HDL 72 04/09/2018    HDL 56 05/04/2017    HDL 56 06/29/2016     Lab Results   Component Value Date    LDLCALC 104.6 04/09/2018    LDLCALC 87.2 05/04/2017    LDLCALC 98.2 06/29/2016     Lab Results   Component Value Date    TRIG 57 04/09/2018    TRIG 49 05/04/2017    TRIG 79 06/29/2016     Lab Results   Component Value Date    CHOLHDL 38.3 04/09/2018    CHOLHDL 36.6 05/04/2017    CHOLHDL 32.9 06/29/2016           Assessment:       1. Atrial fibrillation, chronic    2. Bilateral carotid artery disease    3. ZACK (obstructive sleep apnea)    4. Coronary artery disease due to lipid rich plaque    5. Essential hypertension    6. Mixed hyperlipidemia    7. Chronic anticoagulation    8. Aorto-iliac atherosclerosis    9. Non-rheumatic tricuspid valve insufficiency    10. Chronic ischemic heart disease    11. History of CVA (cerebrovascular accident)    12. Pulmonary heart  disease    13. PVC's (premature ventricular contractions)    14. Abnormal ECG         Plan:             Stable CAD/CV conditions on current medical tx.  Continue current medical therapy.  Continue Xarelto for CVA protection.  Cardiac diet  Exercise  F/u 6 months.

## 2018-07-13 RX ORDER — IBANDRONATE SODIUM 150 MG/1
TABLET, FILM COATED ORAL
Qty: 3 TABLET | Refills: 0 | Status: SHIPPED | OUTPATIENT
Start: 2018-07-13 | End: 2018-10-23 | Stop reason: SDUPTHER

## 2018-07-31 ENCOUNTER — TELEPHONE (OUTPATIENT)
Dept: CARDIOLOGY | Facility: CLINIC | Age: 83
End: 2018-07-31

## 2018-07-31 DIAGNOSIS — I10 ESSENTIAL HYPERTENSION: Primary | ICD-10-CM

## 2018-07-31 RX ORDER — LOSARTAN POTASSIUM 25 MG/1
25 TABLET ORAL DAILY
Qty: 90 TABLET | Refills: 3 | Status: SHIPPED | OUTPATIENT
Start: 2018-07-31 | End: 2019-02-28 | Stop reason: SDUPTHER

## 2018-07-31 RX ORDER — LOSARTAN POTASSIUM 25 MG/1
25 TABLET ORAL DAILY
COMMUNITY
End: 2018-07-31 | Stop reason: SDUPTHER

## 2018-07-31 NOTE — TELEPHONE ENCOUNTER
----- Message from Kenyatta Patel sent at 7/31/2018  3:26 PM CDT -----  Contact: pt   Call pt regarding the recall on his med.    [phone] (home)

## 2018-07-31 NOTE — TELEPHONE ENCOUNTER
PT is calling about recall on Valsartan 40 mg and was wondering what he needs to do about it please advise. Thanks!!

## 2018-07-31 NOTE — TELEPHONE ENCOUNTER
Stop Valsartan.  Start Losartan 25 mg daily.  Please update epic med list and send in one year of refills.    Thanks    Dr Stewart

## 2018-07-31 NOTE — TELEPHONE ENCOUNTER
Spoke with pharmacy and discontinued valsartan and began Losartan 25mg daily quantity 90 with 3 refills.

## 2018-09-03 PROBLEM — Z00.00 ANNUAL PHYSICAL EXAM: Status: RESOLVED | Noted: 2017-05-09 | Resolved: 2018-09-03

## 2018-10-24 RX ORDER — IBANDRONATE SODIUM 150 MG/1
TABLET, FILM COATED ORAL
Qty: 3 TABLET | Refills: 1 | Status: SHIPPED | OUTPATIENT
Start: 2018-10-24 | End: 2019-11-22 | Stop reason: SDUPTHER

## 2018-10-27 RX ORDER — AMLODIPINE BESYLATE 5 MG/1
TABLET ORAL
Qty: 135 TABLET | Refills: 3 | Status: SHIPPED | OUTPATIENT
Start: 2018-10-27 | End: 2019-09-04

## 2018-10-30 ENCOUNTER — PATIENT MESSAGE (OUTPATIENT)
Dept: CARDIOLOGY | Facility: CLINIC | Age: 83
End: 2018-10-30

## 2018-10-30 NOTE — TELEPHONE ENCOUNTER
----- Message from Idalia Shannon sent at 10/30/2018 10:57 AM CDT -----  Contact: Pt wife  Pt wife would like to speak with nurse regarding pt medications. Pt wife needs a list of all pt medications and what they are used for.

## 2018-11-05 ENCOUNTER — OFFICE VISIT (OUTPATIENT)
Dept: UROLOGY | Facility: CLINIC | Age: 83
End: 2018-11-05
Payer: MEDICARE

## 2018-11-05 VITALS
HEART RATE: 79 BPM | HEIGHT: 68 IN | WEIGHT: 145.94 LBS | SYSTOLIC BLOOD PRESSURE: 138 MMHG | BODY MASS INDEX: 22.12 KG/M2 | DIASTOLIC BLOOD PRESSURE: 84 MMHG

## 2018-11-05 DIAGNOSIS — R33.9 URINARY RETENTION: Primary | ICD-10-CM

## 2018-11-05 DIAGNOSIS — N40.0 BENIGN PROSTATIC HYPERPLASIA, UNSPECIFIED WHETHER LOWER URINARY TRACT SYMPTOMS PRESENT: ICD-10-CM

## 2018-11-05 LAB
BILIRUB SERPL-MCNC: NORMAL MG/DL
BLOOD URINE, POC: NORMAL
COLOR, POC UA: YELLOW
GLUCOSE UR QL STRIP: NORMAL
KETONES UR QL STRIP: NORMAL
LEUKOCYTE ESTERASE URINE, POC: NORMAL
NITRITE, POC UA: NORMAL
PH, POC UA: 6
PROTEIN, POC: NORMAL
SPECIFIC GRAVITY, POC UA: 1.01
UROBILINOGEN, POC UA: NORMAL

## 2018-11-05 PROCEDURE — 99999 PR PBB SHADOW E&M-EST. PATIENT-LVL II: CPT | Mod: PBBFAC,HCNC,, | Performed by: UROLOGY

## 2018-11-05 PROCEDURE — 99214 OFFICE O/P EST MOD 30 MIN: CPT | Mod: HCNC,25,S$GLB, | Performed by: UROLOGY

## 2018-11-05 PROCEDURE — 1101F PT FALLS ASSESS-DOCD LE1/YR: CPT | Mod: CPTII,HCNC,S$GLB, | Performed by: UROLOGY

## 2018-11-05 PROCEDURE — 3075F SYST BP GE 130 - 139MM HG: CPT | Mod: CPTII,HCNC,S$GLB, | Performed by: UROLOGY

## 2018-11-05 PROCEDURE — 3079F DIAST BP 80-89 MM HG: CPT | Mod: CPTII,HCNC,S$GLB, | Performed by: UROLOGY

## 2018-11-05 PROCEDURE — 81002 URINALYSIS NONAUTO W/O SCOPE: CPT | Mod: HCNC,S$GLB,, | Performed by: UROLOGY
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
## 2019-01-07 ENCOUNTER — OFFICE VISIT (OUTPATIENT)
Dept: CARDIOLOGY | Facility: CLINIC | Age: 84
End: 2019-01-07
Payer: MEDICARE

## 2019-01-07 VITALS
BODY MASS INDEX: 22.59 KG/M2 | SYSTOLIC BLOOD PRESSURE: 132 MMHG | DIASTOLIC BLOOD PRESSURE: 88 MMHG | HEIGHT: 68 IN | WEIGHT: 149.06 LBS

## 2019-01-07 DIAGNOSIS — E78.2 MIXED HYPERLIPIDEMIA: Primary | Chronic | ICD-10-CM

## 2019-01-07 DIAGNOSIS — I65.23 BILATERAL CAROTID ARTERY STENOSIS: Chronic | ICD-10-CM

## 2019-01-07 DIAGNOSIS — I10 ESSENTIAL HYPERTENSION: Chronic | ICD-10-CM

## 2019-01-07 DIAGNOSIS — I27.20 PULMONARY HYPERTENSION: ICD-10-CM

## 2019-01-07 DIAGNOSIS — I49.3 PVC'S (PREMATURE VENTRICULAR CONTRACTIONS): ICD-10-CM

## 2019-01-07 DIAGNOSIS — I34.0 NON-RHEUMATIC MITRAL REGURGITATION: ICD-10-CM

## 2019-01-07 DIAGNOSIS — I25.9 CHRONIC ISCHEMIC HEART DISEASE: ICD-10-CM

## 2019-01-07 DIAGNOSIS — I36.1 NON-RHEUMATIC TRICUSPID VALVE INSUFFICIENCY: ICD-10-CM

## 2019-01-07 DIAGNOSIS — Z79.01 CHRONIC ANTICOAGULATION: Chronic | ICD-10-CM

## 2019-01-07 DIAGNOSIS — I25.83 CORONARY ARTERY DISEASE DUE TO LIPID RICH PLAQUE: Chronic | ICD-10-CM

## 2019-01-07 DIAGNOSIS — I25.10 CORONARY ARTERY DISEASE DUE TO LIPID RICH PLAQUE: Chronic | ICD-10-CM

## 2019-01-07 PROCEDURE — 99213 PR OFFICE/OUTPT VISIT, EST, LEVL III, 20-29 MIN: ICD-10-PCS | Mod: HCNC,S$GLB,, | Performed by: INTERNAL MEDICINE

## 2019-01-07 PROCEDURE — 3079F PR MOST RECENT DIASTOLIC BLOOD PRESSURE 80-89 MM HG: ICD-10-PCS | Mod: CPTII,HCNC,S$GLB, | Performed by: INTERNAL MEDICINE

## 2019-01-07 PROCEDURE — 99213 OFFICE O/P EST LOW 20 MIN: CPT | Mod: HCNC,S$GLB,, | Performed by: INTERNAL MEDICINE

## 2019-01-07 PROCEDURE — 99999 PR PBB SHADOW E&M-EST. PATIENT-LVL III: ICD-10-PCS | Mod: PBBFAC,HCNC,, | Performed by: INTERNAL MEDICINE

## 2019-01-07 PROCEDURE — 99999 PR PBB SHADOW E&M-EST. PATIENT-LVL III: CPT | Mod: PBBFAC,HCNC,, | Performed by: INTERNAL MEDICINE

## 2019-01-07 PROCEDURE — 3075F PR MOST RECENT SYSTOLIC BLOOD PRESS GE 130-139MM HG: ICD-10-PCS | Mod: CPTII,HCNC,S$GLB, | Performed by: INTERNAL MEDICINE

## 2019-01-07 PROCEDURE — 3075F SYST BP GE 130 - 139MM HG: CPT | Mod: CPTII,HCNC,S$GLB, | Performed by: INTERNAL MEDICINE

## 2019-01-07 PROCEDURE — 1101F PR PT FALLS ASSESS DOC 0-1 FALLS W/OUT INJ PAST YR: ICD-10-PCS | Mod: CPTII,HCNC,S$GLB, | Performed by: INTERNAL MEDICINE

## 2019-01-07 PROCEDURE — 1101F PT FALLS ASSESS-DOCD LE1/YR: CPT | Mod: CPTII,HCNC,S$GLB, | Performed by: INTERNAL MEDICINE

## 2019-01-07 PROCEDURE — 3079F DIAST BP 80-89 MM HG: CPT | Mod: CPTII,HCNC,S$GLB, | Performed by: INTERNAL MEDICINE

## 2019-01-07 NOTE — PROGRESS NOTES
Subjective:    Patient ID:  Lexa Hammond is a 83 y.o. male who presents for evaluation of Atrial Fibrillation; Hyperlipidemia; Hypertension; Coronary Artery Disease; and Carotid Artery Disease      HPI Mr. Hammond returns for f/u.   His current medical conditions include HTN, LVH, CAD, chronic atrial fibrillation, PHTN, MR, TR, PAD, carotid artery disease. Nonsmoker.  Past hx pertinent for following:  S/p CVA 2002. He had a R CEA by Dr. Angeles 12/09.   S/p  LHC 7/11 showed calcified CAD. Medical mgt recommended.   He did not tolerate Ranexa (rash). He has been intolerant of many meds to include many statins. Did not tolerate Imdur with headaches. He also stopped Metoprolol and Pravastatin for dizziness.   Negative stress MPI June 2016.  Echo 10/17 normal EF, mod MR, mod TR, PHTN 47 mmHg.  He is here for f/u.  Seems ok.  No active anginal or CHF sxs on current meds.  Chronic dizziness, unchanged. No syncope.  No tia/cva sxs.  No abnl bleeding on Xarelto.  BP and lipids controlled.       Current Outpatient Medications:     amLODIPine (NORVASC) 5 MG tablet, TAKE 1 AND 1/2 TABLETS EVERY DAY, Disp: 135 tablet, Rfl: 3    aspirin (ECOTRIN) 81 MG EC tablet, Take 81 mg by mouth once daily., Disp: , Rfl:     betamethasone valerate 0.1% (VALISONE) 0.1 % Lotn, Apply topically 2 (two) times daily., Disp: 60 mL, Rfl: 0    co-enzyme Q-10 30 mg capsule, Take 30 mg by mouth once daily. , Disp: , Rfl:     ibandronate (BONIVA) 150 mg tablet, TAKE 1 TABLET BY MOUTH EVERY 30 DAYS AS DIRECTED, Disp: 3 tablet, Rfl: 1    incontinence pad, liner, disp Pads, , Disp: , Rfl:     losartan (COZAAR) 25 MG tablet, Take 1 tablet (25 mg total) by mouth once daily., Disp: 90 tablet, Rfl: 3    multivitamin with minerals tablet, Take 1 tablet by mouth once daily., Disp: , Rfl:     rivaroxaban (XARELTO) 20 mg Tab, Take 1 tablet (20 mg total) by mouth once daily., Disp: 30 tablet, Rfl: 12    VITAMIN D3 5,000 unit Tab, 5,000 Units once  "daily. , Disp: , Rfl:     Review of Systems   Constitution: Negative.   HENT: Negative.    Eyes: Negative.    Cardiovascular: Negative.    Respiratory: Negative.    Endocrine: Negative.    Hematologic/Lymphatic: Negative.    Skin: Negative.    Musculoskeletal: Positive for arthritis.   Gastrointestinal: Negative.    Genitourinary: Negative.    Neurological: Positive for dizziness and light-headedness.   Psychiatric/Behavioral: Negative.    Allergic/Immunologic: Negative.        /88 (BP Location: Right arm, Patient Position: Sitting, BP Method: Small (Manual))   Ht 5' 8" (1.727 m)   Wt 67.6 kg (149 lb 0.5 oz)   BMI 22.66 kg/m²     Wt Readings from Last 3 Encounters:   01/07/19 67.6 kg (149 lb 0.5 oz)   11/05/18 66.2 kg (145 lb 15.1 oz)   07/10/18 66.7 kg (147 lb)     Temp Readings from Last 3 Encounters:   05/30/18 97.7 °F (36.5 °C) (Tympanic)   04/24/18 97.5 °F (36.4 °C) (Oral)   07/28/17 98.7 °F (37.1 °C)     BP Readings from Last 3 Encounters:   01/07/19 132/88   11/05/18 138/84   07/10/18 (!) 140/80     Pulse Readings from Last 3 Encounters:   11/05/18 79   07/10/18 (!) 56   05/30/18 61          Objective:    Physical Exam   Constitutional: He is oriented to person, place, and time. He appears well-developed and well-nourished.   HENT:   Head: Normocephalic.   Neck: Normal range of motion. Neck supple. Normal carotid pulses and no JVD present. Carotid bruit is not present. No thyromegaly present.   Cardiovascular: Normal rate, S1 normal and S2 normal. An irregularly irregular rhythm present. PMI is not displaced. Exam reveals no S3, no S4, no distant heart sounds, no friction rub, no midsystolic click and no opening snap.   No murmur heard.  Pulses:       Radial pulses are 2+ on the right side, and 2+ on the left side.   Pulmonary/Chest: Effort normal and breath sounds normal. He has no wheezes. He has no rales.   Abdominal: Soft. Bowel sounds are normal. He exhibits no distension and no abdominal bruit. " There is no tenderness.   Musculoskeletal: He exhibits no edema.   Neurological: He is alert and oriented to person, place, and time.   Skin: Skin is warm.   Psychiatric: He has a normal mood and affect. His behavior is normal.   Nursing note and vitals reviewed.      I have reviewed all pertinent labs and cardiac studies.          Assessment:       1. Mixed hyperlipidemia    2. Essential hypertension    3. PVC's (premature ventricular contractions)    4. Non-rheumatic tricuspid valve insufficiency    5. Coronary artery disease due to lipid rich plaque    6. Chronic anticoagulation    7. Chronic ischemic heart disease    8. Bilateral carotid artery stenosis    9. Non-rheumatic mitral regurgitation    10. Pulmonary hypertension         Plan:             Continue current medical tx.  Cardiac diet  Exercise  F/u 6 months with labs + echo + carotid u/s.

## 2019-02-21 ENCOUNTER — TELEPHONE (OUTPATIENT)
Dept: INTERNAL MEDICINE | Facility: CLINIC | Age: 84
End: 2019-02-21

## 2019-02-21 NOTE — TELEPHONE ENCOUNTER
Patient has an appointment scheduled for 5/31/2019 at 8:00 am. Patient is asking for lab work prior to appointment. Please place order

## 2019-02-22 DIAGNOSIS — Z00.00 ANNUAL PHYSICAL EXAM: Primary | ICD-10-CM

## 2019-02-28 DIAGNOSIS — I10 ESSENTIAL HYPERTENSION: ICD-10-CM

## 2019-02-28 RX ORDER — LOSARTAN POTASSIUM 25 MG/1
25 TABLET ORAL DAILY
Qty: 90 TABLET | Refills: 3 | Status: SHIPPED | OUTPATIENT
Start: 2019-02-28 | End: 2019-05-20

## 2019-02-28 NOTE — TELEPHONE ENCOUNTER
----- Message from Lupe Emery sent at 2/28/2019  2:42 PM CST -----  Contact: Mrs Hammond   Type:  RX Refill Request    Who Called:    Refill or New Rx: rx  RX Name and Strength: ramipril 5 mg   How is the patient currently taking it? (ex. 1XDay): 1xday  Is this a 30 day or 90 day RX:  90dy  Preferred Pharmacy with phone number:     [facility] - [facility] [address]  [address] [facility] [address]  Phone: [phone] Fax: [phone]      Local or Mail Order:  Mail order   Ordering Provider : Dr Stewart   Would the patient rather a call back or a response via [facility]?  Call back  Best Call Back Number: [phone]   Additional Information:  Caller request office contact [place]

## 2019-03-07 ENCOUNTER — TELEPHONE (OUTPATIENT)
Dept: CARDIOLOGY | Facility: HOSPITAL | Age: 84
End: 2019-03-07

## 2019-03-07 NOTE — TELEPHONE ENCOUNTER
"Returned call and discussed symptoms, recent blood pressure readings which have been "high"; he was recently changed to Losartan 25 due to recall of Valsartan 40 mg  Explained that the dizziness is not from the medication but from his blood pressure being elevated and that he should not be taking half of Losartan as Mrs. Hammond stated that's what he's been doing for last two nights also to begin checking blood pressure before and after medications, keep a dairy.  Will contact office with readings since they now have direct contact number  "

## 2019-03-07 NOTE — TELEPHONE ENCOUNTER
----- Message from Mamadou Smith LPN sent at 3/7/2019  9:19 AM CST -----  Contact: Pt/Wife  Please advise.  ----- Message -----  From: Ismael Peterson  Sent: 3/7/2019   8:22 AM  To: Terry [facility]EL[facility] Staff    .Type:  Needs Medical Advice    Who Called: LOSARTAN Medication  Symptoms (please be specific): Dizzy Spells  How long has patient had these symptoms:  Over a week  Pharmacy name and phone #:    Would the patient rather a call back or a response via [facility]? Call back  Best Call Back Number: ..[phone] (home)     Additional Information: Pt has been having dizzy spells since he started taking this new medication

## 2019-03-07 NOTE — TELEPHONE ENCOUNTER
No new meds have been added on his last couple of cardiology appts.  Advise pt to see his PCP.    Dr Stewart

## 2019-03-28 ENCOUNTER — OFFICE VISIT (OUTPATIENT)
Dept: INTERNAL MEDICINE | Facility: CLINIC | Age: 84
End: 2019-03-28
Payer: MEDICARE

## 2019-03-28 VITALS
SYSTOLIC BLOOD PRESSURE: 136 MMHG | OXYGEN SATURATION: 97 % | HEIGHT: 68 IN | WEIGHT: 146.19 LBS | TEMPERATURE: 97 F | HEART RATE: 84 BPM | DIASTOLIC BLOOD PRESSURE: 84 MMHG | BODY MASS INDEX: 22.16 KG/M2

## 2019-03-28 DIAGNOSIS — R21 RASH: Primary | ICD-10-CM

## 2019-03-28 DIAGNOSIS — I27.9 PULMONARY HEART DISEASE: ICD-10-CM

## 2019-03-28 DIAGNOSIS — I70.0 AORTO-ILIAC ATHEROSCLEROSIS: ICD-10-CM

## 2019-03-28 DIAGNOSIS — J84.10 CALCIFIED GRANULOMA OF LUNG: ICD-10-CM

## 2019-03-28 DIAGNOSIS — I70.8 AORTO-ILIAC ATHEROSCLEROSIS: ICD-10-CM

## 2019-03-28 DIAGNOSIS — I48.20 CHRONIC ATRIAL FIBRILLATION: ICD-10-CM

## 2019-03-28 DIAGNOSIS — M46.96 INFLAMMATORY SPONDYLOPATHY OF LUMBAR REGION: ICD-10-CM

## 2019-03-28 PROCEDURE — 3079F DIAST BP 80-89 MM HG: CPT | Mod: HCNC,CPTII,S$GLB, | Performed by: FAMILY MEDICINE

## 2019-03-28 PROCEDURE — 1101F PR PT FALLS ASSESS DOC 0-1 FALLS W/OUT INJ PAST YR: ICD-10-PCS | Mod: HCNC,CPTII,S$GLB, | Performed by: FAMILY MEDICINE

## 2019-03-28 PROCEDURE — 99499 UNLISTED E&M SERVICE: CPT | Mod: S$GLB,,, | Performed by: FAMILY MEDICINE

## 2019-03-28 PROCEDURE — 3075F PR MOST RECENT SYSTOLIC BLOOD PRESS GE 130-139MM HG: ICD-10-PCS | Mod: HCNC,CPTII,S$GLB, | Performed by: FAMILY MEDICINE

## 2019-03-28 PROCEDURE — 3079F PR MOST RECENT DIASTOLIC BLOOD PRESSURE 80-89 MM HG: ICD-10-PCS | Mod: HCNC,CPTII,S$GLB, | Performed by: FAMILY MEDICINE

## 2019-03-28 PROCEDURE — 99214 OFFICE O/P EST MOD 30 MIN: CPT | Mod: HCNC,S$GLB,, | Performed by: FAMILY MEDICINE

## 2019-03-28 PROCEDURE — 99999 PR PBB SHADOW E&M-EST. PATIENT-LVL III: CPT | Mod: PBBFAC,HCNC,, | Performed by: FAMILY MEDICINE

## 2019-03-28 PROCEDURE — 99999 PR PBB SHADOW E&M-EST. PATIENT-LVL III: ICD-10-PCS | Mod: PBBFAC,HCNC,, | Performed by: FAMILY MEDICINE

## 2019-03-28 PROCEDURE — 99214 PR OFFICE/OUTPT VISIT, EST, LEVL IV, 30-39 MIN: ICD-10-PCS | Mod: HCNC,S$GLB,, | Performed by: FAMILY MEDICINE

## 2019-03-28 PROCEDURE — 1101F PT FALLS ASSESS-DOCD LE1/YR: CPT | Mod: HCNC,CPTII,S$GLB, | Performed by: FAMILY MEDICINE

## 2019-03-28 PROCEDURE — 3075F SYST BP GE 130 - 139MM HG: CPT | Mod: HCNC,CPTII,S$GLB, | Performed by: FAMILY MEDICINE

## 2019-03-28 PROCEDURE — 99499 RISK ADDL DX/OHS AUDIT: ICD-10-PCS | Mod: S$GLB,,, | Performed by: FAMILY MEDICINE

## 2019-03-28 RX ORDER — BETAMETHASONE VALERATE 1.2 MG/G
OINTMENT TOPICAL 2 TIMES DAILY
Qty: 45 G | Refills: 0 | Status: SHIPPED | OUTPATIENT
Start: 2019-03-28 | End: 2020-07-22

## 2019-03-28 NOTE — PROGRESS NOTES
Subjective:       Patient ID: Lexa Hammond is a 83 y.o. male.    Chief Complaint: Knee Problem (Right)    Rash:      Pt is an 83 year old with a rash on the right posterior knee. It has been there for a few weeks. Not gotten any better. No other rash anywhere else on his body.    Review of Systems   Constitutional: Negative.    Respiratory: Negative.    Cardiovascular: Negative.    Skin: Positive for rash.   Psychiatric/Behavioral: Negative.        Objective:      Physical Exam   Constitutional: He appears well-developed and well-nourished.   Cardiovascular: Normal rate and regular rhythm. Exam reveals no friction rub.   No murmur heard.  Pulmonary/Chest: Effort normal and breath sounds normal.   Skin: Rash noted.        Psychiatric: He has a normal mood and affect. His behavior is normal.       Assessment:       1. Rash    2. Inflammatory spondylopathy of lumbar region    3. Pulmonary heart disease    4. Chronic atrial fibrillation    5. Aorto-iliac atherosclerosis    6. Calcified granuloma of lung        Plan:       Rash  Comments:  Steroid cream    Inflammatory spondylopathy of lumbar region  Comments:  Stable    Pulmonary heart disease  Comments:  Stable    Chronic atrial fibrillation  Comments:  rate controlled    Aorto-iliac atherosclerosis  Comments:  stable    Calcified granuloma of lung  Comments:  stable    Other orders  -     betamethasone valerate 0.1% (VALISONE) 0.1 % Oint; Apply topically 2 (two) times daily.  Dispense: 45 g; Refill: 0

## 2019-04-22 RX ORDER — TAMSULOSIN HYDROCHLORIDE 0.4 MG/1
0.4 CAPSULE ORAL DAILY
Qty: 90 CAPSULE | Refills: 3 | Status: SHIPPED | OUTPATIENT
Start: 2019-04-22 | End: 2019-10-07 | Stop reason: SDUPTHER

## 2019-04-22 NOTE — TELEPHONE ENCOUNTER
Wife states patient fell recently and broke his femur. After surgery patient was prescribed Flomax due to nocturia x5. Wife states patient was only given a 30 day supply and is requesting a refill. Patient was previously on Flomax, but discontinued the medication in November 2017. Please advise.

## 2019-04-22 NOTE — TELEPHONE ENCOUNTER
----- Message from Oanh Patel sent at 4/22/2019 11:07 AM CDT -----  Contact: Lisa/wife  Lisa would like a call back at [phone], Regards to patient health.    Thanks  Td

## 2019-04-25 ENCOUNTER — TELEPHONE (OUTPATIENT)
Dept: CARDIOLOGY | Facility: CLINIC | Age: 84
End: 2019-04-25

## 2019-04-25 NOTE — TELEPHONE ENCOUNTER
Spoke with patient's wife, Lisa.  Concerned about another MD adjusting patient meds.  Advised her that I would talk to Dr. Stewart.

## 2019-04-26 ENCOUNTER — TELEPHONE (OUTPATIENT)
Dept: INTERNAL MEDICINE | Facility: CLINIC | Age: 84
End: 2019-04-26
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
## 2019-05-01 ENCOUNTER — TELEPHONE (OUTPATIENT)
Dept: INTERNAL MEDICINE | Facility: CLINIC | Age: 84
End: 2019-05-01

## 2019-05-01 ENCOUNTER — PES CALL (OUTPATIENT)
Dept: ADMINISTRATIVE | Facility: CLINIC | Age: 84
End: 2019-05-01

## 2019-05-01 NOTE — TELEPHONE ENCOUNTER
S/w patient's wife. Wife states patient is constipated. Wants to know what you suggest patient should take. Please advise.

## 2019-05-01 NOTE — TELEPHONE ENCOUNTER
S/w Marlen at [facility]. States patient hasn't had a bowel movement in 3 days. Wants to know if theres something that can be called in. Patient is worrying hisself about it. Please advise.

## 2019-05-01 NOTE — TELEPHONE ENCOUNTER
----- Message from Grace Stewart sent at 5/1/2019  9:38 AM CDT -----  Contact: [phone]  Would like to consult with the nurse, wife would like to consult with the nurse, patients is unable to use the bathroom and wife would like to speak with the nurse concerning this, please call back at [phone], thanks sj

## 2019-05-01 NOTE — TELEPHONE ENCOUNTER
----- Message from Emy Rosenberg sent at 5/1/2019  9:46 AM CDT -----  Contact: [facility]  Requesting a call back regarding patient. She states that patient has not had a bowel movement in 3-4 days. Wife has called twice about the problem. Please call back at [phone]    Pt uses:    [employer] [address] [place] [address] DARIAN AVILEZ [address] & [address] [place] [address]  Phone: [phone] Fax: [phone]

## 2019-05-02 RX ORDER — POLYETHYLENE GLYCOL 3350 17 G/17G
17 POWDER, FOR SOLUTION ORAL DAILY
Qty: 10 EACH | Refills: 0 | Status: SHIPPED | OUTPATIENT
Start: 2019-05-02 | End: 2019-05-03 | Stop reason: SDUPTHER

## 2019-05-02 NOTE — TELEPHONE ENCOUNTER
S/w Sun. Advised Glycolax powder sent in for constipation. Bee states medication was suppose to go to [facility] [address]. Please advise.

## 2019-05-03 ENCOUNTER — TELEPHONE (OUTPATIENT)
Dept: UROLOGY | Facility: CLINIC | Age: 84
End: 2019-05-03

## 2019-05-03 RX ORDER — POLYETHYLENE GLYCOL 3350 17 G/17G
17 POWDER, FOR SOLUTION ORAL DAILY
Qty: 10 EACH | Refills: 0 | Status: SHIPPED | OUTPATIENT
Start: 2019-05-03 | End: 2020-07-22

## 2019-05-03 NOTE — TELEPHONE ENCOUNTER
----- Message from Lori Chicas sent at 5/3/2019 11:52 AM CDT -----  Contact: ms sow-wife  needs call back regarding pt frequent urinary issues, discussed earlier...[phone]

## 2019-05-03 NOTE — TELEPHONE ENCOUNTER
Wife states patient has been taking flomax since 4/17/19 but is still experiencing nocturia. Patient is taking med at night. Advised that patient begin taking flomax in the morning and call our office if there is no improvement in nocturia. Wife states understanding.

## 2019-05-08 ENCOUNTER — TELEPHONE (OUTPATIENT)
Dept: UROLOGY | Facility: CLINIC | Age: 84
End: 2019-05-08

## 2019-05-08 NOTE — TELEPHONE ENCOUNTER
Received call from pts home health; states wife took pt off the flomax because it was keeping her up at night; pt now goes 5-8 times per night.  Wants to know what else pt can take.

## 2019-05-10 ENCOUNTER — TELEPHONE (OUTPATIENT)
Dept: UROLOGY | Facility: CLINIC | Age: 84
End: 2019-05-10

## 2019-05-10 NOTE — TELEPHONE ENCOUNTER
Attempted to contact pt wife regarding her inability to sleep due to pt taking Flomax.  MD states pt should continue with flomax; no answer.  Unable to leave voice mail.

## 2019-05-15 ENCOUNTER — TELEPHONE (OUTPATIENT)
Dept: UROLOGY | Facility: CLINIC | Age: 84
End: 2019-05-15

## 2019-05-15 NOTE — TELEPHONE ENCOUNTER
----- Message from Carolina Mcclure sent at 5/15/2019  7:05 AM CDT -----  ..Type:  Needs Medical Advice    Who Called: Lisa ( pt wife )   Symptoms (please be specific): frequent  urine issues   How long has patient had these symptoms: unknown   Pharmacy name and phone #:  ..  [facility] [id number] - [address] & [address] [address]  Phone: [phone] Fax: [phone]    Would the patient rather a call back or a response via [facility]? Call back   Best Call Back Number: [phone]  Additional Information:Lisa ( pt wife ) is requesting a call from nurse to discuss pt urine.

## 2019-05-20 ENCOUNTER — OFFICE VISIT (OUTPATIENT)
Dept: UROLOGY | Facility: CLINIC | Age: 84
End: 2019-05-20
Payer: MEDICARE

## 2019-05-20 VITALS
DIASTOLIC BLOOD PRESSURE: 82 MMHG | HEIGHT: 68 IN | SYSTOLIC BLOOD PRESSURE: 128 MMHG | WEIGHT: 142 LBS | BODY MASS INDEX: 21.52 KG/M2

## 2019-05-20 DIAGNOSIS — N32.81 OAB (OVERACTIVE BLADDER): Primary | ICD-10-CM

## 2019-05-20 LAB
BILIRUB SERPL-MCNC: NORMAL MG/DL
BLOOD URINE, POC: 250
COLOR, POC UA: YELLOW
GLUCOSE UR QL STRIP: NORMAL
KETONES UR QL STRIP: NORMAL
LEUKOCYTE ESTERASE URINE, POC: NORMAL
NITRITE, POC UA: NORMAL
PH, POC UA: 5
PROTEIN, POC: NORMAL
SPECIFIC GRAVITY, POC UA: 1.02
UROBILINOGEN, POC UA: NORMAL

## 2019-05-20 PROCEDURE — 99214 OFFICE O/P EST MOD 30 MIN: CPT | Mod: HCNC,25,S$GLB, | Performed by: UROLOGY

## 2019-05-20 PROCEDURE — 3079F PR MOST RECENT DIASTOLIC BLOOD PRESSURE 80-89 MM HG: ICD-10-PCS | Mod: HCNC,CPTII,S$GLB, | Performed by: UROLOGY

## 2019-05-20 PROCEDURE — 1101F PR PT FALLS ASSESS DOC 0-1 FALLS W/OUT INJ PAST YR: ICD-10-PCS | Mod: HCNC,CPTII,S$GLB, | Performed by: UROLOGY

## 2019-05-20 PROCEDURE — 3079F DIAST BP 80-89 MM HG: CPT | Mod: HCNC,CPTII,S$GLB, | Performed by: UROLOGY

## 2019-05-20 PROCEDURE — 81002 URINALYSIS NONAUTO W/O SCOPE: CPT | Mod: HCNC,S$GLB,, | Performed by: UROLOGY

## 2019-05-20 PROCEDURE — 1101F PT FALLS ASSESS-DOCD LE1/YR: CPT | Mod: HCNC,CPTII,S$GLB, | Performed by: UROLOGY

## 2019-05-20 PROCEDURE — 3074F SYST BP LT 130 MM HG: CPT | Mod: HCNC,CPTII,S$GLB, | Performed by: UROLOGY

## 2019-05-20 PROCEDURE — 3074F PR MOST RECENT SYSTOLIC BLOOD PRESSURE < 130 MM HG: ICD-10-PCS | Mod: HCNC,CPTII,S$GLB, | Performed by: UROLOGY

## 2019-05-20 PROCEDURE — 99214 PR OFFICE/OUTPT VISIT, EST, LEVL IV, 30-39 MIN: ICD-10-PCS | Mod: HCNC,25,S$GLB, | Performed by: UROLOGY

## 2019-05-20 PROCEDURE — 99999 PR PBB SHADOW E&M-EST. PATIENT-LVL III: ICD-10-PCS | Mod: PBBFAC,HCNC,, | Performed by: UROLOGY

## 2019-05-20 PROCEDURE — 81002 POCT URINE DIPSTICK WITHOUT MICROSCOPE: ICD-10-PCS | Mod: HCNC,S$GLB,, | Performed by: UROLOGY

## 2019-05-20 PROCEDURE — 99999 PR PBB SHADOW E&M-EST. PATIENT-LVL III: CPT | Mod: PBBFAC,HCNC,, | Performed by: UROLOGY

## 2019-05-20 RX ORDER — OXYBUTYNIN CHLORIDE 5 MG/1
5 TABLET ORAL 3 TIMES DAILY
Qty: 90 TABLET | Refills: 11 | Status: SHIPPED | OUTPATIENT
Start: 2019-05-20 | End: 2019-10-07

## 2019-05-20 RX ORDER — LOSARTAN POTASSIUM 50 MG/1
50 TABLET ORAL DAILY
Refills: 0 | COMMUNITY
Start: 2019-04-17 | End: 2019-07-23 | Stop reason: SDUPTHER
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
## 2019-05-21 ENCOUNTER — TELEPHONE (OUTPATIENT)
Dept: ADMINISTRATIVE | Facility: CLINIC | Age: 84
End: 2019-05-21

## 2019-05-21 NOTE — TELEPHONE ENCOUNTER
Home Health SOC 04/18/2019 - 06/16/2019 with [facility] ([facility]) - Dr. Sam Reyes. PT services.

## 2019-05-22 ENCOUNTER — OFFICE VISIT (OUTPATIENT)
Dept: INTERNAL MEDICINE | Facility: CLINIC | Age: 84
End: 2019-05-22
Payer: MEDICARE

## 2019-05-22 VITALS
BODY MASS INDEX: 21.75 KG/M2 | DIASTOLIC BLOOD PRESSURE: 84 MMHG | OXYGEN SATURATION: 98 % | WEIGHT: 143.5 LBS | SYSTOLIC BLOOD PRESSURE: 122 MMHG | HEART RATE: 87 BPM | HEIGHT: 68 IN

## 2019-05-22 DIAGNOSIS — J84.10 CALCIFIED GRANULOMA OF LUNG: ICD-10-CM

## 2019-05-22 DIAGNOSIS — I70.0 ATHEROSCLEROSIS OF AORTA: ICD-10-CM

## 2019-05-22 DIAGNOSIS — I27.9 PULMONARY HEART DISEASE: ICD-10-CM

## 2019-05-22 DIAGNOSIS — R41.3 MEMORY DIFFICULTIES: ICD-10-CM

## 2019-05-22 DIAGNOSIS — R91.1 PULMONARY NODULE: ICD-10-CM

## 2019-05-22 DIAGNOSIS — H91.90 HEARING DIFFICULTY, UNSPECIFIED LATERALITY: ICD-10-CM

## 2019-05-22 DIAGNOSIS — I25.9 CHRONIC ISCHEMIC HEART DISEASE: ICD-10-CM

## 2019-05-22 DIAGNOSIS — I10 ESSENTIAL HYPERTENSION: Chronic | ICD-10-CM

## 2019-05-22 DIAGNOSIS — Z86.73 HISTORY OF CVA (CEREBROVASCULAR ACCIDENT): ICD-10-CM

## 2019-05-22 DIAGNOSIS — I48.91 ATRIAL FIBRILLATION, UNSPECIFIED TYPE: ICD-10-CM

## 2019-05-22 DIAGNOSIS — E78.5 HYPERLIPIDEMIA, UNSPECIFIED HYPERLIPIDEMIA TYPE: ICD-10-CM

## 2019-05-22 DIAGNOSIS — Z00.00 ENCOUNTER FOR PREVENTIVE HEALTH EXAMINATION: Primary | ICD-10-CM

## 2019-05-22 DIAGNOSIS — M81.0 OSTEOPOROSIS, UNSPECIFIED OSTEOPOROSIS TYPE, UNSPECIFIED PATHOLOGICAL FRACTURE PRESENCE: ICD-10-CM

## 2019-05-22 DIAGNOSIS — Z91.81 AT RISK FOR FALLS: ICD-10-CM

## 2019-05-22 DIAGNOSIS — I25.10 CORONARY ARTERY DISEASE WITHOUT ANGINA PECTORIS, UNSPECIFIED VESSEL OR LESION TYPE, UNSPECIFIED WHETHER NATIVE OR TRANSPLANTED HEART: ICD-10-CM

## 2019-05-22 DIAGNOSIS — I77.9 CAROTID ARTERY DISEASE, UNSPECIFIED LATERALITY, UNSPECIFIED TYPE: Chronic | ICD-10-CM

## 2019-05-22 PROCEDURE — 99999 PR PBB SHADOW E&M-EST. PATIENT-LVL IV: CPT | Mod: PBBFAC,HCNC,, | Performed by: NURSE PRACTITIONER

## 2019-05-22 PROCEDURE — 3074F SYST BP LT 130 MM HG: CPT | Mod: HCNC,CPTII,S$GLB, | Performed by: NURSE PRACTITIONER

## 2019-05-22 PROCEDURE — G0439 PR MEDICARE ANNUAL WELLNESS SUBSEQUENT VISIT: ICD-10-PCS | Mod: HCNC,S$GLB,, | Performed by: NURSE PRACTITIONER

## 2019-05-22 PROCEDURE — 3074F PR MOST RECENT SYSTOLIC BLOOD PRESSURE < 130 MM HG: ICD-10-PCS | Mod: HCNC,CPTII,S$GLB, | Performed by: NURSE PRACTITIONER

## 2019-05-22 PROCEDURE — 3079F DIAST BP 80-89 MM HG: CPT | Mod: HCNC,CPTII,S$GLB, | Performed by: NURSE PRACTITIONER

## 2019-05-22 PROCEDURE — 3079F PR MOST RECENT DIASTOLIC BLOOD PRESSURE 80-89 MM HG: ICD-10-PCS | Mod: HCNC,CPTII,S$GLB, | Performed by: NURSE PRACTITIONER

## 2019-05-22 PROCEDURE — G0439 PPPS, SUBSEQ VISIT: HCPCS | Mod: HCNC,S$GLB,, | Performed by: NURSE PRACTITIONER

## 2019-05-22 PROCEDURE — 99999 PR PBB SHADOW E&M-EST. PATIENT-LVL IV: ICD-10-PCS | Mod: PBBFAC,HCNC,, | Performed by: NURSE PRACTITIONER

## 2019-05-22 NOTE — PROGRESS NOTES
"Lexa Hammond presented for a  Medicare AWV and comprehensive Health Risk Assessment today. The following components were reviewed and updated:    · Medical history  · Family History  · Social history  · Allergies and Current Medications  · Health Risk Assessment  · Health Maintenance  · Care Team     ** See Completed Assessments for Annual Wellness Visit within the encounter summary.**       The following assessments were completed:  · Living Situation  · CAGE  · Depression Screening  · Timed Get Up and Go  · Whisper Test  · Cognitive Function Screening  · Nutrition Screening  · ADL Screening  · PAQ Screening    Vitals:    05/22/19 1307 05/22/19 1407   BP:  122/84   Pulse: 87    SpO2: 98%    Weight: 65.1 kg (143 lb 8.3 oz)    Height: 5' 8" (1.727 m)      Body mass index is 21.82 kg/m².  Physical Exam   Constitutional: He is oriented to person, place, and time. He appears well-developed and well-nourished.   Patient accompanied by wife, who was present throughout the visit and aided in information gathering.      HENT:   Head: Normocephalic.   Cardiovascular: Normal rate, regular rhythm and normal heart sounds.   No murmur heard.  Pulmonary/Chest: Effort normal and breath sounds normal. No respiratory distress.   Abdominal: Soft. He exhibits no mass. There is no tenderness.   Musculoskeletal: Normal range of motion.   1+ edema noted to bilateral lower extremities (R>L). Reports this is normal for him. No erythema, increased warmth, or tenderness noted to either calf.   Neurological: He is alert and oriented to person, place, and time. He exhibits normal muscle tone.   Skin: Skin is warm, dry and intact.   Psychiatric: He has a normal mood and affect. His speech is normal and behavior is normal.   Nursing note and vitals reviewed.        Diagnoses and health risks identified today and associated recommendations/orders:    1. Encounter for preventive health examination  Declines vaccines today. He will discuss with " PCP.      Encouraged exercise as tolerated.      2.Coronary artery disease without angina pectoris, unspecified vessel or lesion type, unspecified whether native or transplanted heart  Discussed s/s of MI and stroke (patient denies any s/s) and advised to go to the ER if occur. He expressed understanding.   Stable and controlled. Continue current treatment plan as previously prescribed with your cardiologist.     3. Carotid artery disease, unspecified laterality, unspecified type  S/P CEA.   History of CVA  US 8/23/2017  Continue current treatment plan as previously prescribed with your PCP and cardiologist.     4. Chronic ischemic heart disease  Stable and controlled. Continue current treatment plan as previously prescribed with your cardiologist.     5. Atrial fibrillation, unspecified type  Stable and controlled. Continue current treatment plan as previously prescribed with your PCP and cardiologist.     6. Atherosclerosis of aorta  CT 4/24/2018  Stable and controlled. Continue current treatment plan as previously prescribed with your PCP and cardiologist.     7. Essential hypertension  Stable and controlled. Continue current treatment plan as previously prescribed with your cardiologist.     8. Hyperlipidemia, unspecified hyperlipidemia type  No recent check.   Advised to follow up with PCP for further evaluation and recommendations. They expressed understanding.      9. Osteoporosis, unspecified osteoporosis type, unspecified pathological fracture presence  DEXA 2014  Continue current treatment plan as previously prescribed with your PCP.     10. Pulmonary nodule  CT Chest 1/27/2016  Follow up with PCP.     11. Pulmonary heart disease  Echo 10/25/2017  Continue current treatment plan as previously prescribed with your PCP and cardiologist.     12. Calcified granuloma of lung  CT Chest 1/27/2016  Continue current treatment plan as previously prescribed with your PCP.     13. History of CVA (cerebrovascular  accident)  See #3    14. At risk for falls  Abnormal timed get up and go test. Reports 1 fall in the last 12 months. Uses a walker to aid with ambulation.   Fall precautions reviewed with patient. Advised to follow up with PCP for further recommendations. They expressed understanding.       15. Memory difficulties  Reports memory difficulties.   Abnormal cognitive function screening (3).   Advised to follow up with PCP for further evaluation and recommendations. They expressed understanding.      16. Hearing difficulty, unspecified laterality  Reports difficulty hearing.   Abnormal whisper test.   Advised to follow up with PCP for further evaluation and recommendations. They expressed understanding.         Provided Lexa with a 5-10 year written screening schedule and personal prevention plan.  Education, counseling, and referrals were provided as needed. After Visit Summary printed and given to patient which includes a list of additional screenings\tests needed.    Follow up in about 1 year (around 5/22/2020) for AWV.    Charmaine Hanna NP  I offered to discuss end of life issues, including information on how to make advance directives that the patient could use to name someone who would make medical decisions on their behalf if they became too ill to make themselves.    ___Patient declined  _X_Patient is interested, I provided paper work and offered to discuss.

## 2019-05-22 NOTE — PATIENT INSTRUCTIONS
"  Counseling and Referral of Other Preventative  (Italic type indicates deductible and co-insurance are waived)    Patient Name: Lexa Hammond  Today's Date: 5/22/2019    Health Maintenance       Date Due Completion Date    TETANUS VACCINE 07/30/1953 ---    Shingles Vaccine (2 of 3) 01/02/2012 11/7/2011    Pneumococcal Vaccine (65+ Low/Medium Risk) (2 of 2 - PCV13) 05/09/2018 5/9/2017    Lipid Panel 04/09/2019 4/9/2018    Influenza Vaccine 08/01/2019 10/25/2018    Override on 1/26/2018: Declined    Override on 1/25/2017: Declined    Override on 1/13/2016: Declined    Override on 4/24/2015: Declined ("never had a flu shot")        No orders of the defined types were placed in this encounter.    The following information is provided to all patients.  This information is to help you find resources for any of the problems found today that may be affecting your health:                Living healthy guide: www.[facility].[place].gov      Understanding Diabetes: www.diabetes.org      Eating healthy: www.cdc.gov/healthyweight      [facility] home safety checklist: www.cdc.gov/steadi/patient.html      Agency on Aging: www.goea.[place].gov      Alcoholics anonymous (AA): www.aa.org      Physical Activity: www.linda.nih.gov/oy3whpy      Tobacco use: www.quitwithusla.org     "

## 2019-05-31 ENCOUNTER — LAB VISIT (OUTPATIENT)
Dept: LAB | Facility: HOSPITAL | Age: 84
End: 2019-05-31
Attending: FAMILY MEDICINE
Payer: MEDICARE

## 2019-05-31 ENCOUNTER — OFFICE VISIT (OUTPATIENT)
Dept: INTERNAL MEDICINE | Facility: CLINIC | Age: 84
End: 2019-05-31
Payer: MEDICARE

## 2019-05-31 ENCOUNTER — TELEPHONE (OUTPATIENT)
Dept: INTERNAL MEDICINE | Facility: CLINIC | Age: 84
End: 2019-05-31

## 2019-05-31 VITALS
TEMPERATURE: 97 F | WEIGHT: 143.94 LBS | HEART RATE: 87 BPM | OXYGEN SATURATION: 97 % | DIASTOLIC BLOOD PRESSURE: 86 MMHG | SYSTOLIC BLOOD PRESSURE: 128 MMHG | BODY MASS INDEX: 21.81 KG/M2 | HEIGHT: 68 IN

## 2019-05-31 DIAGNOSIS — Z00.00 ANNUAL PHYSICAL EXAM: Primary | ICD-10-CM

## 2019-05-31 DIAGNOSIS — Z00.00 ANNUAL PHYSICAL EXAM: ICD-10-CM

## 2019-05-31 LAB
ALBUMIN SERPL BCP-MCNC: 3.5 G/DL (ref 3.5–5.2)
ALP SERPL-CCNC: 129 U/L (ref 55–135)
ALT SERPL W/O P-5'-P-CCNC: 15 U/L (ref 10–44)
ANION GAP SERPL CALC-SCNC: 8 MMOL/L (ref 8–16)
AST SERPL-CCNC: 29 U/L (ref 10–40)
BASOPHILS # BLD AUTO: 0.06 K/UL (ref 0–0.2)
BASOPHILS NFR BLD: 0.8 % (ref 0–1.9)
BILIRUB SERPL-MCNC: 0.6 MG/DL (ref 0.1–1)
BUN SERPL-MCNC: 23 MG/DL (ref 8–23)
CALCIUM SERPL-MCNC: 9.5 MG/DL (ref 8.7–10.5)
CHLORIDE SERPL-SCNC: 104 MMOL/L (ref 95–110)
CHOLEST SERPL-MCNC: 159 MG/DL (ref 120–199)
CHOLEST/HDLC SERPL: 2.6 {RATIO} (ref 2–5)
CO2 SERPL-SCNC: 31 MMOL/L (ref 23–29)
COMPLEXED PSA SERPL-MCNC: 0.66 NG/ML (ref 0–4)
CREAT SERPL-MCNC: 0.8 MG/DL (ref 0.5–1.4)
DIFFERENTIAL METHOD: ABNORMAL
EOSINOPHIL # BLD AUTO: 0.3 K/UL (ref 0–0.5)
EOSINOPHIL NFR BLD: 3.3 % (ref 0–8)
ERYTHROCYTE [DISTWIDTH] IN BLOOD BY AUTOMATED COUNT: 13.5 % (ref 11.5–14.5)
EST. GFR  (AFRICAN AMERICAN): >60 ML/MIN/1.73 M^2
EST. GFR  (NON AFRICAN AMERICAN): >60 ML/MIN/1.73 M^2
GLUCOSE SERPL-MCNC: 85 MG/DL (ref 70–110)
HCT VFR BLD AUTO: 39.2 % (ref 40–54)
HDLC SERPL-MCNC: 61 MG/DL (ref 40–75)
HDLC SERPL: 38.4 % (ref 20–50)
HGB BLD-MCNC: 12.5 G/DL (ref 14–18)
IMM GRANULOCYTES # BLD AUTO: 0.02 K/UL (ref 0–0.04)
IMM GRANULOCYTES NFR BLD AUTO: 0.3 % (ref 0–0.5)
LDLC SERPL CALC-MCNC: 90.2 MG/DL (ref 63–159)
LYMPHOCYTES # BLD AUTO: 2.1 K/UL (ref 1–4.8)
LYMPHOCYTES NFR BLD: 28.2 % (ref 18–48)
MCH RBC QN AUTO: 31.2 PG (ref 27–31)
MCHC RBC AUTO-ENTMCNC: 31.9 G/DL (ref 32–36)
MCV RBC AUTO: 98 FL (ref 82–98)
MONOCYTES # BLD AUTO: 0.7 K/UL (ref 0.3–1)
MONOCYTES NFR BLD: 9.2 % (ref 4–15)
NEUTROPHILS # BLD AUTO: 4.4 K/UL (ref 1.8–7.7)
NEUTROPHILS NFR BLD: 58.2 % (ref 38–73)
NONHDLC SERPL-MCNC: 98 MG/DL
NRBC BLD-RTO: 0 /100 WBC
PLATELET # BLD AUTO: 205 K/UL (ref 150–350)
PMV BLD AUTO: 9.6 FL (ref 9.2–12.9)
POTASSIUM SERPL-SCNC: 4.1 MMOL/L (ref 3.5–5.1)
PROT SERPL-MCNC: 7.1 G/DL (ref 6–8.4)
RBC # BLD AUTO: 4.01 M/UL (ref 4.6–6.2)
SODIUM SERPL-SCNC: 143 MMOL/L (ref 136–145)
TRIGL SERPL-MCNC: 39 MG/DL (ref 30–150)
WBC # BLD AUTO: 7.48 K/UL (ref 3.9–12.7)

## 2019-05-31 PROCEDURE — 99999 PR PBB SHADOW E&M-EST. PATIENT-LVL III: CPT | Mod: PBBFAC,HCNC,, | Performed by: FAMILY MEDICINE

## 2019-05-31 PROCEDURE — 99999 PR PBB SHADOW E&M-EST. PATIENT-LVL III: ICD-10-PCS | Mod: PBBFAC,HCNC,, | Performed by: FAMILY MEDICINE

## 2019-05-31 PROCEDURE — 3079F PR MOST RECENT DIASTOLIC BLOOD PRESSURE 80-89 MM HG: ICD-10-PCS | Mod: HCNC,CPTII,S$GLB, | Performed by: FAMILY MEDICINE

## 2019-05-31 PROCEDURE — 3079F DIAST BP 80-89 MM HG: CPT | Mod: HCNC,CPTII,S$GLB, | Performed by: FAMILY MEDICINE

## 2019-05-31 PROCEDURE — 85025 COMPLETE CBC W/AUTO DIFF WBC: CPT | Mod: HCNC

## 2019-05-31 PROCEDURE — 36415 COLL VENOUS BLD VENIPUNCTURE: CPT | Mod: HCNC

## 2019-05-31 PROCEDURE — 3074F PR MOST RECENT SYSTOLIC BLOOD PRESSURE < 130 MM HG: ICD-10-PCS | Mod: HCNC,CPTII,S$GLB, | Performed by: FAMILY MEDICINE

## 2019-05-31 PROCEDURE — 3074F SYST BP LT 130 MM HG: CPT | Mod: HCNC,CPTII,S$GLB, | Performed by: FAMILY MEDICINE

## 2019-05-31 PROCEDURE — 80061 LIPID PANEL: CPT | Mod: HCNC

## 2019-05-31 PROCEDURE — 80053 COMPREHEN METABOLIC PANEL: CPT | Mod: HCNC

## 2019-05-31 PROCEDURE — 99397 PR PREVENTIVE VISIT,EST,65 & OVER: ICD-10-PCS | Mod: HCNC,S$GLB,, | Performed by: FAMILY MEDICINE

## 2019-05-31 PROCEDURE — 99397 PER PM REEVAL EST PAT 65+ YR: CPT | Mod: HCNC,S$GLB,, | Performed by: FAMILY MEDICINE

## 2019-05-31 PROCEDURE — 84153 ASSAY OF PSA TOTAL: CPT | Mod: HCNC

## 2019-05-31 NOTE — TELEPHONE ENCOUNTER
----- Message from Suleiman Garner sent at 5/31/2019  3:52 PM CDT -----  Contact: [facility] Peer to Peer Department  She is calling in regards to getting a peer to peer for the patient and the deadline is on the close of business on Monday 06/03/19, please advise [phone]

## 2019-05-31 NOTE — PROGRESS NOTES
Subjective:       Patient ID: Lexa Hammond is a 83 y.o. male.    Chief Complaint: Annual Exam    Annual exam:      Pt is a 83 year old who is her for general annual exam. Pt is up-to-date on his wellness.    Review of Systems   Constitutional: Negative.    HENT: Negative.    Respiratory: Negative.    Cardiovascular: Negative.    Gastrointestinal: Negative.    Skin: Negative.    Neurological: Negative.    Psychiatric/Behavioral: Negative.        Objective:      Physical Exam   Constitutional: He is oriented to person, place, and time. He appears well-developed and well-nourished.   Cardiovascular: Normal rate and regular rhythm. Exam reveals no friction rub.   No murmur heard.  Pulmonary/Chest: Effort normal and breath sounds normal. No stridor. He has no wheezes.   Abdominal: Soft. Bowel sounds are normal. He exhibits no mass. There is no guarding.   Neurological: He is alert and oriented to person, place, and time.   Skin: Skin is warm and dry.       Assessment:       1. Annual physical exam        Plan:       Annual physical exam  Comments:  Pt is over all healthy and doing well. Pt is recoop for rehab.

## 2019-06-03 ENCOUNTER — TELEPHONE (OUTPATIENT)
Dept: INTERNAL MEDICINE | Facility: CLINIC | Age: 84
End: 2019-06-03

## 2019-06-03 NOTE — TELEPHONE ENCOUNTER
----- Message from Oanh Jorge sent at 6/3/2019  8:40 AM CDT -----  Contact: Alyx/Pamela  Type:  Patient Returning Call    Who Called: Alyx  Who Left Message for Patient: She didn't catch the name of caller  Does the patient know what this is regarding?: Home Health  Would the patient rather a call back or a response via [facility]? Call back   Best Call Back Number: Please call her at [phone]  Additional Information: n/a

## 2019-06-03 NOTE — TELEPHONE ENCOUNTER
Acute illness visit note    Impression:   · Viral URI with cough, probably early pneumonitis    Plan:   · Observation. Fluids. Appropriate dose of Tylenol reviewed.    Follow-up:   No Follow-up on file.    Order Details:  Orders Placed This Encounter   • PULSE OXIMETRY SINGLE   • XR Chest PA and Lateral     Order Specific Question:   Should Patient Return To MD?     Answer:   Yes     Order Specific Question:   Reason for exam?     Answer:   Cough       Associated diagnoses for this encounter:  1. Viral URI with cough    2. Cough       _____________________________________________________________    Chief Complaint   Patient presents with   • Cough     x 2 days   • Fever     intermittently, high temp: 101   .    HPI: Renate Rosales is a 4 year old female who presents for evaluation of fever, cough, and listlessness. The patient became ill on December 30 with fever and light cough. Cough increased over the weekend. Fever comes and goes. The patient has been listless with decreased appetite, although she takes fluids okay.    The patient has not had:vomiting and diarrhea    Ill contacts at home:No one else is sick at home    The problem list was reviewed and updated as follows:  Patient Active Problem List    Diagnosis Date Noted   • Routine child health maintenance 2014     Priority: Low       ALLERGIES:  No Known Allergies  Medications were reviewed at the time of the encounter.    Physical Exam  Vitals:  Visit Vitals   • Pulse 140   • Temp 100.6 °F (38.1 °C) (Temporal Artery)   • Ht 3' 1.5\" (0.953 m)   • Wt 12.8 kg   • SpO2 94%   • BMI 14.1 kg/m2   ·   · Gen:   The patient is Listless, in no acute distress.  · Eyes:   Conjunctivae clear bilaterally  · Ears:   TMs Normal bilaterally  · Ear canals:  Normal bilaterally  · Oropharynx:  Normal  · Neck:   Supple  · Lungs:   Clear to auscultation   Respiratory effort normal  · Heart:   Regular rate and rhythm without murmur  Chest x-ray negative for obvious infiltrate  Per Alyx with Pamela, [facility]  is requesting an additional 9 PT visits. Of the 9 requested, 5 have been approved. The additional 4 visits remaining unapproved visits would need a [id number] review.    Per Dr. Reyes, the 5 approved visits are sufficient. Provided this information to Alyx. She provided verbal call back of Dr. Reyes's agreement with 5 approved visits. Alyx thanked me and ended call.   to my reading  Oxygen saturations 94% on room air

## 2019-06-28 ENCOUNTER — TELEPHONE (OUTPATIENT)
Dept: CARDIOLOGY | Facility: CLINIC | Age: 84
End: 2019-06-28

## 2019-06-28 NOTE — TELEPHONE ENCOUNTER
Spoke with wife, Lisa about Losartan recall.  States that they didn't receive notice from pharmacy.  Advised her that to notify office should they receive notice.  Lisa verbalized understanding.

## 2019-07-02 ENCOUNTER — TELEPHONE (OUTPATIENT)
Dept: CARDIOLOGY | Facility: CLINIC | Age: 84
End: 2019-07-02

## 2019-07-02 ENCOUNTER — PATIENT MESSAGE (OUTPATIENT)
Dept: ADMINISTRATIVE | Facility: OTHER | Age: 84
End: 2019-07-02

## 2019-07-02 NOTE — TELEPHONE ENCOUNTER
Attempted to contact pt regarding r/s her appointments in Cardiology that are scheduled for 7/15/19 without success.  Email was sent.

## 2019-07-15 DIAGNOSIS — I10 ESSENTIAL HYPERTENSION: Primary | ICD-10-CM

## 2019-07-18 ENCOUNTER — CLINICAL SUPPORT (OUTPATIENT)
Dept: CARDIOLOGY | Facility: CLINIC | Age: 84
End: 2019-07-18
Attending: INTERNAL MEDICINE
Payer: MEDICARE

## 2019-07-18 ENCOUNTER — LAB VISIT (OUTPATIENT)
Dept: LAB | Facility: HOSPITAL | Age: 84
End: 2019-07-18
Attending: INTERNAL MEDICINE
Payer: MEDICARE

## 2019-07-18 DIAGNOSIS — I10 ESSENTIAL HYPERTENSION: Chronic | ICD-10-CM

## 2019-07-18 DIAGNOSIS — E78.2 MIXED HYPERLIPIDEMIA: Chronic | ICD-10-CM

## 2019-07-18 DIAGNOSIS — I65.23 BILATERAL CAROTID ARTERY STENOSIS: Chronic | ICD-10-CM

## 2019-07-18 DIAGNOSIS — I25.10 CORONARY ARTERY DISEASE DUE TO LIPID RICH PLAQUE: Chronic | ICD-10-CM

## 2019-07-18 DIAGNOSIS — I36.1 NON-RHEUMATIC TRICUSPID VALVE INSUFFICIENCY: ICD-10-CM

## 2019-07-18 DIAGNOSIS — Z79.01 CHRONIC ANTICOAGULATION: Chronic | ICD-10-CM

## 2019-07-18 DIAGNOSIS — I25.9 CHRONIC ISCHEMIC HEART DISEASE: ICD-10-CM

## 2019-07-18 DIAGNOSIS — I49.3 PVC'S (PREMATURE VENTRICULAR CONTRACTIONS): ICD-10-CM

## 2019-07-18 DIAGNOSIS — I27.20 PULMONARY HYPERTENSION: ICD-10-CM

## 2019-07-18 DIAGNOSIS — I34.0 NON-RHEUMATIC MITRAL REGURGITATION: ICD-10-CM

## 2019-07-18 DIAGNOSIS — I25.83 CORONARY ARTERY DISEASE DUE TO LIPID RICH PLAQUE: Chronic | ICD-10-CM

## 2019-07-18 LAB
ALBUMIN SERPL BCP-MCNC: 3.7 G/DL (ref 3.5–5.2)
ALP SERPL-CCNC: 95 U/L (ref 55–135)
ALT SERPL W/O P-5'-P-CCNC: 15 U/L (ref 10–44)
ANION GAP SERPL CALC-SCNC: 7 MMOL/L (ref 8–16)
AST SERPL-CCNC: 22 U/L (ref 10–40)
BASOPHILS # BLD AUTO: 0.05 K/UL (ref 0–0.2)
BASOPHILS NFR BLD: 0.7 % (ref 0–1.9)
BILIRUB SERPL-MCNC: 0.8 MG/DL (ref 0.1–1)
BUN SERPL-MCNC: 25 MG/DL (ref 8–23)
CALCIUM SERPL-MCNC: 9.6 MG/DL (ref 8.7–10.5)
CHLORIDE SERPL-SCNC: 104 MMOL/L (ref 95–110)
CHOLEST SERPL-MCNC: 156 MG/DL (ref 120–199)
CHOLEST/HDLC SERPL: 2.6 {RATIO} (ref 2–5)
CO2 SERPL-SCNC: 29 MMOL/L (ref 23–29)
CREAT SERPL-MCNC: 0.9 MG/DL (ref 0.5–1.4)
DIFFERENTIAL METHOD: ABNORMAL
EOSINOPHIL # BLD AUTO: 0.2 K/UL (ref 0–0.5)
EOSINOPHIL NFR BLD: 2.6 % (ref 0–8)
ERYTHROCYTE [DISTWIDTH] IN BLOOD BY AUTOMATED COUNT: 13.9 % (ref 11.5–14.5)
EST. GFR  (AFRICAN AMERICAN): >60 ML/MIN/1.73 M^2
EST. GFR  (NON AFRICAN AMERICAN): >60 ML/MIN/1.73 M^2
ESTIMATED PA SYSTOLIC PRESSURE: 40.7
GLUCOSE SERPL-MCNC: 86 MG/DL (ref 70–110)
HCT VFR BLD AUTO: 39.9 % (ref 40–54)
HDLC SERPL-MCNC: 60 MG/DL (ref 40–75)
HDLC SERPL: 38.5 % (ref 20–50)
HGB BLD-MCNC: 12.8 G/DL (ref 14–18)
IMM GRANULOCYTES # BLD AUTO: 0.02 K/UL (ref 0–0.04)
IMM GRANULOCYTES NFR BLD AUTO: 0.3 % (ref 0–0.5)
INTERNAL CAROTID STENOSIS: ABNORMAL
LDLC SERPL CALC-MCNC: 87.6 MG/DL (ref 63–159)
LYMPHOCYTES # BLD AUTO: 2.3 K/UL (ref 1–4.8)
LYMPHOCYTES NFR BLD: 33.7 % (ref 18–48)
MCH RBC QN AUTO: 29.9 PG (ref 27–31)
MCHC RBC AUTO-ENTMCNC: 32.1 G/DL (ref 32–36)
MCV RBC AUTO: 93 FL (ref 82–98)
MITRAL VALVE REGURGITATION: ABNORMAL
MONOCYTES # BLD AUTO: 0.7 K/UL (ref 0.3–1)
MONOCYTES NFR BLD: 10.2 % (ref 4–15)
NEUTROPHILS # BLD AUTO: 3.6 K/UL (ref 1.8–7.7)
NEUTROPHILS NFR BLD: 52.5 % (ref 38–73)
NONHDLC SERPL-MCNC: 96 MG/DL
NRBC BLD-RTO: 0 /100 WBC
PLATELET # BLD AUTO: 181 K/UL (ref 150–350)
PMV BLD AUTO: 9.9 FL (ref 9.2–12.9)
POTASSIUM SERPL-SCNC: 4.2 MMOL/L (ref 3.5–5.1)
PROT SERPL-MCNC: 6.8 G/DL (ref 6–8.4)
RBC # BLD AUTO: 4.28 M/UL (ref 4.6–6.2)
RETIRED EF AND QEF - SEE NOTES: 60 (ref 55–65)
SODIUM SERPL-SCNC: 140 MMOL/L (ref 136–145)
TRICUSPID VALVE REGURGITATION: ABNORMAL
TRIGL SERPL-MCNC: 42 MG/DL (ref 30–150)
WBC # BLD AUTO: 6.83 K/UL (ref 3.9–12.7)

## 2019-07-18 PROCEDURE — 80061 LIPID PANEL: CPT | Mod: HCNC

## 2019-07-18 PROCEDURE — 93880 CAR US DOPPLER CAROTID BILATERAL: ICD-10-PCS | Mod: HCNC,S$GLB,, | Performed by: INTERNAL MEDICINE

## 2019-07-18 PROCEDURE — 36415 COLL VENOUS BLD VENIPUNCTURE: CPT | Mod: HCNC

## 2019-07-18 PROCEDURE — 93306 TTE W/DOPPLER COMPLETE: CPT | Mod: HCNC,S$GLB,, | Performed by: INTERNAL MEDICINE

## 2019-07-18 PROCEDURE — 93306 2D ECHO WITH COLOR FLOW DOPPLER: ICD-10-PCS | Mod: HCNC,S$GLB,, | Performed by: INTERNAL MEDICINE

## 2019-07-18 PROCEDURE — 80053 COMPREHEN METABOLIC PANEL: CPT | Mod: HCNC

## 2019-07-18 PROCEDURE — 85025 COMPLETE CBC W/AUTO DIFF WBC: CPT | Mod: HCNC

## 2019-07-18 PROCEDURE — 93880 EXTRACRANIAL BILAT STUDY: CPT | Mod: HCNC,S$GLB,, | Performed by: INTERNAL MEDICINE

## 2019-07-23 ENCOUNTER — OFFICE VISIT (OUTPATIENT)
Dept: CARDIOLOGY | Facility: CLINIC | Age: 84
End: 2019-07-23
Payer: MEDICARE

## 2019-07-23 ENCOUNTER — CLINICAL SUPPORT (OUTPATIENT)
Dept: CARDIOLOGY | Facility: CLINIC | Age: 84
End: 2019-07-23
Payer: MEDICARE

## 2019-07-23 VITALS
HEIGHT: 68 IN | WEIGHT: 147.5 LBS | SYSTOLIC BLOOD PRESSURE: 132 MMHG | BODY MASS INDEX: 22.35 KG/M2 | HEART RATE: 82 BPM | DIASTOLIC BLOOD PRESSURE: 82 MMHG

## 2019-07-23 DIAGNOSIS — I49.3 PVC'S (PREMATURE VENTRICULAR CONTRACTIONS): ICD-10-CM

## 2019-07-23 DIAGNOSIS — I27.20 PULMONARY HYPERTENSION: ICD-10-CM

## 2019-07-23 DIAGNOSIS — I25.9 CHRONIC ISCHEMIC HEART DISEASE: ICD-10-CM

## 2019-07-23 DIAGNOSIS — I10 ESSENTIAL HYPERTENSION: Chronic | ICD-10-CM

## 2019-07-23 DIAGNOSIS — I36.1 NON-RHEUMATIC TRICUSPID VALVE INSUFFICIENCY: ICD-10-CM

## 2019-07-23 DIAGNOSIS — I70.0 AORTO-ILIAC ATHEROSCLEROSIS: Chronic | ICD-10-CM

## 2019-07-23 DIAGNOSIS — G47.33 OSA (OBSTRUCTIVE SLEEP APNEA): Chronic | ICD-10-CM

## 2019-07-23 DIAGNOSIS — Z79.01 CHRONIC ANTICOAGULATION: Chronic | ICD-10-CM

## 2019-07-23 DIAGNOSIS — I10 ESSENTIAL HYPERTENSION: ICD-10-CM

## 2019-07-23 DIAGNOSIS — I70.8 AORTO-ILIAC ATHEROSCLEROSIS: Chronic | ICD-10-CM

## 2019-07-23 DIAGNOSIS — Z86.73 HISTORY OF CVA (CEREBROVASCULAR ACCIDENT): ICD-10-CM

## 2019-07-23 DIAGNOSIS — I25.118 CORONARY ARTERY DISEASE OF NATIVE HEART WITH STABLE ANGINA PECTORIS, UNSPECIFIED VESSEL OR LESION TYPE: Chronic | ICD-10-CM

## 2019-07-23 DIAGNOSIS — E78.2 MIXED HYPERLIPIDEMIA: Chronic | ICD-10-CM

## 2019-07-23 DIAGNOSIS — I48.20 ATRIAL FIBRILLATION, CHRONIC: Chronic | ICD-10-CM

## 2019-07-23 DIAGNOSIS — R94.31 ABNORMAL ECG: Primary | ICD-10-CM

## 2019-07-23 DIAGNOSIS — I65.23 BILATERAL CAROTID ARTERY STENOSIS: Chronic | ICD-10-CM

## 2019-07-23 DIAGNOSIS — I34.0 NON-RHEUMATIC MITRAL REGURGITATION: ICD-10-CM

## 2019-07-23 PROCEDURE — 99499 RISK ADDL DX/OHS AUDIT: ICD-10-PCS | Mod: HCNC,S$GLB,, | Performed by: INTERNAL MEDICINE

## 2019-07-23 PROCEDURE — 99999 PR PBB SHADOW E&M-EST. PATIENT-LVL III: ICD-10-PCS | Mod: PBBFAC,HCNC,, | Performed by: INTERNAL MEDICINE

## 2019-07-23 PROCEDURE — 3079F DIAST BP 80-89 MM HG: CPT | Mod: HCNC,CPTII,S$GLB, | Performed by: INTERNAL MEDICINE

## 2019-07-23 PROCEDURE — 99214 OFFICE O/P EST MOD 30 MIN: CPT | Mod: HCNC,S$GLB,, | Performed by: INTERNAL MEDICINE

## 2019-07-23 PROCEDURE — 1101F PR PT FALLS ASSESS DOC 0-1 FALLS W/OUT INJ PAST YR: ICD-10-PCS | Mod: HCNC,CPTII,S$GLB, | Performed by: INTERNAL MEDICINE

## 2019-07-23 PROCEDURE — 3075F SYST BP GE 130 - 139MM HG: CPT | Mod: HCNC,CPTII,S$GLB, | Performed by: INTERNAL MEDICINE

## 2019-07-23 PROCEDURE — 1101F PT FALLS ASSESS-DOCD LE1/YR: CPT | Mod: HCNC,CPTII,S$GLB, | Performed by: INTERNAL MEDICINE

## 2019-07-23 PROCEDURE — 99999 PR PBB SHADOW E&M-EST. PATIENT-LVL III: CPT | Mod: PBBFAC,HCNC,, | Performed by: INTERNAL MEDICINE

## 2019-07-23 PROCEDURE — 99499 UNLISTED E&M SERVICE: CPT | Mod: HCNC,S$GLB,, | Performed by: INTERNAL MEDICINE

## 2019-07-23 PROCEDURE — 3079F PR MOST RECENT DIASTOLIC BLOOD PRESSURE 80-89 MM HG: ICD-10-PCS | Mod: HCNC,CPTII,S$GLB, | Performed by: INTERNAL MEDICINE

## 2019-07-23 PROCEDURE — 3075F PR MOST RECENT SYSTOLIC BLOOD PRESS GE 130-139MM HG: ICD-10-PCS | Mod: HCNC,CPTII,S$GLB, | Performed by: INTERNAL MEDICINE

## 2019-07-23 PROCEDURE — 93000 EKG 12-LEAD: ICD-10-PCS | Mod: HCNC,S$GLB,, | Performed by: INTERNAL MEDICINE

## 2019-07-23 PROCEDURE — 99214 PR OFFICE/OUTPT VISIT, EST, LEVL IV, 30-39 MIN: ICD-10-PCS | Mod: HCNC,S$GLB,, | Performed by: INTERNAL MEDICINE

## 2019-07-23 PROCEDURE — 93000 ELECTROCARDIOGRAM COMPLETE: CPT | Mod: HCNC,S$GLB,, | Performed by: INTERNAL MEDICINE

## 2019-07-23 RX ORDER — LOSARTAN POTASSIUM 50 MG/1
50 TABLET ORAL DAILY
Qty: 90 TABLET | Refills: 3 | Status: SHIPPED | OUTPATIENT
Start: 2019-07-23 | End: 2019-12-31 | Stop reason: SDUPTHER

## 2019-07-23 RX ORDER — ACETAMINOPHEN 500 MG
TABLET ORAL
COMMUNITY
Start: 2019-07-01 | End: 2020-07-22

## 2019-07-23 NOTE — PROGRESS NOTES
Subjective:    Patient ID:  Lexa Hammond is a 83 y.o. male who presents for evaluation of Atrial Fibrillation; Mixed hyperlipidemia; Hypertension; Risk Factor Management For Atherosclerosis; Coronary Artery Disease; and Carotid Artery Disease      HPI Mr. Hammond returns for f/u.   His current medical conditions include HTN, LVH, CAD, LAE, chronic atrial fibrillation, PHTN, MR, TR, PAD, carotid artery disease. Nonsmoker.  Past hx pertinent for following:  S/p CVA 2002. He had a R CEA by Dr. Angeles 12/09.   S/p  LHC 7/11 showed calcified CAD. Medical mgt recommended.   He did not tolerate Ranexa (rash). He has been intolerant of many meds to include many statins. Did not tolerate Imdur with headaches. He also stopped Metoprolol and Pravastatin for dizziness.   Negative stress MPI June 2016.  Echo 10/17 normal EF, mod MR, mod TR, PHTN 47 mmHg.  Now here.  Carotid u/s shows L ICA stenosis 70% now.  Asx.  No TIA/CVA sxs.  S/p femur fx surgery since I last saw him, successful. No cv complications.  HTN controlled. Losartan increased while hospitalized at [facility].  Lipids are stable.  CAD stable. No active anginal sxs on current meds.  ecg today shows a fib with controlled VR, LAE, possible old septal infarct.  Echo 7/19 normal EF, LAE, LVH, mild-mod TR/MR, mild PHTN, mild enlarged ascending aorta.  No abnl bleeding on NOAC.  No claudication sxs.      Current Outpatient Medications:     amLODIPine (NORVASC) 5 MG tablet, TAKE 1 AND 1/2 TABLETS EVERY DAY, Disp: 135 tablet, Rfl: 3    aspirin (ECOTRIN) 81 MG EC tablet, Take 81 mg by mouth once daily., Disp: , Rfl:     blood pressure monitor Kit, , Disp: , Rfl:     ibandronate (BONIVA) 150 mg tablet, TAKE 1 TABLET BY MOUTH EVERY 30 DAYS AS DIRECTED, Disp: 3 tablet, Rfl: 1    incontinence pad, liner, disp Pads, , Disp: , Rfl:     losartan (COZAAR) 50 MG tablet, Take 1 tablet (50 mg total) by mouth once daily., Disp: 90 tablet, Rfl: 3    oxybutynin (DITROPAN) 5 MG  "Tab, Take 1 tablet (5 mg total) by mouth 3 (three) times daily., Disp: 90 tablet, Rfl: 11    rivaroxaban (XARELTO) 20 mg Tab, Take 1 tablet (20 mg total) by mouth once daily., Disp: 30 tablet, Rfl: 12    tamsulosin (FLOMAX) 0.4 mg Cap, Take 1 capsule (0.4 mg total) by mouth once daily., Disp: 90 capsule, Rfl: 3    betamethasone valerate 0.1% (VALISONE) 0.1 % Oint, Apply topically 2 (two) times daily., Disp: 45 g, Rfl: 0    co-enzyme Q-10 30 mg capsule, Take 30 mg by mouth once daily. , Disp: , Rfl:     multivitamin with minerals tablet, Take 1 tablet by mouth once daily., Disp: , Rfl:     polyethylene glycol (GLYCOLAX) 17 gram PwPk, Take 17 g by mouth once daily., Disp: 10 each, Rfl: 0    VITAMIN D3 5,000 unit Tab, 5,000 Units once daily. , Disp: , Rfl:     Review of Systems   Constitution: Negative.   HENT: Negative.    Eyes: Negative.    Cardiovascular: Negative.    Respiratory: Negative.    Endocrine: Negative.    Hematologic/Lymphatic: Negative.    Skin: Negative.    Musculoskeletal: Positive for arthritis and joint pain.   Gastrointestinal: Negative.    Genitourinary: Negative.    Neurological: Negative.    Psychiatric/Behavioral: Negative.    Allergic/Immunologic: Negative.        /82 (BP Location: Right arm, Patient Position: Sitting)   Pulse 82   Ht 5' 8" (1.727 m)   Wt 66.9 kg (147 lb 7.8 oz)   BMI 22.43 kg/m²   Wt Readings from Last 3 Encounters:   07/23/19 66.9 kg (147 lb 7.8 oz)   05/31/19 65.3 kg (143 lb 15.4 oz)   05/22/19 65.1 kg (143 lb 8.3 oz)     Temp Readings from Last 3 Encounters:   05/31/19 97.4 °F (36.3 °C) (Tympanic)   03/28/19 97.1 °F (36.2 °C) (Tympanic)   05/30/18 97.7 °F (36.5 °C) (Tympanic)     BP Readings from Last 3 Encounters:   07/23/19 132/82   05/31/19 128/86   05/22/19 122/84     Pulse Readings from Last 3 Encounters:   07/23/19 82   05/31/19 87   05/22/19 87          Objective:    Physical Exam   Constitutional: He is oriented to person, place, and time. He " appears well-developed and well-nourished.   HENT:   Head: Normocephalic.   Neck: Normal range of motion. Neck supple. Normal carotid pulses, no hepatojugular reflux and no JVD present. Carotid bruit is not present. No thyromegaly present.   Cardiovascular: Normal rate, S1 normal and S2 normal. An irregularly irregular rhythm present. PMI is not displaced. Exam reveals no S3, no S4, no distant heart sounds, no friction rub, no midsystolic click and no opening snap.   No murmur heard.  Pulses:       Radial pulses are 2+ on the right side, and 2+ on the left side.   Pulmonary/Chest: Effort normal and breath sounds normal. He has no wheezes. He has no rales.   Abdominal: Soft. Bowel sounds are normal. He exhibits no distension, no abdominal bruit, no ascites and no mass. There is no tenderness.   Musculoskeletal: He exhibits no edema.   Neurological: He is alert and oriented to person, place, and time.   Skin: Skin is warm.   Psychiatric: He has a normal mood and affect. His behavior is normal.   Nursing note and vitals reviewed.        I have reviewed all pertinent labs and cardiac studies.      Chemistry        Component Value Date/Time     07/18/2019 0811    K 4.2 07/18/2019 0811     07/18/2019 0811    CO2 29 07/18/2019 0811    BUN 25 (H) 07/18/2019 0811    CREATININE 0.9 07/18/2019 0811    GLU 86 07/18/2019 0811        Component Value Date/Time    CALCIUM 9.6 07/18/2019 0811    ALKPHOS 95 07/18/2019 0811    AST 22 07/18/2019 0811    ALT 15 07/18/2019 0811    BILITOT 0.8 07/18/2019 0811    ESTGFRAFRICA >60.0 07/18/2019 0811    EGFRNONAA >60.0 07/18/2019 0811        Lab Results   Component Value Date    WBC 6.83 07/18/2019    HGB 12.8 (L) 07/18/2019    HCT 39.9 (L) 07/18/2019    MCV 93 07/18/2019     07/18/2019     Lab Results   Component Value Date    HGBA1C 5.9 10/18/2012     Lab Results   Component Value Date    CHOL 156 07/18/2019    CHOL 159 05/31/2019    CHOL 188 04/09/2018     Lab Results    Component Value Date    HDL 60 07/18/2019    HDL 61 05/31/2019    HDL 72 04/09/2018     Lab Results   Component Value Date    LDLCALC 87.6 07/18/2019    LDLCALC 90.2 05/31/2019    LDLCALC 104.6 04/09/2018     Lab Results   Component Value Date    TRIG 42 07/18/2019    TRIG 39 05/31/2019    TRIG 57 04/09/2018     Lab Results   Component Value Date    CHOLHDL 38.5 07/18/2019    CHOLHDL 38.4 05/31/2019    CHOLHDL 38.3 04/09/2018       TEST DESCRIPTION     RIGHT  The right Proximal Common Carotid Artery is visualized.   The right carotid bulb artery is visualized.   The right Distal Internal Carotid Artery has 20 - 39% stenosis.   There is acceleration in the right external carotid artery, associated with extensive and homogeneous plaques.   There is acceleration in the right vertebral artery, associated with anterograde flow.   The right ICA/CCA ratio is: 1.56    LEFT  There is acceleration in the left Proximal Common Carotid Artery.   The left carotid bulb artery is visualized.   The left Proximal Internal Carotid Artery has 70 - 79% stenosis, associated with heterogeneous plaque.   There is acceleration in the left external carotid artery.   The left vertebral artery is visualized, associated with anterograde flow.   The left ICA/CCA ratio is: 2.35      CONCLUSIONS   There is 20 - 39% right Internal Carotid stenosis.  There is 70 - 79% left Internal Carotid stenosis.          This document has been electronically    SIGNED BY: Joel Colunga MD On: 07/18/2019 18:21        Date of Procedure: 07/18/2019        TEST DESCRIPTION   Technical Quality: This is a technically adequate study.     Aorta: The aortic root is normal in size, measuring 3.6 cm at sinotubular junction and 3.5 cm at Sinuses of Valsalva. The proximal ascending aorta is mildly enlarged, measuring 3.8 cm across.     Left Atrium: The left atrial volume index is severely enlarged, measuring 92.32 cc/m2.     Left Ventricle: The left ventricle is normal in  size, with an end-diastolic diameter of 3.9 cm, and an end-systolic diameter of 2.7 cm. LV wall thickness is normal, with the septum and the posterior wall each measuring 1.4 cm across. Relative wall   thickness was increased at 0.72, and the LV mass index was increased at 134.2 g/m2 consistent with concentric left ventricular hypertrophy. There are no regional wall motion abnormalities. Left ventricular systolic function appears normal. Visually   estimated ejection fraction is 60-65%. The LV Doppler derived stroke volume equals 65.0 ccs.     Diastolic indices: E wave velocity 1.1 m/s, E/A ratio 2.2,  msec., E/e' ratio(avg) 9. Diastolic function is indeterminate.     Right Atrium: The right atrium is moderately enlarged, measuring 6.2 cm in length and 4.6 cm in width in the apical view.     Right Ventricle: The right ventricle is normal in size measuring 3.3 cm at the base in the apical right ventricle-focused view. Global right ventricular systolic function appears normal. The estimated PA systolic pressure is 41 mmHg.     Aortic Valve:  The aortic valve is normal in structure. The aortic valve is tri-leaflet in structure.     Mitral Valve:  The mitral valve is normal in structure. There is mild to moderate mitral regurgitation.     Tricuspid Valve:  The tricuspid valve is normal in structure. There is mild to moderate tricuspid regurgitation.     Pulmonary Valve:  The pulmonic valve is normal in structure. There is mild pulmonic regurgitation.     IVC: IVC is normal in size and collapses > 50% with a sniff, suggesting normal right atrial pressure of 3 mmHg.     Intracavitary: There is no evidence of pericardial effusion, intracavity mass, thrombi, or vegetation.         CONCLUSIONS     1 - Mildly enlarged ascending aorta.     2 - Biatrial enlargement.     3 - Concentric hypertrophy.     4 - No wall motion abnormalities.     5 - Normal left ventricular systolic function (EF 60-65%).     6 - Indeterminate LV  diastolic function.     7 - Normal right ventricular systolic function .     8 - Pulmonary hypertension. The estimated PA systolic pressure is 41 mmHg.     9 - Mild to moderate mitral regurgitation.     10 - Mild to moderate tricuspid regurgitation.             This document has been electronically    SIGNED BY: Joel Colunga MD On: 07/18/2019 13:10    Assessment:       1. Abnormal ECG    2. Aorto-iliac atherosclerosis    3. Atrial fibrillation, chronic    4. Non-rheumatic tricuspid valve insufficiency    5. PVC's (premature ventricular contractions)    6. Pulmonary hypertension    7. ZACK (obstructive sleep apnea)    8. Non-rheumatic mitral regurgitation    9. Mixed hyperlipidemia    10. Essential hypertension    11. History of CVA (cerebrovascular accident)    12. Coronary artery disease of native heart with stable angina pectoris, unspecified vessel or lesion type    13. Chronic ischemic heart disease    14. Chronic anticoagulation    15. Bilateral carotid artery stenosis         Plan:             Continue medical tx for CV conditions.  Reviewed recent tests and above medical conditions with pt in detail and formulated tx plan.  Check carotid u/s every 6 months.  Medical tx for CAD.  CPAP not being used for h/o ZACK -- advised to use it.  Cardiac diet.  Fall precautions.  Continue OAC for a fib.  F/u 6 months with carotid u/s.

## 2019-08-02 DIAGNOSIS — I48.20 CHRONIC ATRIAL FIBRILLATION: ICD-10-CM

## 2019-08-02 DIAGNOSIS — I48.20 ATRIAL FIBRILLATION, CHRONIC: Chronic | ICD-10-CM

## 2019-08-12 DIAGNOSIS — I48.20 ATRIAL FIBRILLATION, CHRONIC: Chronic | ICD-10-CM

## 2019-08-12 DIAGNOSIS — I48.20 CHRONIC ATRIAL FIBRILLATION: ICD-10-CM

## 2019-08-16 ENCOUNTER — TELEPHONE (OUTPATIENT)
Dept: INTERNAL MEDICINE | Facility: CLINIC | Age: 84
End: 2019-08-16

## 2019-08-16 NOTE — TELEPHONE ENCOUNTER
Informed Татьяна about [facility] and provided her their fax number also ([phone]) for any signed orders. She thanked me and ended call.

## 2019-08-16 NOTE — TELEPHONE ENCOUNTER
----- Message from Suleiman Garner sent at 8/16/2019 11:10 AM CDT -----  Contact: [facility]   She is calling in regards to getting 4 outstanding orders signed by Dr. Reyes,please advise as soon as possible [phone]

## 2019-08-27 ENCOUNTER — OFFICE VISIT (OUTPATIENT)
Dept: INTERNAL MEDICINE | Facility: CLINIC | Age: 84
End: 2019-08-27
Payer: MEDICARE

## 2019-08-27 VITALS
OXYGEN SATURATION: 97 % | WEIGHT: 147.69 LBS | SYSTOLIC BLOOD PRESSURE: 174 MMHG | HEIGHT: 68 IN | HEART RATE: 78 BPM | DIASTOLIC BLOOD PRESSURE: 100 MMHG | TEMPERATURE: 97 F | BODY MASS INDEX: 22.38 KG/M2

## 2019-08-27 DIAGNOSIS — Z86.73 HISTORY OF CVA (CEREBROVASCULAR ACCIDENT): ICD-10-CM

## 2019-08-27 DIAGNOSIS — I48.20 ATRIAL FIBRILLATION, CHRONIC: Chronic | ICD-10-CM

## 2019-08-27 DIAGNOSIS — I10 ESSENTIAL HYPERTENSION: Primary | Chronic | ICD-10-CM

## 2019-08-27 PROCEDURE — 99214 PR OFFICE/OUTPT VISIT, EST, LEVL IV, 30-39 MIN: ICD-10-PCS | Mod: HCNC,S$GLB,, | Performed by: FAMILY MEDICINE

## 2019-08-27 PROCEDURE — 1101F PR PT FALLS ASSESS DOC 0-1 FALLS W/OUT INJ PAST YR: ICD-10-PCS | Mod: HCNC,CPTII,S$GLB, | Performed by: FAMILY MEDICINE

## 2019-08-27 PROCEDURE — 99999 PR PBB SHADOW E&M-EST. PATIENT-LVL III: CPT | Mod: PBBFAC,HCNC,, | Performed by: FAMILY MEDICINE

## 2019-08-27 PROCEDURE — 99214 OFFICE O/P EST MOD 30 MIN: CPT | Mod: HCNC,S$GLB,, | Performed by: FAMILY MEDICINE

## 2019-08-27 PROCEDURE — 1101F PT FALLS ASSESS-DOCD LE1/YR: CPT | Mod: HCNC,CPTII,S$GLB, | Performed by: FAMILY MEDICINE

## 2019-08-27 PROCEDURE — 3080F PR MOST RECENT DIASTOLIC BLOOD PRESSURE >= 90 MM HG: ICD-10-PCS | Mod: HCNC,CPTII,S$GLB, | Performed by: FAMILY MEDICINE

## 2019-08-27 PROCEDURE — 3077F SYST BP >= 140 MM HG: CPT | Mod: HCNC,CPTII,S$GLB, | Performed by: FAMILY MEDICINE

## 2019-08-27 PROCEDURE — 3080F DIAST BP >= 90 MM HG: CPT | Mod: HCNC,CPTII,S$GLB, | Performed by: FAMILY MEDICINE

## 2019-08-27 PROCEDURE — 3077F PR MOST RECENT SYSTOLIC BLOOD PRESSURE >= 140 MM HG: ICD-10-PCS | Mod: HCNC,CPTII,S$GLB, | Performed by: FAMILY MEDICINE

## 2019-08-27 PROCEDURE — 99999 PR PBB SHADOW E&M-EST. PATIENT-LVL III: ICD-10-PCS | Mod: PBBFAC,HCNC,, | Performed by: FAMILY MEDICINE

## 2019-08-27 RX ORDER — HYDRALAZINE HYDROCHLORIDE 25 MG/1
25 TABLET, FILM COATED ORAL 3 TIMES DAILY
Qty: 90 TABLET | Refills: 0 | Status: SHIPPED | OUTPATIENT
Start: 2019-08-27 | End: 2019-08-27 | Stop reason: SDUPTHER

## 2019-08-27 NOTE — PATIENT INSTRUCTIONS
Hydralazine:      Take 1 tab if top number is greater than 150 and or bottom is greater than 100. Repeat pressure in 2 hours. If still high can take another pill but always repeat pressure in 2 hours. Can do this up to 4 times.

## 2019-08-27 NOTE — PROGRESS NOTES
Subjective:       Patient ID: Lexa Hammond is a 84 y.o. male.    Chief Complaint: Hospital Follow Up    FU:      Pt is a 84 year old who is here with BP is elevated which is not characteristic for pt. Pt BP is today 170/100     Review of Systems   Constitutional: Negative.    Respiratory: Negative.    Cardiovascular: Negative.    Genitourinary: Negative.    Musculoskeletal: Negative.    Neurological: Negative.    Hematological: Negative.    Psychiatric/Behavioral: Negative.        Objective:      Physical Exam   Constitutional: He is oriented to person, place, and time. He appears well-developed and well-nourished.   Cardiovascular: Normal rate and regular rhythm.   Pulmonary/Chest: Effort normal and breath sounds normal. No stridor. He has no wheezes.   Abdominal: Soft. Bowel sounds are normal. There is no tenderness. There is no guarding.   Neurological: He is alert and oriented to person, place, and time.   Skin: Skin is warm and dry.       Assessment:       1. Essential hypertension    2. Atrial fibrillation, chronic    3. History of CVA (cerebrovascular accident)        Plan:       Essential hypertension  Comments:  Increase the BP to 10 mg and hydralazine 25 mg. 1 tab three times a day as needed    Atrial fibrillation, chronic  Comments:  Pt is rate controlled    History of CVA (cerebrovascular accident)  Comments:  Stable at this point but do not want BP to increase stroke risk    Other orders  -     hydrALAZINE (APRESOLINE) 25 MG tablet; Take 1 tablet (25 mg total) by mouth 3 (three) times daily.  Dispense: 90 tablet; Refill: 0

## 2019-08-28 RX ORDER — HYDRALAZINE HYDROCHLORIDE 25 MG/1
TABLET, FILM COATED ORAL
Qty: 270 TABLET | Refills: 1 | Status: SHIPPED | OUTPATIENT
Start: 2019-08-28 | End: 2020-07-08

## 2019-08-29 ENCOUNTER — OFFICE VISIT (OUTPATIENT)
Dept: OPHTHALMOLOGY | Facility: CLINIC | Age: 84
End: 2019-08-29
Payer: MEDICARE

## 2019-08-29 ENCOUNTER — TELEPHONE (OUTPATIENT)
Dept: OPHTHALMOLOGY | Facility: CLINIC | Age: 84
End: 2019-08-29

## 2019-08-29 DIAGNOSIS — Z96.1 PSEUDOPHAKIA OF BOTH EYES: ICD-10-CM

## 2019-08-29 DIAGNOSIS — H55.01 NYSTAGMUS, CONGENITAL: ICD-10-CM

## 2019-08-29 DIAGNOSIS — H52.7 REFRACTIVE ERROR: ICD-10-CM

## 2019-08-29 DIAGNOSIS — E70.319 OCULAR ALBINISM: Primary | ICD-10-CM

## 2019-08-29 PROCEDURE — 99499 UNLISTED E&M SERVICE: CPT | Mod: HCNC,S$GLB,, | Performed by: OPTOMETRIST

## 2019-08-29 PROCEDURE — 99499 RISK ADDL DX/OHS AUDIT: ICD-10-PCS | Mod: HCNC,S$GLB,, | Performed by: OPTOMETRIST

## 2019-08-29 PROCEDURE — 99999 PR PBB SHADOW E&M-EST. PATIENT-LVL I: CPT | Mod: PBBFAC,HCNC,, | Performed by: OPTOMETRIST

## 2019-08-29 PROCEDURE — 99999 PR PBB SHADOW E&M-EST. PATIENT-LVL I: ICD-10-PCS | Mod: PBBFAC,HCNC,, | Performed by: OPTOMETRIST

## 2019-08-29 PROCEDURE — 92014 PR EYE EXAM, EST PATIENT,COMPREHESV: ICD-10-PCS | Mod: HCNC,S$GLB,, | Performed by: OPTOMETRIST

## 2019-08-29 PROCEDURE — 92014 COMPRE OPH EXAM EST PT 1/>: CPT | Mod: HCNC,S$GLB,, | Performed by: OPTOMETRIST

## 2019-08-29 PROCEDURE — 92015 PR REFRACTION: ICD-10-PCS | Mod: HCNC,S$GLB,, | Performed by: OPTOMETRIST

## 2019-08-29 PROCEDURE — 92015 DETERMINE REFRACTIVE STATE: CPT | Mod: HCNC,S$GLB,, | Performed by: OPTOMETRIST

## 2019-08-29 NOTE — PROGRESS NOTES
HPI     No visual complaints   Last eye exam 03/19/2018 TRF.  Update glasses RX.    Last edited by Jenifer Hoang on 8/29/2019  8:16 AM. (History)            Assessment /Plan     For exam results, see Encounter Report.    Ocular albinism    Nystagmus, congenital    Pseudophakia of both eyes    Refractive error      Unchanged ocular health.    Dispense Final Rx for glasses.  RTC 1 year  Discussed above and answered questions.

## 2019-09-02 PROBLEM — Z00.00 ANNUAL PHYSICAL EXAM: Status: RESOLVED | Noted: 2017-05-09 | Resolved: 2019-09-02

## 2019-09-04 ENCOUNTER — TELEPHONE (OUTPATIENT)
Dept: INTERNAL MEDICINE | Facility: CLINIC | Age: 84
End: 2019-09-04

## 2019-09-04 VITALS — DIASTOLIC BLOOD PRESSURE: 80 MMHG | SYSTOLIC BLOOD PRESSURE: 135 MMHG

## 2019-09-04 RX ORDER — AMLODIPINE BESYLATE 10 MG/1
10 TABLET ORAL DAILY
Qty: 90 TABLET | Refills: 1 | Status: SHIPPED | OUTPATIENT
Start: 2019-09-04 | End: 2020-12-01

## 2019-09-04 NOTE — TELEPHONE ENCOUNTER
I s/w pt's wife informing her that Dr. Reyes sent in a new prescription for 10mg amlodipine, so pt will only have to take one tablet instead of two of the 5mg tabs. Pt's wife thanked me for the call and ended call. //BJ

## 2019-09-04 NOTE — TELEPHONE ENCOUNTER
----- Message from Ismael Peterson sent at 9/4/2019  9:34 AM CDT -----  Contact: Pt/Wife   Please give pt wife a call at .[phone] (home) regarding a update on the pt BP.

## 2019-09-04 NOTE — TELEPHONE ENCOUNTER
I returned pts wife call in regards to Pt's blood pressure. She states that Dr. Reyes informed them if blood pressure becomes stable while taking two amlodipine tablets, then they do not have to come in to appointment. She states after pt takes his amlodipine it was 135/80. I informed pt's wife I would like Dr. Reyes know. //BJ

## 2019-09-27 ENCOUNTER — OFFICE VISIT (OUTPATIENT)
Dept: INTERNAL MEDICINE | Facility: CLINIC | Age: 84
End: 2019-09-27
Payer: MEDICARE

## 2019-09-27 VITALS
TEMPERATURE: 97 F | BODY MASS INDEX: 23.08 KG/M2 | OXYGEN SATURATION: 99 % | WEIGHT: 152.31 LBS | SYSTOLIC BLOOD PRESSURE: 132 MMHG | HEIGHT: 68 IN | DIASTOLIC BLOOD PRESSURE: 76 MMHG | HEART RATE: 75 BPM

## 2019-09-27 DIAGNOSIS — I10 ESSENTIAL HYPERTENSION: Primary | Chronic | ICD-10-CM

## 2019-09-27 DIAGNOSIS — I48.20 ATRIAL FIBRILLATION, CHRONIC: Chronic | ICD-10-CM

## 2019-09-27 DIAGNOSIS — Z48.02 VISIT FOR SUTURE REMOVAL: ICD-10-CM

## 2019-09-27 PROCEDURE — 3078F DIAST BP <80 MM HG: CPT | Mod: HCNC,CPTII,S$GLB, | Performed by: FAMILY MEDICINE

## 2019-09-27 PROCEDURE — 1101F PR PT FALLS ASSESS DOC 0-1 FALLS W/OUT INJ PAST YR: ICD-10-PCS | Mod: HCNC,CPTII,S$GLB, | Performed by: FAMILY MEDICINE

## 2019-09-27 PROCEDURE — 1101F PT FALLS ASSESS-DOCD LE1/YR: CPT | Mod: HCNC,CPTII,S$GLB, | Performed by: FAMILY MEDICINE

## 2019-09-27 PROCEDURE — 99213 PR OFFICE/OUTPT VISIT, EST, LEVL III, 20-29 MIN: ICD-10-PCS | Mod: HCNC,S$GLB,, | Performed by: FAMILY MEDICINE

## 2019-09-27 PROCEDURE — 99999 PR PBB SHADOW E&M-EST. PATIENT-LVL III: CPT | Mod: PBBFAC,HCNC,, | Performed by: FAMILY MEDICINE

## 2019-09-27 PROCEDURE — 3075F PR MOST RECENT SYSTOLIC BLOOD PRESS GE 130-139MM HG: ICD-10-PCS | Mod: HCNC,CPTII,S$GLB, | Performed by: FAMILY MEDICINE

## 2019-09-27 PROCEDURE — 3078F PR MOST RECENT DIASTOLIC BLOOD PRESSURE < 80 MM HG: ICD-10-PCS | Mod: HCNC,CPTII,S$GLB, | Performed by: FAMILY MEDICINE

## 2019-09-27 PROCEDURE — 99213 OFFICE O/P EST LOW 20 MIN: CPT | Mod: HCNC,S$GLB,, | Performed by: FAMILY MEDICINE

## 2019-09-27 PROCEDURE — 99999 PR PBB SHADOW E&M-EST. PATIENT-LVL III: ICD-10-PCS | Mod: PBBFAC,HCNC,, | Performed by: FAMILY MEDICINE

## 2019-09-27 PROCEDURE — 3075F SYST BP GE 130 - 139MM HG: CPT | Mod: HCNC,CPTII,S$GLB, | Performed by: FAMILY MEDICINE

## 2019-09-27 NOTE — PROGRESS NOTES
Subjective:       Patient ID: Lexa Hammond is a 84 y.o. male.    Chief Complaint: Suture / Staple Removal    F/U:      Pt is a 84 year old [facility] Cut his arm on treadmill. Had to have 7 sutures. Pt is on anticoag. Pt laceration is doing well. No fever.    Review of Systems   Constitutional: Negative.    Skin:        laceration       Objective:      Physical Exam   Constitutional: He is oriented to person, place, and time. He appears well-developed and well-nourished.   Cardiovascular: Normal rate and regular rhythm. Exam reveals no friction rub.   No murmur heard.  Pulmonary/Chest: Effort normal and breath sounds normal. No stridor. He has no wheezes.   Neurological: He is alert and oriented to person, place, and time.   Skin:            Assessment:       1. Essential hypertension    2. Atrial fibrillation, chronic    3. Visit for suture removal        Plan:       Essential hypertension  Comments:  Pt BP is well controlled    Atrial fibrillation, chronic  Comments:  HR is controlled    Visit for suture removal  Comments:  7 sutures removed without incident.

## 2019-10-02 ENCOUNTER — PATIENT MESSAGE (OUTPATIENT)
Dept: INTERNAL MEDICINE | Facility: CLINIC | Age: 84
End: 2019-10-02

## 2019-10-02 ENCOUNTER — OFFICE VISIT (OUTPATIENT)
Dept: INTERNAL MEDICINE | Facility: CLINIC | Age: 84
End: 2019-10-02
Payer: MEDICARE

## 2019-10-02 ENCOUNTER — HOSPITAL ENCOUNTER (EMERGENCY)
Facility: HOSPITAL | Age: 84
Discharge: HOME | End: 2019-10-02
Attending: EMERGENCY MEDICINE
Payer: MEDICARE

## 2019-10-02 ENCOUNTER — TELEPHONE (OUTPATIENT)
Dept: INTERNAL MEDICINE | Facility: CLINIC | Age: 84
End: 2019-10-02

## 2019-10-02 VITALS
SYSTOLIC BLOOD PRESSURE: 110 MMHG | BODY MASS INDEX: 23.06 KG/M2 | WEIGHT: 151.69 LBS | SYSTOLIC BLOOD PRESSURE: 110 MMHG | HEIGHT: 68 IN | TEMPERATURE: 99 F | DIASTOLIC BLOOD PRESSURE: 64 MMHG | RESPIRATION RATE: 20 BRPM | HEART RATE: 105 BPM | OXYGEN SATURATION: 96 % | BODY MASS INDEX: 23.39 KG/M2 | OXYGEN SATURATION: 97 % | DIASTOLIC BLOOD PRESSURE: 60 MMHG | TEMPERATURE: 99 F | WEIGHT: 154.31 LBS | HEART RATE: 87 BPM

## 2019-10-02 DIAGNOSIS — R35.0 URINARY FREQUENCY: ICD-10-CM

## 2019-10-02 DIAGNOSIS — R53.83 FATIGUE, UNSPECIFIED TYPE: ICD-10-CM

## 2019-10-02 DIAGNOSIS — R50.9 FEVER: ICD-10-CM

## 2019-10-02 DIAGNOSIS — Z23 NEED FOR INFLUENZA VACCINATION: ICD-10-CM

## 2019-10-02 DIAGNOSIS — N39.0 URINARY TRACT INFECTION WITHOUT HEMATURIA, SITE UNSPECIFIED: Primary | ICD-10-CM

## 2019-10-02 DIAGNOSIS — R30.0 BURNING WITH URINATION: ICD-10-CM

## 2019-10-02 DIAGNOSIS — N30.00 ACUTE CYSTITIS WITHOUT HEMATURIA: Primary | ICD-10-CM

## 2019-10-02 LAB
ALBUMIN SERPL BCP-MCNC: 3.1 G/DL (ref 3.5–5.2)
ALP SERPL-CCNC: 81 U/L (ref 55–135)
ALT SERPL W/O P-5'-P-CCNC: 10 U/L (ref 10–44)
ANION GAP SERPL CALC-SCNC: 9 MMOL/L (ref 8–16)
APTT BLDCRRT: 35.5 SEC (ref 21–32)
AST SERPL-CCNC: 23 U/L (ref 10–40)
BACTERIA #/AREA URNS HPF: ABNORMAL /HPF
BASOPHILS # BLD AUTO: 0.08 K/UL (ref 0–0.2)
BASOPHILS NFR BLD: 0.4 % (ref 0–1.9)
BILIRUB SERPL-MCNC: 1.1 MG/DL (ref 0.1–1)
BILIRUB UR QL STRIP: NEGATIVE
BUN SERPL-MCNC: 32 MG/DL (ref 8–23)
CALCIUM SERPL-MCNC: 8.3 MG/DL (ref 8.7–10.5)
CHLORIDE SERPL-SCNC: 100 MMOL/L (ref 95–110)
CLARITY UR: CLEAR
CO2 SERPL-SCNC: 24 MMOL/L (ref 23–29)
COLOR UR: YELLOW
CREAT SERPL-MCNC: 1.2 MG/DL (ref 0.5–1.4)
DIFFERENTIAL METHOD: ABNORMAL
EOSINOPHIL # BLD AUTO: 0.1 K/UL (ref 0–0.5)
EOSINOPHIL NFR BLD: 0.6 % (ref 0–8)
ERYTHROCYTE [DISTWIDTH] IN BLOOD BY AUTOMATED COUNT: 15.2 % (ref 11.5–14.5)
EST. GFR  (AFRICAN AMERICAN): >60 ML/MIN/1.73 M^2
EST. GFR  (NON AFRICAN AMERICAN): 56 ML/MIN/1.73 M^2
GLUCOSE SERPL-MCNC: 121 MG/DL (ref 70–110)
GLUCOSE UR QL STRIP: NEGATIVE
HCT VFR BLD AUTO: 32 % (ref 40–54)
HGB BLD-MCNC: 10.4 G/DL (ref 14–18)
HGB UR QL STRIP: ABNORMAL
IMM GRANULOCYTES # BLD AUTO: 0.17 K/UL (ref 0–0.04)
IMM GRANULOCYTES NFR BLD AUTO: 0.9 % (ref 0–0.5)
INFLUENZA A, MOLECULAR: NEGATIVE
INFLUENZA B, MOLECULAR: NEGATIVE
INR PPP: 1.1 (ref 0.8–1.2)
KETONES UR QL STRIP: NEGATIVE
LACTATE SERPL-SCNC: 1.4 MMOL/L (ref 0.5–2.2)
LEUKOCYTE ESTERASE UR QL STRIP: ABNORMAL
LYMPHOCYTES # BLD AUTO: 1.9 K/UL (ref 1–4.8)
LYMPHOCYTES NFR BLD: 9.3 % (ref 18–48)
MAGNESIUM SERPL-MCNC: 1.8 MG/DL (ref 1.6–2.6)
MCH RBC QN AUTO: 30.7 PG (ref 27–31)
MCHC RBC AUTO-ENTMCNC: 32.5 G/DL (ref 32–36)
MCV RBC AUTO: 94 FL (ref 82–98)
MICROSCOPIC COMMENT: ABNORMAL
MONOCYTES # BLD AUTO: 1.6 K/UL (ref 0.3–1)
MONOCYTES NFR BLD: 8.1 % (ref 4–15)
NEUTROPHILS # BLD AUTO: 16 K/UL (ref 1.8–7.7)
NEUTROPHILS NFR BLD: 80.7 % (ref 38–73)
NITRITE UR QL STRIP: NEGATIVE
NRBC BLD-RTO: 0 /100 WBC
PH UR STRIP: 6 [PH] (ref 5–8)
PLATELET # BLD AUTO: 183 K/UL (ref 150–350)
PMV BLD AUTO: 9.6 FL (ref 9.2–12.9)
POTASSIUM SERPL-SCNC: 4 MMOL/L (ref 3.5–5.1)
PROCALCITONIN SERPL IA-MCNC: 0.15 NG/ML
PROT SERPL-MCNC: 6.6 G/DL (ref 6–8.4)
PROT UR QL STRIP: NEGATIVE
PROTHROMBIN TIME: 12.4 SEC (ref 9–12.5)
RBC # BLD AUTO: 3.39 M/UL (ref 4.6–6.2)
RBC #/AREA URNS HPF: 3 /HPF (ref 0–4)
SODIUM SERPL-SCNC: 133 MMOL/L (ref 136–145)
SP GR UR STRIP: <=1.005 (ref 1–1.03)
SPECIMEN SOURCE: NORMAL
URN SPEC COLLECT METH UR: ABNORMAL
UROBILINOGEN UR STRIP-ACNC: ABNORMAL EU/DL
WBC # BLD AUTO: 19.86 K/UL (ref 3.9–12.7)
WBC #/AREA URNS HPF: 20 /HPF (ref 0–5)

## 2019-10-02 PROCEDURE — G0008 ADMIN INFLUENZA VIRUS VAC: HCPCS | Mod: HCNC,S$GLB,, | Performed by: INTERNAL MEDICINE

## 2019-10-02 PROCEDURE — 85610 PROTHROMBIN TIME: CPT | Mod: HCNC

## 2019-10-02 PROCEDURE — 80053 COMPREHEN METABOLIC PANEL: CPT | Mod: HCNC

## 2019-10-02 PROCEDURE — 3074F SYST BP LT 130 MM HG: CPT | Mod: HCNC,CPTII,S$GLB, | Performed by: INTERNAL MEDICINE

## 2019-10-02 PROCEDURE — 3078F DIAST BP <80 MM HG: CPT | Mod: HCNC,CPTII,S$GLB, | Performed by: INTERNAL MEDICINE

## 2019-10-02 PROCEDURE — 1101F PT FALLS ASSESS-DOCD LE1/YR: CPT | Mod: HCNC,CPTII,S$GLB, | Performed by: INTERNAL MEDICINE

## 2019-10-02 PROCEDURE — 1101F PR PT FALLS ASSESS DOC 0-1 FALLS W/OUT INJ PAST YR: ICD-10-PCS | Mod: HCNC,CPTII,S$GLB, | Performed by: INTERNAL MEDICINE

## 2019-10-02 PROCEDURE — 81000 URINALYSIS NONAUTO W/SCOPE: CPT | Mod: 91,HCNC

## 2019-10-02 PROCEDURE — 99214 PR OFFICE/OUTPT VISIT, EST, LEVL IV, 30-39 MIN: ICD-10-PCS | Mod: 25,HCNC,S$GLB, | Performed by: INTERNAL MEDICINE

## 2019-10-02 PROCEDURE — 87502 INFLUENZA DNA AMP PROBE: CPT | Mod: HCNC

## 2019-10-02 PROCEDURE — 96374 THER/PROPH/DIAG INJ IV PUSH: CPT | Mod: HCNC

## 2019-10-02 PROCEDURE — 87086 URINE CULTURE/COLONY COUNT: CPT | Mod: 59,HCNC

## 2019-10-02 PROCEDURE — G0008 FLU VACCINE - HIGH DOSE (65+) PRESERVATIVE FREE IM: ICD-10-PCS | Mod: HCNC,S$GLB,, | Performed by: INTERNAL MEDICINE

## 2019-10-02 PROCEDURE — 90662 IIV NO PRSV INCREASED AG IM: CPT | Mod: HCNC,S$GLB,, | Performed by: INTERNAL MEDICINE

## 2019-10-02 PROCEDURE — 93005 ELECTROCARDIOGRAM TRACING: CPT | Mod: HCNC

## 2019-10-02 PROCEDURE — 99999 PR PBB SHADOW E&M-EST. PATIENT-LVL V: ICD-10-PCS | Mod: PBBFAC,HCNC,, | Performed by: INTERNAL MEDICINE

## 2019-10-02 PROCEDURE — 83605 ASSAY OF LACTIC ACID: CPT | Mod: HCNC

## 2019-10-02 PROCEDURE — 99285 EMERGENCY DEPT VISIT HI MDM: CPT | Mod: 25,HCNC

## 2019-10-02 PROCEDURE — 85730 THROMBOPLASTIN TIME PARTIAL: CPT | Mod: HCNC

## 2019-10-02 PROCEDURE — 25000003 PHARM REV CODE 250: Mod: HCNC | Performed by: EMERGENCY MEDICINE

## 2019-10-02 PROCEDURE — 99999 PR PBB SHADOW E&M-EST. PATIENT-LVL V: CPT | Mod: PBBFAC,HCNC,, | Performed by: INTERNAL MEDICINE

## 2019-10-02 PROCEDURE — 93010 EKG 12-LEAD: ICD-10-PCS | Mod: HCNC,,, | Performed by: INTERNAL MEDICINE

## 2019-10-02 PROCEDURE — 99214 OFFICE O/P EST MOD 30 MIN: CPT | Mod: 25,HCNC,S$GLB, | Performed by: INTERNAL MEDICINE

## 2019-10-02 PROCEDURE — 85025 COMPLETE CBC W/AUTO DIFF WBC: CPT | Mod: 91,HCNC

## 2019-10-02 PROCEDURE — 84145 PROCALCITONIN (PCT): CPT | Mod: HCNC

## 2019-10-02 PROCEDURE — 87040 BLOOD CULTURE FOR BACTERIA: CPT | Mod: 59,HCNC

## 2019-10-02 PROCEDURE — 90662 FLU VACCINE - HIGH DOSE (65+) PRESERVATIVE FREE IM: ICD-10-PCS | Mod: HCNC,S$GLB,, | Performed by: INTERNAL MEDICINE

## 2019-10-02 PROCEDURE — 3074F PR MOST RECENT SYSTOLIC BLOOD PRESSURE < 130 MM HG: ICD-10-PCS | Mod: HCNC,CPTII,S$GLB, | Performed by: INTERNAL MEDICINE

## 2019-10-02 PROCEDURE — 93010 ELECTROCARDIOGRAM REPORT: CPT | Mod: HCNC,,, | Performed by: INTERNAL MEDICINE

## 2019-10-02 PROCEDURE — 83735 ASSAY OF MAGNESIUM: CPT | Mod: HCNC

## 2019-10-02 PROCEDURE — 63600175 PHARM REV CODE 636 W HCPCS: Mod: HCNC | Performed by: EMERGENCY MEDICINE

## 2019-10-02 PROCEDURE — 3078F PR MOST RECENT DIASTOLIC BLOOD PRESSURE < 80 MM HG: ICD-10-PCS | Mod: HCNC,CPTII,S$GLB, | Performed by: INTERNAL MEDICINE

## 2019-10-02 RX ORDER — SULFAMETHOXAZOLE AND TRIMETHOPRIM 800; 160 MG/1; MG/1
1 TABLET ORAL 2 TIMES DAILY
Qty: 10 TABLET | Refills: 0 | Status: SHIPPED | OUTPATIENT
Start: 2019-10-02 | End: 2019-10-02 | Stop reason: SDUPTHER

## 2019-10-02 RX ORDER — SULFAMETHOXAZOLE AND TRIMETHOPRIM 800; 160 MG/1; MG/1
1 TABLET ORAL 2 TIMES DAILY
Qty: 14 TABLET | Refills: 0 | OUTPATIENT
Start: 2019-10-02 | End: 2019-10-02

## 2019-10-02 RX ORDER — CEFUROXIME AXETIL 500 MG/1
500 TABLET ORAL 2 TIMES DAILY
Qty: 14 TABLET | Refills: 0 | Status: SHIPPED | OUTPATIENT
Start: 2019-10-02 | End: 2019-10-09

## 2019-10-02 RX ORDER — ACETAMINOPHEN 325 MG/1
650 TABLET ORAL
Status: COMPLETED | OUTPATIENT
Start: 2019-10-02 | End: 2019-10-02

## 2019-10-02 RX ADMIN — CEFTRIAXONE 1 G: 1 INJECTION, SOLUTION INTRAVENOUS at 08:10

## 2019-10-02 RX ADMIN — ACETAMINOPHEN 650 MG: 325 TABLET ORAL at 06:10

## 2019-10-02 NOTE — TELEPHONE ENCOUNTER
Contacted Pt's daugther and Wife about Dr. Sánchez orders for Mr. Hammond to report to the ER for Elevated CBC white blood cell counts that might be caused from a Serious Infection that might need IV antibiotics ,   Dr. Sánchez is recommending Pt. Goes to [facility] so the Doctor can see Pt's records and his results notes, Pt's Daughter and Wife verbalized understanding...//stella

## 2019-10-02 NOTE — ED PROVIDER NOTES
SCRIBE #1 NOTE: I, Ger Tavera, am scribing for, and in the presence of, Francie Sepulveda MD. I have scribed the entire note.       History     Chief Complaint   Patient presents with    Abnormal Lab     sent from Dr Cadena's office for WBC 23k     Review of patient's allergies indicates:   Allergen Reactions    Coumadin [warfarin] Other (See Comments)     Problems with anticoagulation, on x 6 years, Doing better w/Xaralto         History of Present Illness     HPI    10/2/2019, 6:38 PM  History obtained from the patient      History of Present Illness: Lexa Hammond is a 84 y.o. male patient who presents to the Emergency Department for evaluation of abnormal labs. Pt was referred to the ED by Dr. Sánchez's office for WBC of 23,000. Pt c/o dysuria which onset suddenly several days ago. Symptoms are intermittent and moderate in severity. No mitigating or exacerbating factors reported. Associated sxs include subjective fever and urinary frequency. Patient denies any flank pain, chills, abd pain, hematuria, urinary urgency, n/v/d, cough, runny nose, back pain, and all other sxs at this time. No prior Tx reported. No further complaints or concerns at this time.         Arrival mode: Personal vehicle    PCP: Sam Reyes MD        Past Medical History:  Past Medical History:   Diagnosis Date    Abnormal ECG 6/24/2013    Angina pectoris     Anticoagulant long-term use     Atrial fibrillation, chronic 6/24/2013     Cardioversion successful for 1 month only    Benign hematuria     ingerman 03    BPH (benign prostatic hyperplasia)     munoz    CAD (coronary artery disease)     burgos    Carotid artery disease 6/24/2013    olinde    Chronic ischemic heart disease 1/13/2016    Colon polyp 4/26/2015    Tubular adenoma 10/3/2007     Depression 10/7/2016    Diaphragmatic hernia without obstruction and without gangrene 4/26/2015    CT-7/8/13: Small hernia is noted posteromedially the right hemidiaphragm  with some intrathoracic protrusion of abdominal fat.     Diverticulosis 4/26/2015 1/22/13 colo.     Fracture, right femur 03/2019    Dr. Xavier    Hemiparesis and alteration of sensations as late effects of stroke     CVA 2002     HTN (hypertension)     Hyperlipidemia     Inflammatory spondylopathy of lumbar region 3/28/2019    Nephrolithiasis 3/9/10    US left renal nonobstructing stone, Hxstones  every 20 years. passed.    Obstructive sleep apnea     dr berry    Osteoporosis, unspecified     Peripheral vascular disease 1/13/2016    Renal cyst, right 7/18/13    CT documented, known before [facility]    Stroke     right sided weakness    Trouble in sleeping     Urinary incontinence     occasional, and with stress/sneeze    Vertebral fracture 7/1/13    CT -indeterminate age L1 superior endplate comp fx.       Past Surgical History:  Past Surgical History:   Procedure Laterality Date    CATARACT EXTRACTION Bilateral     CEA Right 12/2009    Dr BELL Angeles    CHOLECYSTECTOMY  3/23/10    cystolithalopaxy  7/30/13    at time of second turp Also ureteral stricture treated.    EYE SURGERY Bilateral 1995 approx    cataracts    HIP SURGERY      INGUINAL HERNIA REPAIR Left 3/23/10    with choly    INGUINAL HERNIA REPAIR Right 3/9/12    PROSTATE SURGERY  7/30/13    laserTURP for BPH    PROSTATE SURGERY  2004 appox    1st TURP Dr [facility], date unsure    TRANSURETHRAL RESECTION OF PROSTATE      VASECTOMY  1965 approx    Dr Conley         Family History:  Family History   Problem Relation Age of Onset    Diabetes Mother     Hypertension Mother     Hypertension Sister     Hypertension Brother     Hypertension Sister     Stroke Father     Cancer Father     Bipolar disorder Sister     Schizophrenia Sister     Cancer Daughter         non Hodgkins    Alcohol abuse Neg Hx     Asthma Neg Hx     Birth defects Neg Hx     COPD Neg Hx     Depression Neg Hx     Heart disease Neg  Hx     Mental illness Neg Hx     Mental retardation Neg Hx     Hearing loss Neg Hx     Kidney disease Neg Hx        Social History:  Social History     Tobacco Use    Smoking status: Never Smoker    Smokeless tobacco: Never Used   Substance and Sexual Activity    Alcohol use: No     Alcohol/week: 0.0 standard drinks     Comment: very rarely    Drug use: No    Sexual activity: Not Currently     Partners: Female        Review of Systems     Review of Systems   Constitutional: Positive for fever (subjective). Negative for chills.   HENT: Negative for rhinorrhea and sore throat.    Respiratory: Negative for cough and shortness of breath.    Cardiovascular: Positive for leg swelling (b/l, chronic). Negative for chest pain.   Gastrointestinal: Negative for abdominal pain, diarrhea, nausea and vomiting.   Genitourinary: Positive for dysuria and frequency. Negative for flank pain, hematuria and urgency.   Musculoskeletal: Negative for back pain.   Skin: Negative for rash.   Neurological: Negative for weakness.   Hematological: Does not bruise/bleed easily.   All other systems reviewed and are negative.       Physical Exam     Initial Vitals   BP Pulse Resp Temp SpO2   10/02/19 1704 10/02/19 1704 10/02/19 1704 10/02/19 1810 10/02/19 1704   123/63 98 17 (!) 100.9 °F (38.3 °C) 96 %      MAP       --                 Physical Exam  Nursing Notes and Vital Signs Reviewed.  Constitutional: Patient is in no acute distress. Well-developed and well-nourished. Non-toxic. Non ill appearing.  Head: Atraumatic. Normocephalic.  Eyes: PERRL. EOM intact. Conjunctivae are not pale. No scleral icterus.  ENT: Mucous membranes are moist. Oropharynx is clear and symmetric.    Neck: Supple. Full ROM. No lymphadenopathy.  Cardiovascular: Regular rate. Irregularly irregular rhythm. No murmurs, rubs, or gallops. Distal pulses are 2+ and symmetric. 2+ edema to the bilateral lower extremities.  Pulmonary/Chest: No respiratory distress. Clear  "to auscultation bilaterally. No wheezing or rales.  Abdominal: Soft and non-distended.  There is no tenderness.  No rebound, guarding, or rigidity. Good bowel sounds.  Genitourinary: No CVA tenderness  Musculoskeletal: Moves all extremities. No obvious deformities. No calf tenderness.  Skin: Warm and dry.  Neurological:  Alert, awake, and appropriate.  Normal speech.  No acute focal neurological deficits are appreciated.  Psychiatric: Normal affect. Good eye contact. Appropriate in content.     ED Course   Procedures  ED Vital Signs:  Vitals:    10/02/19 1704 10/02/19 1810 10/02/19 1830 10/02/19 1836   BP: 123/63  131/72    Pulse: 98  93 89   Resp: 17  18 20   Temp:  (!) 100.9 °F (38.3 °C)  99 °F (37.2 °C)   TempSrc:    Oral   SpO2: 96%  97% 97%   Weight: 70 kg (154 lb 5.2 oz)      Height: 5' 8" (1.727 m)       10/02/19 2045   BP: 110/64   Pulse: 87   Resp: 20   Temp:    TempSrc:    SpO2: 96%   Weight:    Height:        Abnormal Lab Results:  Labs Reviewed   CBC W/ AUTO DIFFERENTIAL - Abnormal; Notable for the following components:       Result Value    WBC 19.86 (*)     RBC 3.39 (*)     Hemoglobin 10.4 (*)     Hematocrit 32.0 (*)     RDW 15.2 (*)     Immature Granulocytes 0.9 (*)     Gran # (ANC) 16.0 (*)     Immature Grans (Abs) 0.17 (*)     Mono # 1.6 (*)     Gran% 80.7 (*)     Lymph% 9.3 (*)     All other components within normal limits   COMPREHENSIVE METABOLIC PANEL - Abnormal; Notable for the following components:    Sodium 133 (*)     Glucose 121 (*)     BUN, Bld 32 (*)     Calcium 8.3 (*)     Albumin 3.1 (*)     Total Bilirubin 1.1 (*)     eGFR if non  56 (*)     All other components within normal limits   URINALYSIS, REFLEX TO URINE CULTURE - Abnormal; Notable for the following components:    Specific Gravity, UA <=1.005 (*)     Occult Blood UA 3+ (*)     Urobilinogen, UA 4.0-6.0 (*)     Leukocytes, UA 2+ (*)     All other components within normal limits    Narrative:     Preferred " Collection Type->Urine, Clean Catch   APTT - Abnormal; Notable for the following components:    aPTT 35.5 (*)     All other components within normal limits   URINALYSIS MICROSCOPIC - Abnormal; Notable for the following components:    WBC, UA 20 (*)     All other components within normal limits    Narrative:     Preferred Collection Type->Urine, Clean Catch   INFLUENZA A & B BY MOLECULAR   CULTURE, BLOOD   CULTURE, BLOOD   CULTURE, URINE   LACTIC ACID, PLASMA   MAGNESIUM   PROCALCITONIN   PROTIME-INR        All Lab Results:  Results for orders placed or performed during the hospital encounter of 10/02/19   Influenza A & B by Molecular   Result Value Ref Range    Influenza A, Molecular Negative Negative    Influenza B, Molecular Negative Negative    Flu A & B Source Nasal swab    CBC auto differential   Result Value Ref Range    WBC 19.86 (H) 3.90 - 12.70 K/uL    RBC 3.39 (L) 4.60 - 6.20 M/uL    Hemoglobin 10.4 (L) 14.0 - 18.0 g/dL    Hematocrit 32.0 (L) 40.0 - 54.0 %    Mean Corpuscular Volume 94 82 - 98 fL    Mean Corpuscular Hemoglobin 30.7 27.0 - 31.0 pg    Mean Corpuscular Hemoglobin Conc 32.5 32.0 - 36.0 g/dL    RDW 15.2 (H) 11.5 - 14.5 %    Platelets 183 150 - 350 K/uL    MPV 9.6 9.2 - 12.9 fL    Immature Granulocytes 0.9 (H) 0.0 - 0.5 %    Gran # (ANC) 16.0 (H) 1.8 - 7.7 K/uL    Immature Grans (Abs) 0.17 (H) 0.00 - 0.04 K/uL    Lymph # 1.9 1.0 - 4.8 K/uL    Mono # 1.6 (H) 0.3 - 1.0 K/uL    Eos # 0.1 0.0 - 0.5 K/uL    Baso # 0.08 0.00 - 0.20 K/uL    nRBC 0 0 /100 WBC    Gran% 80.7 (H) 38.0 - 73.0 %    Lymph% 9.3 (L) 18.0 - 48.0 %    Mono% 8.1 4.0 - 15.0 %    Eosinophil% 0.6 0.0 - 8.0 %    Basophil% 0.4 0.0 - 1.9 %    Differential Method Automated    Comprehensive metabolic panel   Result Value Ref Range    Sodium 133 (L) 136 - 145 mmol/L    Potassium 4.0 3.5 - 5.1 mmol/L    Chloride 100 95 - 110 mmol/L    CO2 24 23 - 29 mmol/L    Glucose 121 (H) 70 - 110 mg/dL    BUN, Bld 32 (H) 8 - 23 mg/dL    Creatinine 1.2  0.5 - 1.4 mg/dL    Calcium 8.3 (L) 8.7 - 10.5 mg/dL    Total Protein 6.6 6.0 - 8.4 g/dL    Albumin 3.1 (L) 3.5 - 5.2 g/dL    Total Bilirubin 1.1 (H) 0.1 - 1.0 mg/dL    Alkaline Phosphatase 81 55 - 135 U/L    AST 23 10 - 40 U/L    ALT 10 10 - 44 U/L    Anion Gap 9 8 - 16 mmol/L    eGFR if African American >60 >60 mL/min/1.73 m^2    eGFR if non African American 56 (A) >60 mL/min/1.73 m^2   Lactic acid, plasma #1   Result Value Ref Range    Lactate (Lactic Acid) 1.4 0.5 - 2.2 mmol/L   Urinalysis, Reflex to Urine Culture Urine, Clean Catch   Result Value Ref Range    Specimen UA Urine, Clean Catch     Color, UA Yellow Yellow, Straw, Marlen    Appearance, UA Clear Clear    pH, UA 6.0 5.0 - 8.0    Specific Gravity, UA <=1.005 (A) 1.005 - 1.030    Protein, UA Negative Negative    Glucose, UA Negative Negative    Ketones, UA Negative Negative    Bilirubin (UA) Negative Negative    Occult Blood UA 3+ (A) Negative    Nitrite, UA Negative Negative    Urobilinogen, UA 4.0-6.0 (A) <2.0 EU/dL    Leukocytes, UA 2+ (A) Negative   Magnesium   Result Value Ref Range    Magnesium 1.8 1.6 - 2.6 mg/dL   Procalcitonin   Result Value Ref Range    Procalcitonin 0.15 <0.25 ng/mL   Protime-INR   Result Value Ref Range    Prothrombin Time 12.4 9.0 - 12.5 sec    INR 1.1 0.8 - 1.2   APTT   Result Value Ref Range    aPTT 35.5 (H) 21.0 - 32.0 sec   Urinalysis Microscopic   Result Value Ref Range    RBC, UA 3 0 - 4 /hpf    WBC, UA 20 (H) 0 - 5 /hpf    Bacteria Rare None-Occ /hpf    Microscopic Comment SEE COMMENT          Imaging Results:  Imaging Results          X-Ray Chest PA And Lateral (Final result)  Result time 10/02/19 19:46:04    Final result by Brandin Smith MD (10/02/19 19:46:04)                 Impression:      No acute findings.      Electronically signed by: Brandin Smith MD  Date:    10/02/2019  Time:    19:46             Narrative:    EXAMINATION:  XR CHEST PA AND LATERAL    CLINICAL HISTORY  Fever with elevated white  blood cell count.,    COMPARISON:  07/22/2013    FINDINGS:  Mild cardiomegaly with aortic atherosclerosis.  Clear lungs.  Moderate thoracic spondylosis.                                 The EKG was ordered, reviewed, and independently interpreted by the ED provider.  Interpretation time: 1830  Rate: 94 BPM  Rhythm: Atrial fibrillation  Interpretation: Left axis deviation. Incomplete RBBB. Anteroseptal infarct. No STEMI.           The Emergency Provider reviewed the vital signs and test results, which are outlined above.     ED Discussion     8:06 PM: Reassessed pt at this time, lactic acid and procalcitonin both normal, clinically nontoxic appearing, no abdominal pain, VSS, stable for discharge home with outpatient treatment.  Pt states his condition has improved at this time. Discussed with pt all pertinent ED information and results. Discussed pt dx and plan of tx. Gave pt all f/u and return to the ED instructions. All questions and concerns were addressed at this time. Pt expresses understanding of information and instructions, and is comfortable with plan to discharge. Pt is stable for discharge. Pt instructed not to take bactrim which has been previously prescribed and will instead prescribe Ceftin. Instructed pt to follow up with PCP and urologist.     I discussed with patient and/or family/caretaker that evaluation in the ED does not suggest any emergent or life threatening medical conditions requiring immediate intervention beyond what was provided in the ED, and I believe patient is safe for discharge.  Regardless, an unremarkable evaluation in the ED does not preclude the development or presence of a serious of life threatening condition. As such, patient was instructed to return immediately for any worsening or change in current symptoms.    I counseled the patient on the risks of taking antibiotics, including taking all doses as prescribed. I explained the risk of antibiotic resistance in the future and  susceptibility to C. diff infection, severe allergic reactions, and yeast infections.     I discussed with patient and/or family/caretaker that negative X-ray does not rule out occult fracture or other soft tissue injury.  Persistent pain greater than 7-10 days or increased pain requires follow up, specifically with orthopedics.          Medical Decision Making:   Clinical Tests:   Lab Tests: Ordered and Reviewed  Radiological Study: Reviewed and Ordered  Medical Tests: Reviewed and Ordered           ED Medication(s):  Medications   acetaminophen tablet 650 mg (650 mg Oral Given 10/2/19 1845)   cefTRIAXone (ROCEPHIN) 1 g in dextrose 5 % 50 mL IVPB (1 g Intravenous New Bag 10/2/19 2004)       Discharge Medication List as of 10/2/2019  8:04 PM      START taking these medications    Details   cefUROXime (CEFTIN) 500 MG tablet Take 1 tablet (500 mg total) by mouth 2 (two) times daily. for 7 days, Starting Wed 10/2/2019, Until Wed 10/9/2019, Normal              Medication List      START taking these medications    cefUROXime 500 MG tablet  Commonly known as:  CEFTIN  Take 1 tablet (500 mg total) by mouth 2 (two) times daily. for 7 days        STOP taking these medications    sulfamethoxazole-trimethoprim 800-160mg 800-160 mg Tab  Commonly known as:  BACTRIM DS        ASK your doctor about these medications    amLODIPine 10 MG tablet  Commonly known as:  NORVASC  Take 1 tablet (10 mg total) by mouth once daily.     aspirin 81 MG EC tablet  Commonly known as:  ECOTRIN     betamethasone valerate 0.1% 0.1 % Oint  Commonly known as:  VALISONE  Apply topically 2 (two) times daily.     blood pressure monitor Kit     co-enzyme Q-10 30 mg capsule     hydrALAZINE 25 MG tablet  Commonly known as:  APRESOLINE  TAKE 1 TABLET(25 MG) BY MOUTH THREE TIMES DAILY     ibandronate 150 mg tablet  Commonly known as:  BONIVA  TAKE 1 TABLET BY MOUTH EVERY 30 DAYS AS DIRECTED     incontinence pad, liner, disp Pads     losartan 50 MG  tablet  Commonly known as:  COZAAR  Take 1 tablet (50 mg total) by mouth once daily.     multivitamin with minerals tablet     oxybutynin 5 MG Tab  Commonly known as:  DITROPAN  Take 1 tablet (5 mg total) by mouth 3 (three) times daily.     polyethylene glycol 17 gram Pwpk  Commonly known as:  GLYCOLAX  Take 17 g by mouth once daily.     rivaroxaban 20 mg Tab  Commonly known as:  XARELTO  Take 1 tablet (20 mg total) by mouth once daily.     tamsulosin 0.4 mg Cap  Commonly known as:  FLOMAX  Take 1 capsule (0.4 mg total) by mouth once daily.     VITAMIN D3 5,000 unit Tab  Generic drug:  cholecalciferol (vitamin D3)           Where to Get Your Medications      These medications were sent to [id number] - [facility] [address] [facility] [place] [address] [place] [place] [address]    Phone:  [phone]   · cefUROXime 500 MG tablet         Follow-up Information     Sam Reyes MD. Schedule an appointment as soon as possible for a visit in 2 days.    Specialty:  Family Medicine  Why:  Return to the Emergency Room, If symptoms worsen  Contact information:  [address]  [place] [address]  [phone]             Ren Guevara IV, MD. Schedule an appointment as soon as possible for a visit in 2 days.    Specialty:  Urology  Why:  Return to the Emergency Room, If symptoms worsen  Contact information:  [id number] [place] [address] [address] [place] [address]  [phone]                       Scribe Attestation:   Scribe #1: I performed the above scribed service and the documentation accurately describes the services I performed. I attest to the accuracy of the note.     Attending:   Physician Attestation Statement for Scribe #1: I, Francie Sepulveda MD, personally performed the services described in this documentation, as scribed by Ger Tavera, in my presence, and it is both accurate and complete.           Clinical Impression       ICD-10-CM ICD-9-CM   1. Acute  cystitis without hematuria N30.00 595.0   2. Fever R50.9 780.60       Disposition:   Disposition: Discharged  Condition: Stable         Francie Sepulveda MD  10/03/19 0015

## 2019-10-02 NOTE — ED NOTES
Pt reports going to his PCP today because he has been feeling bad. He was called at home and told he has an abnormal WBC and needs to go to the ED.    Patient moved to ED room 07, patient assisted onto stretcher and changed into a gown. Patient placed on cardiac monitor, continuous pulse oximetry and automatic blood pressure cuff. Bed placed in low locked position, side rails up x 2, call light is within reach of patient or family, orientation to room and explanation of wait provided to family and patient, alarms set and turned on for monitor and pulse ox, awaiting MD evaluation and orders, will continue to monitor.    Patient identifies self as Lexa Hammond      LOC: The patient is awake, alert and aware of environment with an appropriate affect, the patient is oriented x 3 and speaking appropriately.  APPEARANCE: Patient resting comfortably and in no acute distress, patient is clean and well groomed, patient's clothing is properly fastened.  SKIN: The skin is warm and dry, color consistent with ethnicity, patient has normal skin turgor and moist mucus membranes, skin intact, no breakdown. EX: abrasion to the left forearm  MUSCULOSKELETAL: Patient moving all extremities well, no deformities noted. EX: BLE +2 edema.  RESPIRATORY: Airway is open and patent, respirations are spontaneous, patient has a normal effort and rate, no accessory muscle use noted.  CARDIAC: Patient is afib on monitor, no peripheral edema noted, capillary refill < 3 seconds.  ABDOMEN: Soft and non tender to palpation, no distention noted.  NEUROLOGIC: PERRL, eyes open spontaneously, behavior appropriate to situation, follows commands, facial expression symmetrical, bilateral hand grasp equal and even, purposeful motor response noted, normal sensation in all extremities when touched with a finger.

## 2019-10-02 NOTE — TELEPHONE ENCOUNTER
Please notify pt and family that cbc revealed very high white blood cell count which is consistent with a more serious/severe infection and that he should go to the ER to be evaluated for possible IV antibiotics.

## 2019-10-03 ENCOUNTER — TELEPHONE (OUTPATIENT)
Dept: UROLOGY | Facility: CLINIC | Age: 84
End: 2019-10-03

## 2019-10-03 ENCOUNTER — TELEPHONE (OUTPATIENT)
Dept: INTERNAL MEDICINE | Facility: CLINIC | Age: 84
End: 2019-10-03

## 2019-10-03 NOTE — TELEPHONE ENCOUNTER
Spoke to Mrs. Hammond concerning request for sooner appt. Offered Monday 10/7/19 at 2:00 pm, she agreed and stated that she will try to arrange for transportation.

## 2019-10-03 NOTE — TELEPHONE ENCOUNTER
----- Message from Zeenat Montejo sent at 10/2/2019  4:15 PM CDT -----  Contact: Spouse  Spouse is requesting call back in regards to questions about pt's being sent to ER           Pls call back at [phone]

## 2019-10-03 NOTE — TELEPHONE ENCOUNTER
----- Message from Lima Hoang sent at 10/3/2019  7:45 AM CDT -----  Contact: pt  Type:  Sooner Apoointment Request    Caller is requesting a sooner appointment.  Caller declined first available appointment listed below.  Caller will not accept being placed on the waitlist and is requesting a message be sent to doctor.  Name of Caller: the pt   When is the first available appointment? 11/27/2019  Symptoms: Hospital f/u  Would the patient rather a call back or a response via [facility]? Call back  Best Call Back Number: [phone]  Additional Information: the pt was in the ER yesterday and wants to be worked in within three day's.

## 2019-10-04 ENCOUNTER — TELEPHONE (OUTPATIENT)
Dept: UROLOGY | Facility: CLINIC | Age: 84
End: 2019-10-04

## 2019-10-04 LAB — BACTERIA UR CULT: NO GROWTH

## 2019-10-04 NOTE — TELEPHONE ENCOUNTER
Called [phone] and [phone], no answer, M stating that I was calling to confirm Mr. Hammond's appt on Monday 10/7/19 at 2:00 pm and asked for a call back.

## 2019-10-06 NOTE — PROGRESS NOTES
Subjective:      Patient ID: Lexa Hammond is a 84 y.o. male.    Chief Complaint: Urinary Tract Infection    HPI   83 yo with   Patient Active Problem List   Diagnosis    Benign prostatic hyperplasia with lower urinary tract symptoms    Atrial fibrillation, chronic    Carotid artery disease    Coronary artery disease    ZACK (obstructive sleep apnea)    HTN (hypertension)    Osteoporosis    Mixed hyperlipidemia    Chronic anticoagulation    Aorto-iliac atherosclerosis    Tricuspid regurgitation    Pulmonary nodule    Chronic ischemic heart disease    Memory deficit    Depression    Calcified granuloma of lung    Lumbar arthropathy    PVC's (premature ventricular contractions)    Pulmonary heart disease    History of CVA (cerebrovascular accident)    Abnormal ECG    Non-rheumatic mitral regurgitation    Pulmonary hypertension    Inflammatory spondylopathy of lumbar region    At risk for falls     Past Medical History:   Diagnosis Date    Abnormal ECG 6/24/2013    Angina pectoris     Anticoagulant long-term use     Atrial fibrillation, chronic 6/24/2013     Cardioversion successful for 1 month only    Benign hematuria     ingerman 03    BPH (benign prostatic hyperplasia)     munoz    CAD (coronary artery disease)     burgos    Carotid artery disease 6/24/2013    olinde    Chronic ischemic heart disease 1/13/2016    Colon polyp 4/26/2015    Tubular adenoma 10/3/2007     Depression 10/7/2016    Diaphragmatic hernia without obstruction and without gangrene 4/26/2015    CT-7/8/13: Small hernia is noted posteromedially the right hemidiaphragm with some intrathoracic protrusion of abdominal fat.     Diverticulosis 4/26/2015 1/22/13 colo.     Fracture, right femur 03/2019    Dr. Xavier    Hemiparesis and alteration of sensations as late effects of stroke     CVA 2002     HTN (hypertension)     Hyperlipidemia     Inflammatory spondylopathy of lumbar region 3/28/2019     Nephrolithiasis 3/9/10     left renal nonobstructing stone, Hxstones  every 20 years. passed.    Obstructive sleep apnea     dr berry    Osteoporosis, unspecified     Peripheral vascular disease 1/13/2016    Renal cyst, right 7/18/13    CT documented, known before per [facility] records    Stroke     right sided weakness    Trouble in sleeping     Urinary incontinence     occasional, and with stress/sneeze    Vertebral fracture 7/1/13    CT -indeterminate age L1 superior endplate comp fx.     Here today with his family. C/o frequency and burning with urination.  Symptoms have been present for 2 days.  He has also had some increased fatigue and feeling a little off balance.  There has been no falls.  He does have self catheterization and has a history of urinary tract infections.  His urologist is Dr. Guevara.  Symptoms have been moderate and have not changed.  He try Tylenol last night at 7:00 p.m.. Felt warm but no fever  Review of Systems   Constitutional: Negative for chills and fever.   HENT: Negative for ear pain and sore throat.    Respiratory: Negative for cough, shortness of breath and wheezing.    Cardiovascular: Negative for chest pain and palpitations.   Gastrointestinal: Negative for abdominal pain and blood in stool.   Genitourinary: Positive for dysuria and frequency. Negative for decreased urine volume, discharge, flank pain and testicular pain.   Skin: Negative for rash and wound.   Neurological: Positive for light-headedness. Negative for dizziness, seizures, syncope, weakness and headaches.   Psychiatric/Behavioral: Negative for hallucinations.     Objective:   /60 (BP Location: Right arm, Patient Position: Sitting, BP Method: Medium (Manual))   Pulse 105   Temp 99.1 °F (37.3 °C) (Tympanic)   Wt 68.8 kg (151 lb 10.8 oz)   SpO2 97%   BMI 23.06 kg/m²     Physical Exam   Constitutional: He appears well-developed and well-nourished. No distress.   Eyes: Conjunctivae and EOM are  normal.   Cardiovascular: Normal rate.   Tachycardic.   Pulmonary/Chest: Effort normal and breath sounds normal.   Abdominal: Soft. Bowel sounds are normal.   Genitourinary:   Genitourinary Comments: No CVA tenderness.   Musculoskeletal: He exhibits no edema.   Neurological: He is alert.   Skin: Skin is warm and dry.   Psychiatric: He has a normal mood and affect. His behavior is normal.       Assessment:     1. Urinary tract infection without hematuria, site unspecified    2. Urinary frequency    3. Fatigue, unspecified type    4. Burning with urination    5. Need for influenza vaccination      Plan:   Urinary tract infection without hematuria, site unspecified  -     Discontinue: sulfamethoxazole-trimethoprim 800-160mg (BACTRIM DS) 800-160 mg Tab; Take 1 tablet by mouth 2 (two) times daily. for 7 days  Dispense: 14 tablet; Refill: 0    Urinary frequency  -     Urinalysis; Future; Expected date: 10/02/2019  -     Urine culture; Future; Expected date: 10/02/2019  -     CBC auto differential; Future; Expected date: 10/02/2019  -     Basic metabolic panel; Future; Expected date: 10/02/2019  -     Discontinue: sulfamethoxazole-trimethoprim 800-160mg (BACTRIM DS) 800-160 mg Tab; Take 1 tablet by mouth 2 (two) times daily. for 5 days  Dispense: 10 tablet; Refill: 0  -     Discontinue: sulfamethoxazole-trimethoprim 800-160mg (BACTRIM DS) 800-160 mg Tab; Take 1 tablet by mouth 2 (two) times daily. for 5 days  Dispense: 10 tablet; Refill: 0    Fatigue, unspecified type  -     CBC auto differential; Future; Expected date: 10/02/2019  -     Basic metabolic panel; Future; Expected date: 10/02/2019  -     Discontinue: sulfamethoxazole-trimethoprim 800-160mg (BACTRIM DS) 800-160 mg Tab; Take 1 tablet by mouth 2 (two) times daily. for 5 days  Dispense: 10 tablet; Refill: 0  -     Discontinue: sulfamethoxazole-trimethoprim 800-160mg (BACTRIM DS) 800-160 mg Tab; Take 1 tablet by mouth 2 (two) times daily. for 5 days  Dispense: 10  tablet; Refill: 0    Burning with urination    Need for influenza vaccination    Other orders  -     Influenza - High Dose (65+) (PF) (IM)        Lab Frequency Next Occurrence   CAR Ultrasound doppler carotid bilateral Once 01/23/2020       Problem List Items Addressed This Visit     None      Visit Diagnoses     Urinary tract infection without hematuria, site unspecified    -  Primary    Urinary frequency        Relevant Orders    Urinalysis (Completed)    Urine culture (Completed)    CBC auto differential (Completed)    Basic metabolic panel (Completed)    Fatigue, unspecified type        Relevant Orders    CBC auto differential (Completed)    Basic metabolic panel (Completed)    Burning with urination        Need for influenza vaccination              No follow-ups on file.

## 2019-10-07 ENCOUNTER — OFFICE VISIT (OUTPATIENT)
Dept: UROLOGY | Facility: CLINIC | Age: 84
End: 2019-10-07
Payer: MEDICARE

## 2019-10-07 VITALS
HEIGHT: 68 IN | WEIGHT: 154.31 LBS | BODY MASS INDEX: 23.39 KG/M2 | SYSTOLIC BLOOD PRESSURE: 136 MMHG | DIASTOLIC BLOOD PRESSURE: 84 MMHG

## 2019-10-07 DIAGNOSIS — N40.0 BENIGN PROSTATIC HYPERPLASIA, UNSPECIFIED WHETHER LOWER URINARY TRACT SYMPTOMS PRESENT: Primary | ICD-10-CM

## 2019-10-07 LAB
BILIRUB SERPL-MCNC: NORMAL MG/DL
BLOOD URINE, POC: 50
COLOR, POC UA: YELLOW
GLUCOSE UR QL STRIP: NORMAL
KETONES UR QL STRIP: NORMAL
LEUKOCYTE ESTERASE URINE, POC: NORMAL
NITRITE, POC UA: NORMAL
PH, POC UA: 5
PROTEIN, POC: NORMAL
SPECIFIC GRAVITY, POC UA: 1.01
UROBILINOGEN, POC UA: NORMAL

## 2019-10-07 PROCEDURE — 99214 OFFICE O/P EST MOD 30 MIN: CPT | Mod: 25,HCNC,S$GLB, | Performed by: UROLOGY

## 2019-10-07 PROCEDURE — 3075F SYST BP GE 130 - 139MM HG: CPT | Mod: HCNC,CPTII,S$GLB, | Performed by: UROLOGY

## 2019-10-07 PROCEDURE — 3079F DIAST BP 80-89 MM HG: CPT | Mod: HCNC,CPTII,S$GLB, | Performed by: UROLOGY

## 2019-10-07 PROCEDURE — 3075F PR MOST RECENT SYSTOLIC BLOOD PRESS GE 130-139MM HG: ICD-10-PCS | Mod: HCNC,CPTII,S$GLB, | Performed by: UROLOGY

## 2019-10-07 PROCEDURE — 1101F PT FALLS ASSESS-DOCD LE1/YR: CPT | Mod: HCNC,CPTII,S$GLB, | Performed by: UROLOGY

## 2019-10-07 PROCEDURE — 99999 PR PBB SHADOW E&M-EST. PATIENT-LVL III: ICD-10-PCS | Mod: PBBFAC,HCNC,, | Performed by: UROLOGY

## 2019-10-07 PROCEDURE — 81002 URINALYSIS NONAUTO W/O SCOPE: CPT | Mod: HCNC,S$GLB,, | Performed by: UROLOGY

## 2019-10-07 PROCEDURE — 3079F PR MOST RECENT DIASTOLIC BLOOD PRESSURE 80-89 MM HG: ICD-10-PCS | Mod: HCNC,CPTII,S$GLB, | Performed by: UROLOGY

## 2019-10-07 PROCEDURE — 81002 POCT URINE DIPSTICK WITHOUT MICROSCOPE: ICD-10-PCS | Mod: HCNC,S$GLB,, | Performed by: UROLOGY

## 2019-10-07 PROCEDURE — 99999 PR PBB SHADOW E&M-EST. PATIENT-LVL III: CPT | Mod: PBBFAC,HCNC,, | Performed by: UROLOGY

## 2019-10-07 PROCEDURE — 1101F PR PT FALLS ASSESS DOC 0-1 FALLS W/OUT INJ PAST YR: ICD-10-PCS | Mod: HCNC,CPTII,S$GLB, | Performed by: UROLOGY

## 2019-10-07 PROCEDURE — 99214 PR OFFICE/OUTPT VISIT, EST, LEVL IV, 30-39 MIN: ICD-10-PCS | Mod: 25,HCNC,S$GLB, | Performed by: UROLOGY

## 2019-10-07 RX ORDER — TAMSULOSIN HYDROCHLORIDE 0.4 MG/1
0.4 CAPSULE ORAL DAILY
Qty: 90 CAPSULE | Refills: 3 | Status: SHIPPED | OUTPATIENT
Start: 2019-10-07 | End: 2020-03-26 | Stop reason: SDUPTHER
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
## 2019-10-08 ENCOUNTER — TELEPHONE (OUTPATIENT)
Dept: NEUROLOGY | Facility: CLINIC | Age: 84
End: 2019-10-08

## 2019-10-08 LAB
BACTERIA BLD CULT: NORMAL
BACTERIA BLD CULT: NORMAL

## 2019-10-08 NOTE — TELEPHONE ENCOUNTER
Spoke with pt's wife, clarified that Dr. Guevara did call in [facility] for pt on yesterday. She verbalized understanding.

## 2019-10-08 NOTE — TELEPHONE ENCOUNTER
----- Message from Kenyatta Patel sent at 10/8/2019  9:40 AM CDT -----  Contact: Lisa/pt spouse   Call caller in regards to pt med that was sent to the pharmacy on yesterday.   [phone] (home)

## 2019-11-14 RX ORDER — LOSARTAN POTASSIUM 25 MG/1
TABLET ORAL
Qty: 90 TABLET | Refills: 4 | Status: SHIPPED | OUTPATIENT
Start: 2019-11-14 | End: 2020-07-08 | Stop reason: CLARIF

## 2019-11-14 RX ORDER — AMLODIPINE BESYLATE 5 MG/1
TABLET ORAL
Qty: 135 TABLET | Refills: 4 | Status: SHIPPED | OUTPATIENT
Start: 2019-11-14 | End: 2020-07-08 | Stop reason: CLARIF

## 2019-11-24 RX ORDER — IBANDRONATE SODIUM 150 MG/1
TABLET, FILM COATED ORAL
Qty: 3 TABLET | Refills: 1 | Status: SHIPPED | OUTPATIENT
Start: 2019-11-24 | End: 2020-12-01

## 2019-12-31 DIAGNOSIS — I10 ESSENTIAL HYPERTENSION: Chronic | ICD-10-CM

## 2019-12-31 RX ORDER — LOSARTAN POTASSIUM 50 MG/1
50 TABLET ORAL DAILY
Qty: 90 TABLET | Refills: 3 | Status: SHIPPED | OUTPATIENT
Start: 2019-12-31 | End: 2020-01-03 | Stop reason: SDUPTHER

## 2020-01-03 DIAGNOSIS — I10 ESSENTIAL HYPERTENSION: Chronic | ICD-10-CM

## 2020-01-03 RX ORDER — LOSARTAN POTASSIUM 50 MG/1
50 TABLET ORAL DAILY
Qty: 90 TABLET | Refills: 3 | Status: SHIPPED | OUTPATIENT
Start: 2020-01-03 | End: 2020-01-09 | Stop reason: SDUPTHER

## 2020-01-09 ENCOUNTER — TELEPHONE (OUTPATIENT)
Dept: CARDIOLOGY | Facility: CLINIC | Age: 85
End: 2020-01-09

## 2020-01-09 DIAGNOSIS — I10 ESSENTIAL HYPERTENSION: Chronic | ICD-10-CM

## 2020-01-09 RX ORDER — LOSARTAN POTASSIUM 50 MG/1
50 TABLET ORAL DAILY
Qty: 90 TABLET | Refills: 3 | Status: SHIPPED | OUTPATIENT
Start: 2020-01-09 | End: 2020-12-01

## 2020-01-21 ENCOUNTER — CLINICAL SUPPORT (OUTPATIENT)
Dept: CARDIOLOGY | Facility: CLINIC | Age: 85
End: 2020-01-21
Attending: INTERNAL MEDICINE
Payer: MEDICARE

## 2020-01-21 DIAGNOSIS — I65.23 BILATERAL CAROTID ARTERY STENOSIS: Chronic | ICD-10-CM

## 2020-01-21 LAB — INTERNAL CAROTID STENOSIS: ABNORMAL

## 2020-01-21 PROCEDURE — 93880 EXTRACRANIAL BILAT STUDY: CPT | Mod: HCNC,S$GLB,, | Performed by: INTERNAL MEDICINE

## 2020-01-21 PROCEDURE — 93880 CAR US DOPPLER CAROTID BILATERAL: ICD-10-PCS | Mod: HCNC,S$GLB,, | Performed by: INTERNAL MEDICINE

## 2020-01-27 ENCOUNTER — OFFICE VISIT (OUTPATIENT)
Dept: CARDIOLOGY | Facility: CLINIC | Age: 85
End: 2020-01-27
Payer: MEDICARE

## 2020-01-27 VITALS
OXYGEN SATURATION: 96 % | BODY MASS INDEX: 22.95 KG/M2 | WEIGHT: 151.44 LBS | DIASTOLIC BLOOD PRESSURE: 76 MMHG | HEIGHT: 68 IN | SYSTOLIC BLOOD PRESSURE: 108 MMHG | HEART RATE: 87 BPM

## 2020-01-27 DIAGNOSIS — I49.3 PVC'S (PREMATURE VENTRICULAR CONTRACTIONS): ICD-10-CM

## 2020-01-27 DIAGNOSIS — Z79.01 CHRONIC ANTICOAGULATION: Chronic | ICD-10-CM

## 2020-01-27 DIAGNOSIS — E78.2 MIXED HYPERLIPIDEMIA: Chronic | ICD-10-CM

## 2020-01-27 DIAGNOSIS — I25.118 CORONARY ARTERY DISEASE OF NATIVE HEART WITH STABLE ANGINA PECTORIS, UNSPECIFIED VESSEL OR LESION TYPE: Chronic | ICD-10-CM

## 2020-01-27 DIAGNOSIS — I27.9 PULMONARY HEART DISEASE: ICD-10-CM

## 2020-01-27 DIAGNOSIS — I27.20 PULMONARY HYPERTENSION: ICD-10-CM

## 2020-01-27 DIAGNOSIS — G47.33 OSA (OBSTRUCTIVE SLEEP APNEA): Chronic | ICD-10-CM

## 2020-01-27 DIAGNOSIS — Z86.73 HISTORY OF CVA (CEREBROVASCULAR ACCIDENT): ICD-10-CM

## 2020-01-27 DIAGNOSIS — I25.9 CHRONIC ISCHEMIC HEART DISEASE: ICD-10-CM

## 2020-01-27 DIAGNOSIS — I34.0 NON-RHEUMATIC MITRAL REGURGITATION: ICD-10-CM

## 2020-01-27 DIAGNOSIS — I65.23 BILATERAL CAROTID ARTERY STENOSIS: Chronic | ICD-10-CM

## 2020-01-27 DIAGNOSIS — I36.1 NONRHEUMATIC TRICUSPID VALVE REGURGITATION: Primary | ICD-10-CM

## 2020-01-27 DIAGNOSIS — I70.8 AORTO-ILIAC ATHEROSCLEROSIS: Chronic | ICD-10-CM

## 2020-01-27 DIAGNOSIS — I48.20 ATRIAL FIBRILLATION, CHRONIC: Chronic | ICD-10-CM

## 2020-01-27 DIAGNOSIS — I10 ESSENTIAL HYPERTENSION: Chronic | ICD-10-CM

## 2020-01-27 DIAGNOSIS — I70.0 AORTO-ILIAC ATHEROSCLEROSIS: Chronic | ICD-10-CM

## 2020-01-27 DIAGNOSIS — R94.31 ABNORMAL ECG: ICD-10-CM

## 2020-01-27 PROCEDURE — 99214 OFFICE O/P EST MOD 30 MIN: CPT | Mod: HCNC,S$GLB,, | Performed by: INTERNAL MEDICINE

## 2020-01-27 PROCEDURE — 1159F MED LIST DOCD IN RCRD: CPT | Mod: HCNC,S$GLB,, | Performed by: INTERNAL MEDICINE

## 2020-01-27 PROCEDURE — 99999 PR PBB SHADOW E&M-EST. PATIENT-LVL III: CPT | Mod: PBBFAC,HCNC,, | Performed by: INTERNAL MEDICINE

## 2020-01-27 PROCEDURE — 99999 PR PBB SHADOW E&M-EST. PATIENT-LVL III: ICD-10-PCS | Mod: PBBFAC,HCNC,, | Performed by: INTERNAL MEDICINE

## 2020-01-27 PROCEDURE — 3078F PR MOST RECENT DIASTOLIC BLOOD PRESSURE < 80 MM HG: ICD-10-PCS | Mod: HCNC,CPTII,S$GLB, | Performed by: INTERNAL MEDICINE

## 2020-01-27 PROCEDURE — 1159F PR MEDICATION LIST DOCUMENTED IN MEDICAL RECORD: ICD-10-PCS | Mod: HCNC,S$GLB,, | Performed by: INTERNAL MEDICINE

## 2020-01-27 PROCEDURE — 3074F PR MOST RECENT SYSTOLIC BLOOD PRESSURE < 130 MM HG: ICD-10-PCS | Mod: HCNC,CPTII,S$GLB, | Performed by: INTERNAL MEDICINE

## 2020-01-27 PROCEDURE — 1101F PT FALLS ASSESS-DOCD LE1/YR: CPT | Mod: HCNC,CPTII,S$GLB, | Performed by: INTERNAL MEDICINE

## 2020-01-27 PROCEDURE — 99499 RISK ADDL DX/OHS AUDIT: ICD-10-PCS | Mod: HCNC,S$GLB,, | Performed by: INTERNAL MEDICINE

## 2020-01-27 PROCEDURE — 99214 PR OFFICE/OUTPT VISIT, EST, LEVL IV, 30-39 MIN: ICD-10-PCS | Mod: HCNC,S$GLB,, | Performed by: INTERNAL MEDICINE

## 2020-01-27 PROCEDURE — 99499 UNLISTED E&M SERVICE: CPT | Mod: HCNC,S$GLB,, | Performed by: INTERNAL MEDICINE

## 2020-01-27 PROCEDURE — 3074F SYST BP LT 130 MM HG: CPT | Mod: HCNC,CPTII,S$GLB, | Performed by: INTERNAL MEDICINE

## 2020-01-27 PROCEDURE — 1101F PR PT FALLS ASSESS DOC 0-1 FALLS W/OUT INJ PAST YR: ICD-10-PCS | Mod: HCNC,CPTII,S$GLB, | Performed by: INTERNAL MEDICINE

## 2020-01-27 PROCEDURE — 3078F DIAST BP <80 MM HG: CPT | Mod: HCNC,CPTII,S$GLB, | Performed by: INTERNAL MEDICINE

## 2020-01-27 NOTE — PROGRESS NOTES
Subjective:    Patient ID:  Lexa Hammond is a 84 y.o. male who presents for evaluation of Hypertension; Hyperlipidemia; Coronary Artery Disease; Carotid Artery Disease; Risk Factor Management For Atherosclerosis; Atrial Fibrillation; and Peripheral Arterial Disease      DAMIEN Hammond returns for f/u.   His current medical conditions include HTN, LVH, CAD, LAE, chronic atrial fibrillation, PHTN, MR, TR, PAD, carotid artery disease. Nonsmoker.  Past hx pertinent for following:  S/p CVA 2002. He had a R CEA by Dr. Angeles 12/09.   S/p  LHC 7/11 showed calcified CAD. Medical mgt recommended.   He did not tolerate Ranexa (rash). He has been intolerant of many meds to include many statins. Did not tolerate Imdur with headaches. He also stopped Metoprolol and Pravastatin for dizziness.   Negative stress MPI June 2016.  Echo 10/17 normal EF, mod MR, mod TR, PHTN 47 mmHg.  ecg 7/19 a fib with controlled VR, LAE, possible old septal infarct.  Echo 7/19 normal EF, LAE, LVH, mild-mod TR/MR, mild PHTN, mild enlarged ascending aorta.  Now here.  ecg 10/2/19 a fib with controlled VR, chronic septal infarct, left axis deviation.  HTN controlled on current meds.  CAD is stable.  He is not having any active anginal sxs on current medical tx.  Denies chest pain sxs.  No CHF sxs.  Carotid disease is stable.  No TIA/CVA sxs.  Carotid u/s Jan 2020 LICA 50 - 59%, TREVOR 20 - 39%.  A fib stable.  No dizziness or falls.  No abnl bleeding on Xarelto.  Compliant with meds.  Lipids controlled.  No claudication sxs.  Does not use CPAP for ZACK.    Current Outpatient Medications:     amLODIPine (NORVASC) 10 MG tablet, Take 1 tablet (10 mg total) by mouth once daily., Disp: 90 tablet, Rfl: 1    amLODIPine (NORVASC) 5 MG tablet, TAKE 1 AND 1/2 TABLETS EVERY DAY, Disp: 135 tablet, Rfl: 4    aspirin (ECOTRIN) 81 MG EC tablet, Take 81 mg by mouth once daily., Disp: , Rfl:     betamethasone valerate 0.1% (VALISONE) 0.1 % Oint, Apply topically 2  "(two) times daily., Disp: 45 g, Rfl: 0    blood pressure monitor Kit, , Disp: , Rfl:     co-enzyme Q-10 30 mg capsule, Take 30 mg by mouth once daily. , Disp: , Rfl:     hydrALAZINE (APRESOLINE) 25 MG tablet, TAKE 1 TABLET(25 MG) BY MOUTH THREE TIMES DAILY, Disp: 270 tablet, Rfl: 1    ibandronate (BONIVA) 150 mg tablet, TAKE 1 TABLET BY MOUTH EVERY 30 DAYS AS DIRECTED, Disp: 3 tablet, Rfl: 1    incontinence pad, liner, disp Pads, , Disp: , Rfl:     losartan (COZAAR) 25 MG tablet, TAKE 1 TABLET EVERY DAY, Disp: 90 tablet, Rfl: 4    losartan (COZAAR) 50 MG tablet, Take 1 tablet (50 mg total) by mouth once daily., Disp: 90 tablet, Rfl: 3    multivitamin with minerals tablet, Take 1 tablet by mouth once daily., Disp: , Rfl:     polyethylene glycol (GLYCOLAX) 17 gram PwPk, Take 17 g by mouth once daily., Disp: 10 each, Rfl: 0    rivaroxaban (XARELTO) 20 mg Tab, Take 1 tablet (20 mg total) by mouth once daily., Disp: 30 tablet, Rfl: 12    tamsulosin (FLOMAX) 0.4 mg Cap, Take 1 capsule (0.4 mg total) by mouth once daily., Disp: 90 capsule, Rfl: 3    VITAMIN D3 5,000 unit Tab, 5,000 Units once daily. , Disp: , Rfl:       Review of Systems   Constitution: Negative.   HENT: Negative.    Eyes: Negative.    Cardiovascular: Negative.    Respiratory: Negative.    Endocrine: Negative.    Hematologic/Lymphatic: Negative.    Skin: Negative.    Musculoskeletal: Positive for arthritis and joint pain.   Gastrointestinal: Negative.    Genitourinary: Negative.    Neurological: Negative.    Psychiatric/Behavioral: Negative.    Allergic/Immunologic: Negative.        /76 (BP Location: Left arm, Patient Position: Sitting, BP Method: Large (Manual))   Pulse 87   Ht 5' 8" (1.727 m)   Wt 68.7 kg (151 lb 7.3 oz)   SpO2 96%   BMI 23.03 kg/m²     Wt Readings from Last 3 Encounters:   01/27/20 68.7 kg (151 lb 7.3 oz)   10/07/19 70 kg (154 lb 5.2 oz)   10/02/19 70 kg (154 lb 5.2 oz)     Temp Readings from Last 3 Encounters: "   10/02/19 99 °F (37.2 °C) (Oral)   10/02/19 99.1 °F (37.3 °C) (Tympanic)   09/27/19 97 °F (36.1 °C) (Tympanic)     BP Readings from Last 3 Encounters:   01/27/20 108/76   10/07/19 136/84   10/02/19 110/64     Pulse Readings from Last 3 Encounters:   01/27/20 87   10/02/19 87   10/02/19 105          Objective:    Physical Exam   Constitutional: He is oriented to person, place, and time. He appears well-developed and well-nourished.   HENT:   Head: Normocephalic.   Neck: Normal range of motion. Neck supple. Normal carotid pulses, no hepatojugular reflux and no JVD present. Carotid bruit is not present. No thyromegaly present.   Cardiovascular: Normal rate, S1 normal and S2 normal. An irregularly irregular rhythm present. PMI is not displaced. Exam reveals no S3, no S4, no distant heart sounds, no friction rub, no midsystolic click and no opening snap.   No murmur heard.  Pulses:       Radial pulses are 2+ on the right side, and 2+ on the left side.   Pulmonary/Chest: Effort normal and breath sounds normal. He has no wheezes. He has no rales.   Abdominal: Soft. Bowel sounds are normal. He exhibits no distension, no abdominal bruit, no ascites and no mass. There is no tenderness.   Musculoskeletal: He exhibits no edema.   Neurological: He is alert and oriented to person, place, and time.   Skin: Skin is warm.   Psychiatric: He has a normal mood and affect. His behavior is normal.   Nursing note and vitals reviewed.      I have reviewed all pertinent labs and cardiac studies.      Chemistry        Component Value Date/Time     (L) 10/02/2019 1822    K 4.0 10/02/2019 1822     10/02/2019 1822    CO2 24 10/02/2019 1822    BUN 32 (H) 10/02/2019 1822    CREATININE 1.2 10/02/2019 1822     (H) 10/02/2019 1822        Component Value Date/Time    CALCIUM 8.3 (L) 10/02/2019 1822    ALKPHOS 81 10/02/2019 1822    AST 23 10/02/2019 1822    ALT 10 10/02/2019 1822    BILITOT 1.1 (H) 10/02/2019 1822    REAL  >60 10/02/2019 1822    EGFRNONAA 56 (A) 10/02/2019 1822        Lab Results   Component Value Date    WBC 19.86 (H) 10/02/2019    HGB 10.4 (L) 10/02/2019    HCT 32.0 (L) 10/02/2019    MCV 94 10/02/2019     10/02/2019       Lab Results   Component Value Date    HGBA1C 5.9 10/18/2012     Lab Results   Component Value Date    CHOL 156 07/18/2019    CHOL 159 05/31/2019    CHOL 188 04/09/2018     Lab Results   Component Value Date    HDL 60 07/18/2019    HDL 61 05/31/2019    HDL 72 04/09/2018     Lab Results   Component Value Date    LDLCALC 87.6 07/18/2019    LDLCALC 90.2 05/31/2019    LDLCALC 104.6 04/09/2018     Lab Results   Component Value Date    TRIG 42 07/18/2019    TRIG 39 05/31/2019    TRIG 57 04/09/2018     Lab Results   Component Value Date    CHOLHDL 38.5 07/18/2019    CHOLHDL 38.4 05/31/2019    CHOLHDL 38.3 04/09/2018     RIGHT  The right Proximal Common Carotid Artery is visualized.   The right carotid bulb artery is visualized.   The right Distal Internal Carotid Artery has 20 - 39% stenosis.   There is acceleration in the right external carotid artery, associated with tortuosity.   The right vertebral artery is visualized, associated with anterograde flow.   The right ICA/CCA ratio is: 2.28    LEFT  There is acceleration in the left Proximal Common Carotid Artery.   The left carotid bulb artery is visualized.   The left Proximal Internal Carotid Artery has 50 - 59% stenosis, associated with heterogeneous plaque.   There is acceleration in the left external carotid artery.   The left vertebral artery is visualized, associated with anterograde flow.   The left ICA/CCA ratio is: 2.07      CONCLUSIONS   There is 20 - 39% right Internal Carotid stenosis.  There is 50 - 59% left Internal Carotid stenosis.          This document has been electronically    SIGNED BY: Joel Colunga MD On: 01/21/2020 19:14      Assessment:       1. Nonrheumatic tricuspid valve regurgitation    2. PVC's (premature ventricular  contractions)    3. Pulmonary hypertension    4. Pulmonary heart disease    5. ZACK (obstructive sleep apnea)    6. Abnormal ECG    7. Aorto-iliac atherosclerosis    8. Atrial fibrillation, chronic    9. Bilateral carotid artery stenosis    10. Chronic anticoagulation    11. Chronic ischemic heart disease    12. Coronary artery disease of native heart with stable angina pectoris, unspecified vessel or lesion type    13. Essential hypertension    14. History of CVA (cerebrovascular accident)    15. Mixed hyperlipidemia    16. Non-rheumatic mitral regurgitation         Plan:             Stable cardiovascular conditions at present time on current medical treatment.  Reviewed all tests and above medical conditions with patient in detail and formulated treatment plan.  Continue optimal medical treatment for cardiovascular conditions.  Cardiac low salt diet discussed.  Daily exercise encouraged, goal 30 +  minutes aerobic exercise as tolerated.  Maintaining healthy weight and weight loss goals (if needed) were discussed in clinic.  No changes in meds today.  Continue Xarelto for CVA protection.  F/u in 6 months with carotid u/s.

## 2020-03-18 DIAGNOSIS — I48.20 CHRONIC ATRIAL FIBRILLATION: ICD-10-CM

## 2020-03-18 DIAGNOSIS — I48.20 ATRIAL FIBRILLATION, CHRONIC: Chronic | ICD-10-CM

## 2020-03-26 NOTE — TELEPHONE ENCOUNTER
Spoke to [facility] mail order pharmacy, they stated that they needed another order for the Tamsulosin to be able to fill. Can you please e-scribe?

## 2020-03-26 NOTE — TELEPHONE ENCOUNTER
----- Message from Suleiman Silva sent at 3/25/2020  4:55 PM CDT -----  Contact: [facility]   Type:  Pharmacy Calling to Clarify an RX    Name of Caller: [facility]   [facility]   Prescription Name: tamsulosin (FLOMAX) 0.4 mg Cap  What do they need to clarify?: Rx refill approval   Best Call Back Number: [phone]   Additional Information: n/a

## 2020-03-27 RX ORDER — TAMSULOSIN HYDROCHLORIDE 0.4 MG/1
0.4 CAPSULE ORAL DAILY
Qty: 90 CAPSULE | Refills: 3 | Status: SHIPPED | OUTPATIENT
Start: 2020-03-27 | End: 2020-11-12 | Stop reason: ALTCHOICE

## 2020-06-01 ENCOUNTER — OFFICE VISIT (OUTPATIENT)
Dept: INTERNAL MEDICINE | Facility: CLINIC | Age: 85
End: 2020-06-01
Payer: MEDICARE

## 2020-06-01 VITALS
TEMPERATURE: 97 F | HEIGHT: 68 IN | OXYGEN SATURATION: 99 % | DIASTOLIC BLOOD PRESSURE: 89 MMHG | HEART RATE: 95 BPM | BODY MASS INDEX: 23.82 KG/M2 | SYSTOLIC BLOOD PRESSURE: 122 MMHG | WEIGHT: 157.19 LBS

## 2020-06-01 DIAGNOSIS — M46.96 INFLAMMATORY SPONDYLOPATHY OF LUMBAR REGION: ICD-10-CM

## 2020-06-01 DIAGNOSIS — I10 ESSENTIAL HYPERTENSION: Primary | Chronic | ICD-10-CM

## 2020-06-01 DIAGNOSIS — J84.10 CALCIFIED GRANULOMA OF LUNG: ICD-10-CM

## 2020-06-01 DIAGNOSIS — F32.A DEPRESSION, UNSPECIFIED DEPRESSION TYPE: ICD-10-CM

## 2020-06-01 PROCEDURE — 3074F PR MOST RECENT SYSTOLIC BLOOD PRESSURE < 130 MM HG: ICD-10-PCS | Mod: HCNC,CPTII,S$GLB, | Performed by: FAMILY MEDICINE

## 2020-06-01 PROCEDURE — 99214 OFFICE O/P EST MOD 30 MIN: CPT | Mod: HCNC,S$GLB,, | Performed by: FAMILY MEDICINE

## 2020-06-01 PROCEDURE — 99999 PR PBB SHADOW E&M-EST. PATIENT-LVL III: ICD-10-PCS | Mod: PBBFAC,HCNC,, | Performed by: FAMILY MEDICINE

## 2020-06-01 PROCEDURE — 99499 RISK ADDL DX/OHS AUDIT: ICD-10-PCS | Mod: HCNC,S$GLB,, | Performed by: FAMILY MEDICINE

## 2020-06-01 PROCEDURE — 3079F PR MOST RECENT DIASTOLIC BLOOD PRESSURE 80-89 MM HG: ICD-10-PCS | Mod: HCNC,CPTII,S$GLB, | Performed by: FAMILY MEDICINE

## 2020-06-01 PROCEDURE — 1101F PT FALLS ASSESS-DOCD LE1/YR: CPT | Mod: HCNC,CPTII,S$GLB, | Performed by: FAMILY MEDICINE

## 2020-06-01 PROCEDURE — 1125F PR PAIN SEVERITY QUANTIFIED, PAIN PRESENT: ICD-10-PCS | Mod: HCNC,S$GLB,, | Performed by: FAMILY MEDICINE

## 2020-06-01 PROCEDURE — 1125F AMNT PAIN NOTED PAIN PRSNT: CPT | Mod: HCNC,S$GLB,, | Performed by: FAMILY MEDICINE

## 2020-06-01 PROCEDURE — 99499 UNLISTED E&M SERVICE: CPT | Mod: HCNC,S$GLB,, | Performed by: FAMILY MEDICINE

## 2020-06-01 PROCEDURE — 99999 PR PBB SHADOW E&M-EST. PATIENT-LVL III: CPT | Mod: PBBFAC,HCNC,, | Performed by: FAMILY MEDICINE

## 2020-06-01 PROCEDURE — 3079F DIAST BP 80-89 MM HG: CPT | Mod: HCNC,CPTII,S$GLB, | Performed by: FAMILY MEDICINE

## 2020-06-01 PROCEDURE — 99214 PR OFFICE/OUTPT VISIT, EST, LEVL IV, 30-39 MIN: ICD-10-PCS | Mod: HCNC,S$GLB,, | Performed by: FAMILY MEDICINE

## 2020-06-01 PROCEDURE — 1101F PR PT FALLS ASSESS DOC 0-1 FALLS W/OUT INJ PAST YR: ICD-10-PCS | Mod: HCNC,CPTII,S$GLB, | Performed by: FAMILY MEDICINE

## 2020-06-01 PROCEDURE — 3074F SYST BP LT 130 MM HG: CPT | Mod: HCNC,CPTII,S$GLB, | Performed by: FAMILY MEDICINE

## 2020-06-01 PROCEDURE — 1159F PR MEDICATION LIST DOCUMENTED IN MEDICAL RECORD: ICD-10-PCS | Mod: HCNC,S$GLB,, | Performed by: FAMILY MEDICINE

## 2020-06-01 PROCEDURE — 1159F MED LIST DOCD IN RCRD: CPT | Mod: HCNC,S$GLB,, | Performed by: FAMILY MEDICINE

## 2020-06-01 NOTE — PROGRESS NOTES
Subjective:       Patient ID: Lexa Hammond is a 84 y.o. male.    Chief Complaint: Annual Exam    Pt is a 84 year old who is here for his 6 month follow-up. Pt fell about 1 week ago and hurt his left lower back thought it was his hip at first.     Review of Systems   Constitutional: Negative.    Respiratory: Negative.    Cardiovascular: Negative.    Genitourinary: Negative.    Musculoskeletal: Negative.    Neurological: Negative.    Hematological: Negative.        Objective:      Physical Exam   Constitutional: He is oriented to person, place, and time. He appears well-developed and well-nourished.   Cardiovascular: Normal rate and regular rhythm. Exam reveals no friction rub.   No murmur heard.  Pulmonary/Chest: Effort normal and breath sounds normal.   Musculoskeletal:        Left hip: He exhibits normal range of motion, normal strength and no deformity.   Neurological: He is alert and oriented to person, place, and time.       Assessment:       1. Essential hypertension    2. Depression, unspecified depression type    3. Inflammatory spondylopathy of lumbar region    4. Calcified granuloma of lung        Plan:       Essential hypertension  Comments:  Pt BP is well controlled    Depression, unspecified depression type  Comments:  Stable    Inflammatory spondylopathy of lumbar region  Comments:  Stable    Calcified granuloma of lung  Comments:  stable

## 2020-06-17 ENCOUNTER — TELEPHONE (OUTPATIENT)
Dept: CARDIOLOGY | Facility: CLINIC | Age: 85
End: 2020-06-17

## 2020-06-17 NOTE — TELEPHONE ENCOUNTER
----- Message from Debbie Archer sent at 6/17/2020  9:25 AM CDT -----  Regarding: appt  Contact: wife Lisa  Calling concerning getting appointment on the same day with wife please. Please call patient @ [phone]. thanks

## 2020-06-17 NOTE — TELEPHONE ENCOUNTER
Spoke with dena and both appointments scheduled for November 13th as requested - pt verbalized understanding

## 2020-06-22 ENCOUNTER — TELEPHONE (OUTPATIENT)
Dept: INTERNAL MEDICINE | Facility: CLINIC | Age: 85
End: 2020-06-22

## 2020-06-22 NOTE — TELEPHONE ENCOUNTER
Patient requesting orders for PT or the shuffling.  Order to [facility] on [address] in [place],

## 2020-06-22 NOTE — TELEPHONE ENCOUNTER
----- Message from Sandy Jewell sent at 6/22/2020  2:21 PM CDT -----  Regarding: Orders  Contact: Pt spouse  Caller called in regards to speaking with the staff regarding orders for therapy to be sent to [facility]. Caller can be reached at [phone] ([email or web address])

## 2020-06-25 ENCOUNTER — TELEPHONE (OUTPATIENT)
Dept: INTERNAL MEDICINE | Facility: CLINIC | Age: 85
End: 2020-06-25

## 2020-06-29 DIAGNOSIS — R26.9 PROGRESSIVE GAIT DISORDER: ICD-10-CM

## 2020-06-29 DIAGNOSIS — Z86.73 HISTORY OF CVA (CEREBROVASCULAR ACCIDENT): Primary | ICD-10-CM

## 2020-07-07 ENCOUNTER — PATIENT OUTREACH (OUTPATIENT)
Dept: ADMINISTRATIVE | Facility: OTHER | Age: 85
End: 2020-07-07

## 2020-07-07 NOTE — PROGRESS NOTES
Requested updates within Care Everywhere.  Patient's chart was reviewed for overdue AMOS topics.  Immunizations reconciled.

## 2020-07-08 ENCOUNTER — HOSPITAL ENCOUNTER (OUTPATIENT)
Dept: RADIOLOGY | Facility: HOSPITAL | Age: 85
Discharge: HOME | End: 2020-07-08
Attending: INTERNAL MEDICINE
Payer: MEDICARE

## 2020-07-08 ENCOUNTER — OFFICE VISIT (OUTPATIENT)
Dept: PODIATRY | Facility: CLINIC | Age: 85
End: 2020-07-08
Payer: MEDICARE

## 2020-07-08 ENCOUNTER — TELEPHONE (OUTPATIENT)
Dept: CARDIOLOGY | Facility: CLINIC | Age: 85
End: 2020-07-08

## 2020-07-08 ENCOUNTER — OFFICE VISIT (OUTPATIENT)
Dept: CARDIOLOGY | Facility: CLINIC | Age: 85
End: 2020-07-08
Payer: MEDICARE

## 2020-07-08 VITALS
BODY MASS INDEX: 25.56 KG/M2 | HEIGHT: 68 IN | DIASTOLIC BLOOD PRESSURE: 88 MMHG | OXYGEN SATURATION: 95 % | WEIGHT: 168.63 LBS | HEART RATE: 98 BPM | SYSTOLIC BLOOD PRESSURE: 130 MMHG

## 2020-07-08 VITALS
HEART RATE: 94 BPM | SYSTOLIC BLOOD PRESSURE: 134 MMHG | BODY MASS INDEX: 23.72 KG/M2 | DIASTOLIC BLOOD PRESSURE: 81 MMHG | WEIGHT: 156.5 LBS | RESPIRATION RATE: 17 BRPM | HEIGHT: 68 IN

## 2020-07-08 DIAGNOSIS — Z79.01 ON CONTINUOUS ORAL ANTICOAGULATION: ICD-10-CM

## 2020-07-08 DIAGNOSIS — R05.9 COUGH: ICD-10-CM

## 2020-07-08 DIAGNOSIS — I70.8 AORTO-ILIAC ATHEROSCLEROSIS: Chronic | ICD-10-CM

## 2020-07-08 DIAGNOSIS — I27.20 PULMONARY HYPERTENSION: ICD-10-CM

## 2020-07-08 DIAGNOSIS — B35.1 ONYCHOMYCOSIS: ICD-10-CM

## 2020-07-08 DIAGNOSIS — Z79.01 CHRONIC ANTICOAGULATION: ICD-10-CM

## 2020-07-08 DIAGNOSIS — I87.2 VENOUS STASIS DERMATITIS OF BOTH LOWER EXTREMITIES: ICD-10-CM

## 2020-07-08 DIAGNOSIS — Z86.73 HISTORY OF CVA (CEREBROVASCULAR ACCIDENT): ICD-10-CM

## 2020-07-08 DIAGNOSIS — I10 ESSENTIAL HYPERTENSION: Chronic | ICD-10-CM

## 2020-07-08 DIAGNOSIS — I49.3 PVC'S (PREMATURE VENTRICULAR CONTRACTIONS): ICD-10-CM

## 2020-07-08 DIAGNOSIS — Z79.01 CHRONIC ANTICOAGULATION: Chronic | ICD-10-CM

## 2020-07-08 DIAGNOSIS — I73.9 ARTERIAL INSUFFICIENCY OF LOWER EXTREMITY: ICD-10-CM

## 2020-07-08 DIAGNOSIS — I25.9 CHRONIC ISCHEMIC HEART DISEASE: ICD-10-CM

## 2020-07-08 DIAGNOSIS — I34.0 NON-RHEUMATIC MITRAL REGURGITATION: ICD-10-CM

## 2020-07-08 DIAGNOSIS — I25.118 CORONARY ARTERY DISEASE OF NATIVE HEART WITH STABLE ANGINA PECTORIS, UNSPECIFIED VESSEL OR LESION TYPE: ICD-10-CM

## 2020-07-08 DIAGNOSIS — I48.20 ATRIAL FIBRILLATION, CHRONIC: Chronic | ICD-10-CM

## 2020-07-08 DIAGNOSIS — R94.31 ABNORMAL ECG: ICD-10-CM

## 2020-07-08 DIAGNOSIS — G47.33 OSA (OBSTRUCTIVE SLEEP APNEA): ICD-10-CM

## 2020-07-08 DIAGNOSIS — I27.9 PULMONARY HEART DISEASE: ICD-10-CM

## 2020-07-08 DIAGNOSIS — R06.09 DOE (DYSPNEA ON EXERTION): ICD-10-CM

## 2020-07-08 DIAGNOSIS — R06.09 DOE (DYSPNEA ON EXERTION): Primary | ICD-10-CM

## 2020-07-08 DIAGNOSIS — I65.23 BILATERAL CAROTID ARTERY STENOSIS: Chronic | ICD-10-CM

## 2020-07-08 DIAGNOSIS — I65.23 BILATERAL CAROTID ARTERY STENOSIS: ICD-10-CM

## 2020-07-08 DIAGNOSIS — I70.8 AORTO-ILIAC ATHEROSCLEROSIS: ICD-10-CM

## 2020-07-08 DIAGNOSIS — I36.1 NONRHEUMATIC TRICUSPID VALVE REGURGITATION: ICD-10-CM

## 2020-07-08 DIAGNOSIS — I10 ESSENTIAL HYPERTENSION: ICD-10-CM

## 2020-07-08 DIAGNOSIS — I87.2 VENOUS INSUFFICIENCY OF BOTH LOWER EXTREMITIES: Primary | ICD-10-CM

## 2020-07-08 DIAGNOSIS — I25.118 CORONARY ARTERY DISEASE OF NATIVE HEART WITH STABLE ANGINA PECTORIS, UNSPECIFIED VESSEL OR LESION TYPE: Chronic | ICD-10-CM

## 2020-07-08 DIAGNOSIS — I70.0 AORTO-ILIAC ATHEROSCLEROSIS: ICD-10-CM

## 2020-07-08 DIAGNOSIS — E78.2 MIXED HYPERLIPIDEMIA: Chronic | ICD-10-CM

## 2020-07-08 DIAGNOSIS — E78.2 MIXED HYPERLIPIDEMIA: ICD-10-CM

## 2020-07-08 DIAGNOSIS — R06.02 SHORTNESS OF BREATH: ICD-10-CM

## 2020-07-08 DIAGNOSIS — R60.0 EDEMA OF BOTH LEGS: ICD-10-CM

## 2020-07-08 DIAGNOSIS — I70.0 AORTO-ILIAC ATHEROSCLEROSIS: Chronic | ICD-10-CM

## 2020-07-08 DIAGNOSIS — G47.33 OSA (OBSTRUCTIVE SLEEP APNEA): Chronic | ICD-10-CM

## 2020-07-08 DIAGNOSIS — I48.20 ATRIAL FIBRILLATION, CHRONIC: ICD-10-CM

## 2020-07-08 DIAGNOSIS — I73.9 PERIPHERAL ARTERIAL DISEASE: ICD-10-CM

## 2020-07-08 PROCEDURE — 3075F PR MOST RECENT SYSTOLIC BLOOD PRESS GE 130-139MM HG: ICD-10-PCS | Mod: HCNC,CPTII,S$GLB, | Performed by: INTERNAL MEDICINE

## 2020-07-08 PROCEDURE — 99999 PR PBB SHADOW E&M-EST. PATIENT-LVL IV: CPT | Mod: PBBFAC,HCNC,, | Performed by: INTERNAL MEDICINE

## 2020-07-08 PROCEDURE — 99999 PR PBB SHADOW E&M-EST. PATIENT-LVL IV: CPT | Mod: PBBFAC,HCNC,, | Performed by: PODIATRIST

## 2020-07-08 PROCEDURE — 1126F AMNT PAIN NOTED NONE PRSNT: CPT | Mod: HCNC,S$GLB,, | Performed by: INTERNAL MEDICINE

## 2020-07-08 PROCEDURE — 99999 PR PBB SHADOW E&M-EST. PATIENT-LVL IV: ICD-10-PCS | Mod: PBBFAC,HCNC,, | Performed by: PODIATRIST

## 2020-07-08 PROCEDURE — 1159F MED LIST DOCD IN RCRD: CPT | Mod: HCNC,S$GLB,, | Performed by: INTERNAL MEDICINE

## 2020-07-08 PROCEDURE — 11721 PR DEBRIDEMENT OF NAILS, 6 OR MORE: ICD-10-PCS | Mod: Q8,HCNC,S$GLB, | Performed by: PODIATRIST

## 2020-07-08 PROCEDURE — 3288F PR FALLS RISK ASSESSMENT DOCUMENTED: ICD-10-PCS | Mod: HCNC,CPTII,S$GLB, | Performed by: PODIATRIST

## 2020-07-08 PROCEDURE — 1159F PR MEDICATION LIST DOCUMENTED IN MEDICAL RECORD: ICD-10-PCS | Mod: HCNC,S$GLB,, | Performed by: INTERNAL MEDICINE

## 2020-07-08 PROCEDURE — 99214 PR OFFICE/OUTPT VISIT, EST, LEVL IV, 30-39 MIN: ICD-10-PCS | Mod: 25,HCNC,S$GLB, | Performed by: PODIATRIST

## 2020-07-08 PROCEDURE — 3075F SYST BP GE 130 - 139MM HG: CPT | Mod: HCNC,CPTII,S$GLB, | Performed by: INTERNAL MEDICINE

## 2020-07-08 PROCEDURE — 3079F PR MOST RECENT DIASTOLIC BLOOD PRESSURE 80-89 MM HG: ICD-10-PCS | Mod: HCNC,CPTII,S$GLB, | Performed by: INTERNAL MEDICINE

## 2020-07-08 PROCEDURE — 1126F PR PAIN SEVERITY QUANTIFIED, NO PAIN PRESENT: ICD-10-PCS | Mod: HCNC,S$GLB,, | Performed by: INTERNAL MEDICINE

## 2020-07-08 PROCEDURE — 1159F PR MEDICATION LIST DOCUMENTED IN MEDICAL RECORD: ICD-10-PCS | Mod: HCNC,S$GLB,, | Performed by: PODIATRIST

## 2020-07-08 PROCEDURE — 99214 PR OFFICE/OUTPT VISIT, EST, LEVL IV, 30-39 MIN: ICD-10-PCS | Mod: HCNC,S$GLB,, | Performed by: INTERNAL MEDICINE

## 2020-07-08 PROCEDURE — 99214 OFFICE O/P EST MOD 30 MIN: CPT | Mod: 25,HCNC,S$GLB, | Performed by: PODIATRIST

## 2020-07-08 PROCEDURE — 3288F FALL RISK ASSESSMENT DOCD: CPT | Mod: HCNC,CPTII,S$GLB, | Performed by: INTERNAL MEDICINE

## 2020-07-08 PROCEDURE — 1100F PR PT FALLS ASSESS DOC 2+ FALLS/FALL W/INJURY/YR: ICD-10-PCS | Mod: HCNC,CPTII,S$GLB, | Performed by: INTERNAL MEDICINE

## 2020-07-08 PROCEDURE — 1159F MED LIST DOCD IN RCRD: CPT | Mod: HCNC,S$GLB,, | Performed by: PODIATRIST

## 2020-07-08 PROCEDURE — 3079F PR MOST RECENT DIASTOLIC BLOOD PRESSURE 80-89 MM HG: ICD-10-PCS | Mod: HCNC,CPTII,S$GLB, | Performed by: PODIATRIST

## 2020-07-08 PROCEDURE — 1100F PTFALLS ASSESS-DOCD GE2>/YR: CPT | Mod: HCNC,CPTII,S$GLB, | Performed by: INTERNAL MEDICINE

## 2020-07-08 PROCEDURE — 1100F PTFALLS ASSESS-DOCD GE2>/YR: CPT | Mod: HCNC,CPTII,S$GLB, | Performed by: PODIATRIST

## 2020-07-08 PROCEDURE — 3288F FALL RISK ASSESSMENT DOCD: CPT | Mod: HCNC,CPTII,S$GLB, | Performed by: PODIATRIST

## 2020-07-08 PROCEDURE — 3075F PR MOST RECENT SYSTOLIC BLOOD PRESS GE 130-139MM HG: ICD-10-PCS | Mod: HCNC,CPTII,S$GLB, | Performed by: PODIATRIST

## 2020-07-08 PROCEDURE — 71046 X-RAY EXAM CHEST 2 VIEWS: CPT | Mod: TC,HCNC

## 2020-07-08 PROCEDURE — 71046 X-RAY EXAM CHEST 2 VIEWS: CPT | Mod: 26,HCNC,, | Performed by: RADIOLOGY

## 2020-07-08 PROCEDURE — 3288F PR FALLS RISK ASSESSMENT DOCUMENTED: ICD-10-PCS | Mod: HCNC,CPTII,S$GLB, | Performed by: INTERNAL MEDICINE

## 2020-07-08 PROCEDURE — 99999 PR PBB SHADOW E&M-EST. PATIENT-LVL IV: ICD-10-PCS | Mod: PBBFAC,HCNC,, | Performed by: INTERNAL MEDICINE

## 2020-07-08 PROCEDURE — 71046 XR CHEST PA AND LATERAL: ICD-10-PCS | Mod: 26,HCNC,, | Performed by: RADIOLOGY

## 2020-07-08 PROCEDURE — 11721 DEBRIDE NAIL 6 OR MORE: CPT | Mod: Q8,HCNC,S$GLB, | Performed by: PODIATRIST

## 2020-07-08 PROCEDURE — 3079F DIAST BP 80-89 MM HG: CPT | Mod: HCNC,CPTII,S$GLB, | Performed by: INTERNAL MEDICINE

## 2020-07-08 PROCEDURE — 99214 OFFICE O/P EST MOD 30 MIN: CPT | Mod: HCNC,S$GLB,, | Performed by: INTERNAL MEDICINE

## 2020-07-08 PROCEDURE — 3075F SYST BP GE 130 - 139MM HG: CPT | Mod: HCNC,CPTII,S$GLB, | Performed by: PODIATRIST

## 2020-07-08 PROCEDURE — 1100F PR PT FALLS ASSESS DOC 2+ FALLS/FALL W/INJURY/YR: ICD-10-PCS | Mod: HCNC,CPTII,S$GLB, | Performed by: PODIATRIST

## 2020-07-08 PROCEDURE — 3079F DIAST BP 80-89 MM HG: CPT | Mod: HCNC,CPTII,S$GLB, | Performed by: PODIATRIST

## 2020-07-08 RX ORDER — FUROSEMIDE 20 MG/1
20 TABLET ORAL DAILY
Qty: 30 TABLET | Refills: 11 | Status: SHIPPED | OUTPATIENT
Start: 2020-07-08 | End: 2020-12-01

## 2020-07-08 RX ORDER — FUROSEMIDE 20 MG/1
20 TABLET ORAL DAILY
Qty: 30 TABLET | Refills: 11 | Status: SHIPPED | OUTPATIENT
Start: 2020-07-08 | End: 2020-07-08 | Stop reason: SDUPTHER

## 2020-07-08 NOTE — PROGRESS NOTES
Subjective:    Patient ID:  Lexa Hammond is a 84 y.o. male who presents for evaluation of Atrial Fibrillation, Shortness of Breath, Carotid Artery Disease, Hyperlipidemia, Hypertension, Coronary Artery Disease, and Peripheral Arterial Disease      HPI Manish returns for f/u.   His current medical conditions include HTN, LVH, CAD, LAE, chronic atrial fibrillation, PHTN, MR, TR, PAD, carotid artery disease. Nonsmoker.  Past hx pertinent for following:  S/p CVA 2002. He had a R CEA by Dr. Angeles 12/09.   S/p  LHC 7/11 showed calcified CAD. Medical mgt recommended.   He did not tolerate Ranexa (rash). He has been intolerant of many meds to include many statins. Did not tolerate Imdur with headaches. He also stopped Metoprolol and Pravastatin for dizziness.   Negative stress MPI June 2016.  Echo 10/17 normal EF, mod MR, mod TR, PHTN 47 mmHg.  ecg 7/19 a fib with controlled VR, LAE, possible old septal infarct.  Echo 7/19 normal EF, LAE, LVH, mild-mod TR/MR, mild PHTN, mild enlarged ascending aorta.  ecg 10/2/19 a fib with controlled VR, chronic septal infarct, left axis deviation.  Carotid u/s Jan 2020 LICA 50 - 59%, TREVOR 20 - 39%.  Now here.  He made appt today for coughing.  He has been having coughing, wheezing last few days.  Has had some ankle edema x 4 month.  Has superficial sore anterior shin.  No fevers.    No body aches.  No anginal sxs.  No claudication sxs.  BP is controlled.  No TIA/CVA sxs.  Compliant with meds.  Does not use CPAP, intolerant in past.          Current Outpatient Medications   Medication Instructions    amLODIPine (NORVASC) 10 mg, Oral, Daily    aspirin (ECOTRIN) 81 mg, Daily    betamethasone valerate 0.1% (VALISONE) 0.1 % Oint Topical (Top), 2 times daily    blood pressure monitor Kit No dose, route, or frequency recorded.    co-enzyme Q-10 30 mg, Oral, Daily    ibandronate (BONIVA) 150 mg tablet TAKE 1 TABLET BY MOUTH EVERY 30 DAYS AS DIRECTED    incontinence pad, liner,  "disp Pads No dose, route, or frequency recorded.    losartan (COZAAR) 50 mg, Oral, Daily    multivitamin with minerals tablet 1 tablet, Oral, Daily    polyethylene glycol (GLYCOLAX) 17 g, Oral, Daily    rivaroxaban (XARELTO) 20 mg, Oral, Daily    tamsulosin (FLOMAX) 0.4 mg, Oral, Daily    VITAMIN D3 5,000 Units, Daily       Review of Systems   Constitution: Negative.   HENT: Negative.    Eyes: Negative.    Cardiovascular: Positive for dyspnea on exertion and leg swelling.   Respiratory: Positive for cough, shortness of breath, sputum production and wheezing.    Endocrine: Negative.    Hematologic/Lymphatic: Negative.    Skin: Negative.    Musculoskeletal: Positive for arthritis and joint pain.   Gastrointestinal: Negative.    Genitourinary: Negative.    Neurological: Negative.    Psychiatric/Behavioral: Negative.    Allergic/Immunologic: Negative.        /88 (BP Location: Right arm, Patient Position: Sitting, BP Method: Large (Manual))   Pulse 98   Ht 5' 8" (1.727 m)   Wt 76.5 kg (168 lb 10.4 oz)   SpO2 95%   BMI 25.64 kg/m²     Wt Readings from Last 3 Encounters:   07/08/20 76.5 kg (168 lb 10.4 oz)   07/08/20 71 kg (156 lb 8.4 oz)   06/01/20 71.3 kg (157 lb 3 oz)     Temp Readings from Last 3 Encounters:   06/01/20 96.9 °F (36.1 °C) (Tympanic)   10/02/19 99 °F (37.2 °C) (Oral)   10/02/19 99.1 °F (37.3 °C) (Tympanic)     BP Readings from Last 3 Encounters:   07/08/20 130/88   07/08/20 134/81   06/01/20 122/89     Pulse Readings from Last 3 Encounters:   07/08/20 98   07/08/20 94   06/01/20 95          Objective:    Physical Exam   Constitutional: He is oriented to person, place, and time. He appears well-developed and well-nourished.   HENT:   Head: Normocephalic.   Neck: Normal range of motion. Neck supple. Normal carotid pulses, no hepatojugular reflux and no JVD present. Carotid bruit is not present. No thyromegaly present.   Cardiovascular: Normal rate, S1 normal and S2 normal. An irregularly " irregular rhythm present. PMI is not displaced. Exam reveals no S3, no S4, no distant heart sounds, no friction rub, no midsystolic click and no opening snap.   No murmur heard.  Pulses:       Radial pulses are 2+ on the right side and 2+ on the left side.   Pulmonary/Chest: Effort normal and breath sounds normal. He has no wheezes. He has no rales.   Abdominal: Soft. Bowel sounds are normal. He exhibits no distension, no abdominal bruit, no ascites and no mass. There is no abdominal tenderness.   Musculoskeletal:         General: Edema present.   Neurological: He is alert and oriented to person, place, and time.   Skin: Skin is warm.   Psychiatric: He has a normal mood and affect. His behavior is normal.   Nursing note and vitals reviewed.      I have reviewed all pertinent labs and cardiac studies.      Chemistry        Component Value Date/Time     (L) 10/02/2019 1822    K 4.0 10/02/2019 1822     10/02/2019 1822    CO2 24 10/02/2019 1822    BUN 32 (H) 10/02/2019 1822    CREATININE 1.2 10/02/2019 1822     (H) 10/02/2019 1822        Component Value Date/Time    CALCIUM 8.3 (L) 10/02/2019 1822    ALKPHOS 81 10/02/2019 1822    AST 23 10/02/2019 1822    ALT 10 10/02/2019 1822    BILITOT 1.1 (H) 10/02/2019 1822    ESTGFRAFRICA >60 10/02/2019 1822    EGFRNONAA 56 (A) 10/02/2019 1822        Lab Results   Component Value Date    WBC 19.86 (H) 10/02/2019    HGB 10.4 (L) 10/02/2019    HCT 32.0 (L) 10/02/2019    MCV 94 10/02/2019     10/02/2019       Lab Results   Component Value Date    HGBA1C 5.9 10/18/2012     Lab Results   Component Value Date    CHOL 156 07/18/2019    CHOL 159 05/31/2019    CHOL 188 04/09/2018     Lab Results   Component Value Date    HDL 60 07/18/2019    HDL 61 05/31/2019    HDL 72 04/09/2018     Lab Results   Component Value Date    LDLCALC 87.6 07/18/2019    LDLCALC 90.2 05/31/2019    LDLCALC 104.6 04/09/2018     Lab Results   Component Value Date    TRIG 42 07/18/2019     TRIG 39 05/31/2019    TRIG 57 04/09/2018     Lab Results   Component Value Date    CHOLHDL 38.5 07/18/2019    CHOLHDL 38.4 05/31/2019    CHOLHDL 38.3 04/09/2018           Assessment:       1. VENEGAS (dyspnea on exertion)    2. Nonrheumatic tricuspid valve regurgitation    3. PVC's (premature ventricular contractions)    4. Pulmonary hypertension    5. Pulmonary heart disease    6. ZACK (obstructive sleep apnea)    7. On continuous oral anticoagulation    8. Non-rheumatic mitral regurgitation    9. Mixed hyperlipidemia    10. Essential hypertension    11. History of CVA (cerebrovascular accident)    12. Coronary artery disease of native heart with stable angina pectoris, unspecified vessel or lesion type    13. Chronic anticoagulation    14. Chronic ischemic heart disease    15. Bilateral carotid artery stenosis    16. Abnormal ECG    17. Aorto-iliac atherosclerosis    18. Atrial fibrillation, chronic         Plan:               Increased dyspnea/cough and leg edema  Leg edema could be peripheral edema side effect from Norvasc.  Start Lasix 20 mg qd.  CMP, BNP, CBC, TSH.  Covid test.  Echocardiogram.  CXR PA/Lateral.  Continue Xarelto for a fib CVA protection.  Reviewed all tests and above medical conditions with patient in detail and formulated treatment plan.  Continue optimal medical treatment for cardiovascular conditions.  Cardiac low salt diet discussed.  Daily exercise encouraged, as tolerated.  Maintaining healthy weight and weight loss goals (if needed) were discussed in clinic.  Phone review for test results.
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
## 2020-07-08 NOTE — TELEPHONE ENCOUNTER
Wife reports that patient has been SOB.  Spoke with someone earlier.  Has appointment for 2:40 this afternoon.

## 2020-07-09 ENCOUNTER — HOSPITAL ENCOUNTER (OUTPATIENT)
Dept: CARDIOLOGY | Facility: HOSPITAL | Age: 85
Discharge: HOME | End: 2020-07-09
Attending: INTERNAL MEDICINE
Payer: MEDICARE

## 2020-07-09 ENCOUNTER — TELEPHONE (OUTPATIENT)
Dept: CARDIOLOGY | Facility: CLINIC | Age: 85
End: 2020-07-09

## 2020-07-09 VITALS
HEIGHT: 68 IN | DIASTOLIC BLOOD PRESSURE: 88 MMHG | SYSTOLIC BLOOD PRESSURE: 130 MMHG | BODY MASS INDEX: 25.46 KG/M2 | WEIGHT: 168 LBS

## 2020-07-09 DIAGNOSIS — I70.0 AORTO-ILIAC ATHEROSCLEROSIS: ICD-10-CM

## 2020-07-09 DIAGNOSIS — I10 ESSENTIAL HYPERTENSION: ICD-10-CM

## 2020-07-09 DIAGNOSIS — R60.0 EDEMA OF BOTH LEGS: ICD-10-CM

## 2020-07-09 DIAGNOSIS — R06.02 SHORTNESS OF BREATH: ICD-10-CM

## 2020-07-09 DIAGNOSIS — I25.118 CORONARY ARTERY DISEASE OF NATIVE HEART WITH STABLE ANGINA PECTORIS, UNSPECIFIED VESSEL OR LESION TYPE: ICD-10-CM

## 2020-07-09 DIAGNOSIS — R06.09 DOE (DYSPNEA ON EXERTION): ICD-10-CM

## 2020-07-09 DIAGNOSIS — Z79.01 ON CONTINUOUS ORAL ANTICOAGULATION: ICD-10-CM

## 2020-07-09 DIAGNOSIS — I34.0 NON-RHEUMATIC MITRAL REGURGITATION: ICD-10-CM

## 2020-07-09 DIAGNOSIS — I36.1 NONRHEUMATIC TRICUSPID VALVE REGURGITATION: ICD-10-CM

## 2020-07-09 DIAGNOSIS — I25.9 CHRONIC ISCHEMIC HEART DISEASE: ICD-10-CM

## 2020-07-09 DIAGNOSIS — R94.31 ABNORMAL ECG: ICD-10-CM

## 2020-07-09 DIAGNOSIS — I70.8 AORTO-ILIAC ATHEROSCLEROSIS: ICD-10-CM

## 2020-07-09 DIAGNOSIS — I27.20 PULMONARY HYPERTENSION: ICD-10-CM

## 2020-07-09 DIAGNOSIS — Z79.01 CHRONIC ANTICOAGULATION: ICD-10-CM

## 2020-07-09 DIAGNOSIS — G47.33 OSA (OBSTRUCTIVE SLEEP APNEA): ICD-10-CM

## 2020-07-09 DIAGNOSIS — I49.3 PVC'S (PREMATURE VENTRICULAR CONTRACTIONS): ICD-10-CM

## 2020-07-09 DIAGNOSIS — I27.9 PULMONARY HEART DISEASE: ICD-10-CM

## 2020-07-09 DIAGNOSIS — I65.23 BILATERAL CAROTID ARTERY STENOSIS: ICD-10-CM

## 2020-07-09 DIAGNOSIS — Z86.73 HISTORY OF CVA (CEREBROVASCULAR ACCIDENT): ICD-10-CM

## 2020-07-09 DIAGNOSIS — E78.2 MIXED HYPERLIPIDEMIA: ICD-10-CM

## 2020-07-09 DIAGNOSIS — I27.20 PULMONARY HYPERTENSION: Primary | ICD-10-CM

## 2020-07-09 DIAGNOSIS — I48.20 ATRIAL FIBRILLATION, CHRONIC: ICD-10-CM

## 2020-07-09 LAB
ASCENDING AORTA: 3.19 CM
AV INDEX (PROSTH): 0.43
AV MEAN GRADIENT: 5 MMHG
AV PEAK GRADIENT: 8 MMHG
AV VALVE AREA: 1.43 CM2
AV VELOCITY RATIO: 0.45
BSA FOR ECHO PROCEDURE: 1.91 M2
CV ECHO LV RWT: 0.76 CM
DOP CALC AO PEAK VEL: 1.41 M/S
DOP CALC AO VTI: 24.86 CM
DOP CALC LVOT AREA: 3.3 CM2
DOP CALC LVOT DIAMETER: 2.06 CM
DOP CALC LVOT PEAK VEL: 0.63 M/S
DOP CALC LVOT STROKE VOLUME: 35.48 CM3
DOP CALC RVOT PEAK VEL: 0.5 M/S
DOP CALC RVOT VTI: 8.71 CM
DOP CALCLVOT PEAK VEL VTI: 10.65 CM
ECHO LV POSTERIOR WALL: 1.53 CM (ref 0.6–1.1)
FRACTIONAL SHORTENING: 30 % (ref 28–44)
INTERVENTRICULAR SEPTUM: 1.42 CM (ref 0.6–1.1)
IVRT: 70.41 MSEC
LA MAJOR: 5.48 CM
LA MINOR: 5.62 CM
LA WIDTH: 4.22 CM
LEFT ATRIUM SIZE: 4.33 CM
LEFT ATRIUM VOLUME INDEX: 45.4 ML/M2
LEFT ATRIUM VOLUME: 86.19 CM3
LEFT INTERNAL DIMENSION IN SYSTOLE: 2.81 CM (ref 2.1–4)
LEFT VENTRICLE DIASTOLIC VOLUME INDEX: 37.78 ML/M2
LEFT VENTRICLE DIASTOLIC VOLUME: 71.7 ML
LEFT VENTRICLE MASS INDEX: 121 G/M2
LEFT VENTRICLE SYSTOLIC VOLUME INDEX: 15.7 ML/M2
LEFT VENTRICLE SYSTOLIC VOLUME: 29.84 ML
LEFT VENTRICULAR INTERNAL DIMENSION IN DIASTOLE: 4.04 CM (ref 3.5–6)
LEFT VENTRICULAR MASS: 229.89 G
MV PEAK E VEL: 1.09 M/S
PISA TR MAX VEL: 2.79 M/S
PV MEAN GRADIENT: 0.43 MMHG
PV PEAK VELOCITY: 0.89 CM/S
RA MAJOR: 6.16 CM
RA PRESSURE: 15 MMHG
RA WIDTH: 3.74 CM
RIGHT VENTRICULAR END-DIASTOLIC DIMENSION: 3.31 CM
SINUS: 3.05 CM
STJ: 2.98 CM
TR MAX PG: 31 MMHG
TV REST PULMONARY ARTERY PRESSURE: 46 MMHG

## 2020-07-09 PROCEDURE — 93306 TTE W/DOPPLER COMPLETE: CPT | Mod: HCNC

## 2020-07-09 PROCEDURE — 93306 ECHO (CUPID ONLY): ICD-10-PCS | Mod: 26,HCNC,, | Performed by: INTERNAL MEDICINE

## 2020-07-09 PROCEDURE — 93306 TTE W/DOPPLER COMPLETE: CPT | Mod: 26,HCNC,, | Performed by: INTERNAL MEDICINE

## 2020-07-10 NOTE — TELEPHONE ENCOUNTER
Please call pt.  Labs overall stable.  Heart failure blood test slightly elevated.  CXR shows fluid on lungs.  Echo right side of heart weaker.    Needs CTA chest to further evaluate.  Schedule without delay.    Continue Lasix as prescribed.    Will do phone review with CTA.    Dr Stewart

## 2020-07-10 NOTE — TELEPHONE ENCOUNTER
Spoke with wife, Lisa.  CTA scheduled for 7/15 at 3:10 PM at [facility].  Lisa confirmed appointment.

## 2020-07-13 ENCOUNTER — TELEPHONE (OUTPATIENT)
Dept: INTERNAL MEDICINE | Facility: CLINIC | Age: 85
End: 2020-07-13

## 2020-07-13 NOTE — TELEPHONE ENCOUNTER
----- Message from Oanh Segura MA sent at 7/13/2020 11:11 AM CDT -----  Regarding: hospital  Pt wife would like you to know Mr Lindquist is in [facility] with swelling from his waist down.Thank you

## 2020-07-14 ENCOUNTER — TELEPHONE (OUTPATIENT)
Dept: RADIOLOGY | Facility: HOSPITAL | Age: 85
End: 2020-07-14

## 2020-07-16 ENCOUNTER — TELEPHONE (OUTPATIENT)
Dept: INTERNAL MEDICINE | Facility: CLINIC | Age: 85
End: 2020-07-16

## 2020-07-16 NOTE — TELEPHONE ENCOUNTER
----- Message from Marci Loza sent at 7/16/2020  8:22 AM CDT -----  Ele is requesting a call back regarding orders for pt lab before appt on 07/27. Please call back at [phone]

## 2020-07-16 NOTE — TELEPHONE ENCOUNTER
----- Message from Carolina Mcclure sent at 7/16/2020  2:48 PM CDT -----  ..Type:  Patient Returning Call    Who Called: pt   Who Left Message for Patient:  Does the patient know what this is regarding?: order for labs   Would the patient rather a call back or a response via [facility]?  Call back   Best Call Back Number: [phone]  Additional Information: Higinio ( pt wife ) is requesting a call from nurse to have lab order pt in for July 22, 2020 for pt f/u with provider . Mrs Gonzalez ( pt wife ) is also wanted to request a referral for a gastro provider. Due to the chosen is out of network

## 2020-07-16 NOTE — TELEPHONE ENCOUNTER
Patient needs referral to gastro provider.  The other provider was out of network. Cirrhosis of the liver.

## 2020-07-17 DIAGNOSIS — K74.60 HEPATIC CIRRHOSIS, UNSPECIFIED HEPATIC CIRRHOSIS TYPE, UNSPECIFIED WHETHER ASCITES PRESENT: Primary | ICD-10-CM

## 2020-07-17 NOTE — TELEPHONE ENCOUNTER
----- Message from Maximus Regan sent at 7/17/2020  9:59 AM CDT -----  Regarding: Patient Advice  Contact: Lisa Hammond  Pt wife Lisa called and would like  to speak to a nurse regarding a referral   for pt      Lisa can be reached at [phone]

## 2020-07-17 NOTE — TELEPHONE ENCOUNTER
Wife asking the referral be external to Dr. Jossue Barker at [facility]

## 2020-07-21 ENCOUNTER — TELEPHONE (OUTPATIENT)
Dept: INTERNAL MEDICINE | Facility: CLINIC | Age: 85
End: 2020-07-21

## 2020-07-21 NOTE — TELEPHONE ENCOUNTER
----- Message from Dilcia Wells sent at 7/21/2020  7:35 AM CDT -----  Contact: dena-wife  Requesting call back regarding questions on getting help with switching pt cardiologist. Please call back at [phone].

## 2020-07-21 NOTE — TELEPHONE ENCOUNTER
I s/w pt's wife she stated that she found a new cardiologist for pt to see and she would like referral for gastro sent to Dr. Barker office. I informed pt would get referral faxed over. //BJ

## 2020-07-22 ENCOUNTER — OFFICE VISIT (OUTPATIENT)
Dept: INTERNAL MEDICINE | Facility: CLINIC | Age: 85
End: 2020-07-22
Payer: MEDICARE

## 2020-07-22 VITALS
DIASTOLIC BLOOD PRESSURE: 88 MMHG | SYSTOLIC BLOOD PRESSURE: 126 MMHG | BODY MASS INDEX: 24.83 KG/M2 | HEART RATE: 73 BPM | HEIGHT: 68 IN | OXYGEN SATURATION: 95 % | TEMPERATURE: 98 F | WEIGHT: 163.81 LBS

## 2020-07-22 DIAGNOSIS — I48.20 ATRIAL FIBRILLATION, CHRONIC: Chronic | ICD-10-CM

## 2020-07-22 DIAGNOSIS — Z91.81 AT RISK FOR FALLS: ICD-10-CM

## 2020-07-22 DIAGNOSIS — K74.60 HEPATIC CIRRHOSIS, UNSPECIFIED HEPATIC CIRRHOSIS TYPE, UNSPECIFIED WHETHER ASCITES PRESENT: ICD-10-CM

## 2020-07-22 DIAGNOSIS — I10 ESSENTIAL HYPERTENSION: Primary | Chronic | ICD-10-CM

## 2020-07-22 DIAGNOSIS — I87.2 VENOUS INSUFFICIENCY OF BOTH LOWER EXTREMITIES: ICD-10-CM

## 2020-07-22 DIAGNOSIS — R06.09 DOE (DYSPNEA ON EXERTION): ICD-10-CM

## 2020-07-22 DIAGNOSIS — I87.2 VENOUS INSUFFICIENCY OF BOTH LOWER EXTREMITIES: Primary | ICD-10-CM

## 2020-07-22 PROCEDURE — 3074F SYST BP LT 130 MM HG: CPT | Mod: HCNC,CPTII,S$GLB, | Performed by: FAMILY MEDICINE

## 2020-07-22 PROCEDURE — 1100F PR PT FALLS ASSESS DOC 2+ FALLS/FALL W/INJURY/YR: ICD-10-PCS | Mod: HCNC,CPTII,S$GLB, | Performed by: FAMILY MEDICINE

## 2020-07-22 PROCEDURE — 3074F PR MOST RECENT SYSTOLIC BLOOD PRESSURE < 130 MM HG: ICD-10-PCS | Mod: HCNC,CPTII,S$GLB, | Performed by: FAMILY MEDICINE

## 2020-07-22 PROCEDURE — 1126F PR PAIN SEVERITY QUANTIFIED, NO PAIN PRESENT: ICD-10-PCS | Mod: HCNC,S$GLB,, | Performed by: FAMILY MEDICINE

## 2020-07-22 PROCEDURE — 99999 PR PBB SHADOW E&M-EST. PATIENT-LVL IV: ICD-10-PCS | Mod: PBBFAC,HCNC,, | Performed by: FAMILY MEDICINE

## 2020-07-22 PROCEDURE — 3288F FALL RISK ASSESSMENT DOCD: CPT | Mod: HCNC,CPTII,S$GLB, | Performed by: FAMILY MEDICINE

## 2020-07-22 PROCEDURE — 3079F PR MOST RECENT DIASTOLIC BLOOD PRESSURE 80-89 MM HG: ICD-10-PCS | Mod: HCNC,CPTII,S$GLB, | Performed by: FAMILY MEDICINE

## 2020-07-22 PROCEDURE — 3288F PR FALLS RISK ASSESSMENT DOCUMENTED: ICD-10-PCS | Mod: HCNC,CPTII,S$GLB, | Performed by: FAMILY MEDICINE

## 2020-07-22 PROCEDURE — 3079F DIAST BP 80-89 MM HG: CPT | Mod: HCNC,CPTII,S$GLB, | Performed by: FAMILY MEDICINE

## 2020-07-22 PROCEDURE — 99214 OFFICE O/P EST MOD 30 MIN: CPT | Mod: HCNC,S$GLB,, | Performed by: FAMILY MEDICINE

## 2020-07-22 PROCEDURE — 1126F AMNT PAIN NOTED NONE PRSNT: CPT | Mod: HCNC,S$GLB,, | Performed by: FAMILY MEDICINE

## 2020-07-22 PROCEDURE — 99214 PR OFFICE/OUTPT VISIT, EST, LEVL IV, 30-39 MIN: ICD-10-PCS | Mod: HCNC,S$GLB,, | Performed by: FAMILY MEDICINE

## 2020-07-22 PROCEDURE — 99999 PR PBB SHADOW E&M-EST. PATIENT-LVL IV: CPT | Mod: PBBFAC,HCNC,, | Performed by: FAMILY MEDICINE

## 2020-07-22 PROCEDURE — 1100F PTFALLS ASSESS-DOCD GE2>/YR: CPT | Mod: HCNC,CPTII,S$GLB, | Performed by: FAMILY MEDICINE

## 2020-07-22 PROCEDURE — 1159F MED LIST DOCD IN RCRD: CPT | Mod: HCNC,S$GLB,, | Performed by: FAMILY MEDICINE

## 2020-07-22 PROCEDURE — 1159F PR MEDICATION LIST DOCUMENTED IN MEDICAL RECORD: ICD-10-PCS | Mod: HCNC,S$GLB,, | Performed by: FAMILY MEDICINE

## 2020-07-22 RX ORDER — METOPROLOL TARTRATE 25 MG/1
25 TABLET, FILM COATED ORAL 2 TIMES DAILY
COMMUNITY
Start: 2020-07-15 | End: 2020-12-01

## 2020-07-22 RX ORDER — POTASSIUM CHLORIDE 750 MG/1
20 CAPSULE, EXTENDED RELEASE ORAL 2 TIMES DAILY
COMMUNITY
Start: 2020-07-15 | End: 2020-12-01

## 2020-07-22 NOTE — PROGRESS NOTES
Subjective:       Patient ID: Lexa Hammond is a 84 y.o. male.    Chief Complaint: Hospital Follow Up    Pt is a 84 year old with history of rate controlled afib. Got SOB and increase vascular swelling. Went to ER and admitted to [facility]. Pt still SOB with +2 edema in lower extremities. Pt is being followed by cardiology    Review of Systems   Constitutional: Negative.    Respiratory: Positive for shortness of breath.    Cardiovascular: Positive for leg swelling.   Musculoskeletal: Negative.    Psychiatric/Behavioral: Negative.          Objective:      Physical Exam  Constitutional:       Appearance: Normal appearance.   Neck:      Musculoskeletal: Normal range of motion and neck supple.   Cardiovascular:      Rate and Rhythm: Normal rate and regular rhythm.      Pulses: Normal pulses.      Heart sounds: Normal heart sounds.   Pulmonary:      Effort: Pulmonary effort is normal.      Breath sounds: Normal breath sounds. No wheezing or rhonchi.   Skin:     General: Skin is warm and dry.   Neurological:      Mental Status: He is alert.         Assessment:       1. Essential hypertension    2. Atrial fibrillation, chronic    3. Venous insufficiency of both lower extremities    4. Hepatic cirrhosis, unspecified hepatic cirrhosis type, unspecified whether ascites present        Plan:       Essential hypertension  Comments:  BP is stable    Atrial fibrillation, chronic  Comments:  rate controlled.    Venous insufficiency of both lower extremities  Comments:  Still has +2 swelling but Cardiolgy is controlling volume control    Hepatic cirrhosis, unspecified hepatic cirrhosis type, unspecified whether ascites present  Comments:  This is new finding. No Liver enzymes elevated. No imaging findings

## 2020-07-23 ENCOUNTER — TELEPHONE (OUTPATIENT)
Dept: CARDIOLOGY | Facility: CLINIC | Age: 85
End: 2020-07-23

## 2020-07-23 PROBLEM — K74.60 HEPATIC CIRRHOSIS: Status: ACTIVE | Noted: 2020-07-23

## 2020-07-23 NOTE — TELEPHONE ENCOUNTER
Spoke with patient's daughter, Soni to tell her we wish him well and good luck with his new cardiologist.  We shall cancel any future appts and/or tests that he may have with me.  Soni thanked this nurse for the call.

## 2020-07-23 NOTE — TELEPHONE ENCOUNTER
Nursing staff:    Please call patient.  Patient has been followed by [facility] for long time.  He was admitted to [facility] earlier this month.    Per chart review, it is made of note that he no longer wants [facility] care and has requested transfer to new cardiologist outside of [facility].    Please let patient know that we wish him well and good luck with his new cardiologist.  We shall cancel any future appts and/or tests that he may have with me.      Dr Stewart

## 2020-07-28 ENCOUNTER — DOCUMENT SCAN (OUTPATIENT)
Dept: HOME HEALTH SERVICES | Facility: HOSPITAL | Age: 85
End: 2020-07-28
Payer: MEDICARE

## 2020-08-10 ENCOUNTER — TELEPHONE (OUTPATIENT)
Dept: INTERNAL MEDICINE | Facility: CLINIC | Age: 85
End: 2020-08-10

## 2020-08-10 NOTE — TELEPHONE ENCOUNTER
I returned call to Arti with Home Health, she states that she was calling to make sure it was okay for pt to continue with home health for another 4 weeks. She states patient is still SOB and he also has a procedure next week. I informed her that I would verify with Dr. Reyes this is okay and I would give her a return call. //BJ

## 2020-08-10 NOTE — TELEPHONE ENCOUNTER
----- Message from Grace Stewart sent at 8/10/2020  3:13 PM CDT -----  Contact: Arti [facility] -[phone]  Would like to consult with the nurse,  would like to know if the patient can continue Home health, , would like to speak with the nurse concerning this, please call back, thanks sj

## 2020-08-14 ENCOUNTER — TELEPHONE (OUTPATIENT)
Dept: INTERNAL MEDICINE | Facility: CLINIC | Age: 85
End: 2020-08-14

## 2020-08-14 NOTE — TELEPHONE ENCOUNTER
----- Message from Lotus Keller sent at 8/14/2020 12:50 PM CDT -----  Jeanie, with [facility], would like to have order for continuation of physical therapy faxed to [phone].  Please call back at [phone].  Md Joseph

## 2020-08-14 NOTE — TELEPHONE ENCOUNTER
----- Message from Valentina Velasquez sent at 8/14/2020  9:48 AM CDT -----  Regarding: a call regarding his procedure o0n 8/13/20  Contact: dena / wife  Caller is requesting a call back regarding his procedure on 8/13/20.  Please call back at [phone] (home) .  Thanks.

## 2020-08-14 NOTE — TELEPHONE ENCOUNTER
I s/w Jeanie in regards to physical therapy continuation. Verbal order given and she will send something over for provider to sign. //BJ

## 2020-08-17 NOTE — PROGRESS NOTES
Subjective:       Patient ID: Lexa Hammond is a 85 y.o. male.    Chief Complaint: No chief complaint on file.    HPI  Review of Systems      Objective:      Physical Exam    Assessment:       No diagnosis found.    Plan:       ***

## 2020-08-20 ENCOUNTER — DOCUMENT SCAN (OUTPATIENT)
Dept: HOME HEALTH SERVICES | Facility: HOSPITAL | Age: 85
End: 2020-08-20
Payer: MEDICARE

## 2020-08-24 ENCOUNTER — DOCUMENT SCAN (OUTPATIENT)
Dept: HOME HEALTH SERVICES | Facility: HOSPITAL | Age: 85
End: 2020-08-24
Payer: MEDICARE

## 2020-08-26 ENCOUNTER — TELEPHONE (OUTPATIENT)
Dept: INTERNAL MEDICINE | Facility: CLINIC | Age: 85
End: 2020-08-26

## 2020-08-26 ENCOUNTER — DOCUMENT SCAN (OUTPATIENT)
Dept: HOME HEALTH SERVICES | Facility: HOSPITAL | Age: 85
End: 2020-08-26
Payer: MEDICARE

## 2020-08-26 NOTE — TELEPHONE ENCOUNTER
----- Message from Emy Rosenberg sent at 8/26/2020 10:22 AM CDT -----  Contact: Lisa-spouse  iLsa requesting a call back regarding message from Dr. Reyes to request more therapy for pt. Please call Lisa back at [phone]

## 2020-08-26 NOTE — TELEPHONE ENCOUNTER
I returned call to pt's wife, she stated that she was calling to verify if we received fax from cardiologist stating that pt could continue home health. I informed pt's wife that Dr. Reyes had signed orders for pt to have continued home health. She stated that she would give me a return call if there was something that was needed. //BJ

## 2020-08-26 NOTE — TELEPHONE ENCOUNTER
----- Message from Emy Rosenberg sent at 8/26/2020 10:22 AM CDT -----  Contact: Lisa-spouse  Lisa requesting a call back regarding message from Dr. Reyes to request more therapy for pt. Please call Lisa back at [phone]

## 2020-09-09 ENCOUNTER — TELEPHONE (OUTPATIENT)
Dept: CARDIOLOGY | Facility: HOSPITAL | Age: 85
End: 2020-09-09

## 2020-09-09 ENCOUNTER — TELEPHONE (OUTPATIENT)
Dept: INTERNAL MEDICINE | Facility: CLINIC | Age: 85
End: 2020-09-09

## 2020-09-09 NOTE — TELEPHONE ENCOUNTER
----- Message from Carrillo Poncho sent at 9/9/2020  3:55 PM CDT -----  Type:  Needs Medical Advice    Who Called: [facility]   Symptoms (please be specific):    How long has patient had these symptoms:    Pharmacy name and phone #:    Would the patient rather a call back or a response via [facility]? Call back   Best Call Back Number: [phone]  Additional Information: Caller states that she is needing to get orders for open right leg area  Please call if any questions

## 2020-09-10 NOTE — TELEPHONE ENCOUNTER
----- Message from Sanjana Hubbard MA sent at 9/9/2020  4:16 PM CDT -----  Please advise.    Caller states that pt will cont to take Torsemide 20mg 2 tablets a day       ----- Message -----  From: Carrillo Isaac  Sent: 9/9/2020   3:54 PM CDT  To: Terry FELTON Staff    Type:  Needs Medical Advice    Who Called: [facility]   Symptoms (please be specific):    How long has patient had these symptoms:    Pharmacy name and phone #:    Would the patient rather a call back or a response via [facility]? Call back   Best Call Back Number: [phone]  Additional Information: Caller states that pt will cont to take Torsemide 20mg 2 tablets a day

## 2020-09-10 NOTE — TELEPHONE ENCOUNTER
Please call pt.  He is advised to discuss meds with his new cardiologist.  He transferred care to another cardiologist recently.  He is no longer under my care.    Thanks    Dr Stewart

## 2020-09-11 ENCOUNTER — DOCUMENT SCAN (OUTPATIENT)
Dept: HOME HEALTH SERVICES | Facility: HOSPITAL | Age: 85
End: 2020-09-11
Payer: MEDICARE

## 2020-09-11 PROCEDURE — 99499 NO LOS: ICD-10-PCS | Mod: ,,, | Performed by: FAMILY MEDICINE

## 2020-09-11 PROCEDURE — 99499 UNLISTED E&M SERVICE: CPT | Mod: ,,, | Performed by: FAMILY MEDICINE

## 2020-09-15 ENCOUNTER — TELEPHONE (OUTPATIENT)
Dept: INTERNAL MEDICINE | Facility: CLINIC | Age: 85
End: 2020-09-15

## 2020-09-15 NOTE — TELEPHONE ENCOUNTER
----- Message from Cate Causey MA sent at 9/14/2020  4:17 PM CDT -----  Contact: ELSA  Pt daughter is inquiring if forms for Wound Care have been faxed over regarding pt. I did not know exactly where to look for this to try and assist with it. Please Advise.  ----- Message -----  From: Renetta De León  Sent: 9/14/2020   8:15 AM CDT  To: Eric WHITE Staff    Would like to consult with nurse about a wound care nurse please call back [phone]

## 2020-09-18 ENCOUNTER — TELEPHONE (OUTPATIENT)
Dept: INTERNAL MEDICINE | Facility: CLINIC | Age: 85
End: 2020-09-18

## 2020-09-18 NOTE — TELEPHONE ENCOUNTER
I returned call to Hernandez with [facility]. She stated that pt had been discharged due to being admitted to the hospital on September 14th at [facility]. She states that [facility] did not request home health after discharge but wife contact them and requested home health. She stated that pt was in hospital from September 14-17th. Hernandez stated that they had not received anything from [facility] and in order for them to readmit pt they would need a new order for home health. I informed her I would get message sent to Dr. Reyes and give her a return call on Monday. //BJ

## 2020-09-18 NOTE — TELEPHONE ENCOUNTER
----- Message from Sam Reyes MD sent at 9/18/2020  4:16 PM CDT -----  Regarding: RE: order  Yes that if sine  ----- Message -----  From: Ki Farfan MA  Sent: 9/18/2020   4:14 PM CDT  To: Sam Reyes MD  Subject: FW: order                                        Is it okay to readmit pt to home health?  ----- Message -----  From: Yasmeen Martinez  Sent: 9/18/2020  12:32 PM CDT  To: Eric WHITE Staff  Subject: order                                            Good afternoon,        Home health would like to readmit the pt and can be reached at [phone] Yasmeen Mathis

## 2020-09-21 ENCOUNTER — TELEPHONE (OUTPATIENT)
Dept: INTERNAL MEDICINE | Facility: CLINIC | Age: 85
End: 2020-09-21

## 2020-09-21 DIAGNOSIS — I87.2 VENOUS INSUFFICIENCY OF BOTH LOWER EXTREMITIES: ICD-10-CM

## 2020-09-21 DIAGNOSIS — Z86.73 HISTORY OF CVA (CEREBROVASCULAR ACCIDENT): Primary | ICD-10-CM

## 2020-09-21 DIAGNOSIS — I48.20 ATRIAL FIBRILLATION, CHRONIC: Chronic | ICD-10-CM

## 2020-09-21 NOTE — TELEPHONE ENCOUNTER
I returned call to pt's wife in regards to pt's wife I informed her that referral will be faxed over today. Pt's wife stated that she would like to know how does she go about getting a unna boot applied for pt. She stated that if the doctor has to state that it is okay for pt to have she would like to request. Pt's wife stated that pts legs have been swelling to the point that they start oozing fluid. She stated that if home health would be able to do this could Dr. Reyes order. I informed her I would send message to Dr. Reyes. //DARLENE

## 2020-09-21 NOTE — TELEPHONE ENCOUNTER
----- Message from Florida Agosto sent at 9/21/2020  2:49 PM CDT -----  Contact: wife(dena) [phone]  Type:  Patient Returning Call    Who Called: wife( dena)  Who Left Message for Patient: nurse   Does the patient know what this is regarding?: no   Would the patient rather a call back or a response via [facility]?  Call back   Best Call Back Number: [phone]  Additional Information:

## 2020-09-22 ENCOUNTER — OFFICE VISIT (OUTPATIENT)
Dept: INTERNAL MEDICINE | Facility: CLINIC | Age: 85
End: 2020-09-22
Payer: MEDICARE

## 2020-09-22 VITALS
TEMPERATURE: 98 F | DIASTOLIC BLOOD PRESSURE: 88 MMHG | BODY MASS INDEX: 23.69 KG/M2 | SYSTOLIC BLOOD PRESSURE: 118 MMHG | HEIGHT: 68 IN | WEIGHT: 156.31 LBS

## 2020-09-22 DIAGNOSIS — K74.60 HEPATIC CIRRHOSIS, UNSPECIFIED HEPATIC CIRRHOSIS TYPE, UNSPECIFIED WHETHER ASCITES PRESENT: ICD-10-CM

## 2020-09-22 DIAGNOSIS — M79.89 LEFT UPPER EXTREMITY SWELLING: Primary | ICD-10-CM

## 2020-09-22 PROCEDURE — 99999 PR PBB SHADOW E&M-EST. PATIENT-LVL IV: CPT | Mod: PBBFAC,HCNC,, | Performed by: FAMILY MEDICINE

## 2020-09-22 PROCEDURE — 1159F MED LIST DOCD IN RCRD: CPT | Mod: HCNC,S$GLB,, | Performed by: FAMILY MEDICINE

## 2020-09-22 PROCEDURE — 1100F PTFALLS ASSESS-DOCD GE2>/YR: CPT | Mod: HCNC,CPTII,S$GLB, | Performed by: FAMILY MEDICINE

## 2020-09-22 PROCEDURE — 3074F SYST BP LT 130 MM HG: CPT | Mod: HCNC,CPTII,S$GLB, | Performed by: FAMILY MEDICINE

## 2020-09-22 PROCEDURE — 3079F DIAST BP 80-89 MM HG: CPT | Mod: HCNC,CPTII,S$GLB, | Performed by: FAMILY MEDICINE

## 2020-09-22 PROCEDURE — 1125F AMNT PAIN NOTED PAIN PRSNT: CPT | Mod: HCNC,S$GLB,, | Performed by: FAMILY MEDICINE

## 2020-09-22 PROCEDURE — 99213 OFFICE O/P EST LOW 20 MIN: CPT | Mod: HCNC,S$GLB,, | Performed by: FAMILY MEDICINE

## 2020-09-22 PROCEDURE — 3074F PR MOST RECENT SYSTOLIC BLOOD PRESSURE < 130 MM HG: ICD-10-PCS | Mod: HCNC,CPTII,S$GLB, | Performed by: FAMILY MEDICINE

## 2020-09-22 PROCEDURE — 3288F FALL RISK ASSESSMENT DOCD: CPT | Mod: HCNC,CPTII,S$GLB, | Performed by: FAMILY MEDICINE

## 2020-09-22 PROCEDURE — 99999 PR PBB SHADOW E&M-EST. PATIENT-LVL IV: ICD-10-PCS | Mod: PBBFAC,HCNC,, | Performed by: FAMILY MEDICINE

## 2020-09-22 PROCEDURE — 3079F PR MOST RECENT DIASTOLIC BLOOD PRESSURE 80-89 MM HG: ICD-10-PCS | Mod: HCNC,CPTII,S$GLB, | Performed by: FAMILY MEDICINE

## 2020-09-22 PROCEDURE — 3288F PR FALLS RISK ASSESSMENT DOCUMENTED: ICD-10-PCS | Mod: HCNC,CPTII,S$GLB, | Performed by: FAMILY MEDICINE

## 2020-09-22 PROCEDURE — 1100F PR PT FALLS ASSESS DOC 2+ FALLS/FALL W/INJURY/YR: ICD-10-PCS | Mod: HCNC,CPTII,S$GLB, | Performed by: FAMILY MEDICINE

## 2020-09-22 PROCEDURE — 99213 PR OFFICE/OUTPT VISIT, EST, LEVL III, 20-29 MIN: ICD-10-PCS | Mod: HCNC,S$GLB,, | Performed by: FAMILY MEDICINE

## 2020-09-22 PROCEDURE — 1125F PR PAIN SEVERITY QUANTIFIED, PAIN PRESENT: ICD-10-PCS | Mod: HCNC,S$GLB,, | Performed by: FAMILY MEDICINE

## 2020-09-22 PROCEDURE — 1159F PR MEDICATION LIST DOCUMENTED IN MEDICAL RECORD: ICD-10-PCS | Mod: HCNC,S$GLB,, | Performed by: FAMILY MEDICINE

## 2020-09-22 RX ORDER — BUMETANIDE 1 MG/1
TABLET ORAL 2 TIMES DAILY
COMMUNITY
Start: 2020-09-17 | End: 2020-12-01

## 2020-09-22 NOTE — PROGRESS NOTES
Subjective:       Patient ID: Lexa Hammond is a 85 y.o. male.    Chief Complaint: Arm Pain, Swelling (arm), and Bleeding/Bruising (arm)    Pt is a 85 year old in hospital for volume overload 2nd to Afib/HF. On IV diuresis. When IV taken out pt has hematoma with redness down his arm. Can feel his fingers and move them all though feel painful. Area stops at the elbow    Review of Systems   Constitutional: Negative.    Respiratory: Negative.    Cardiovascular: Negative.    Musculoskeletal: Negative.    Psychiatric/Behavioral: Negative.          Objective:      Physical Exam  Constitutional:       Appearance: Normal appearance.   Neck:      Musculoskeletal: Normal range of motion and neck supple.   Cardiovascular:      Rate and Rhythm: Normal rate. Rhythm irregular.      Pulses:           Radial pulses are 1+ on the right side.      Heart sounds: Normal heart sounds. No murmur. No friction rub.   Pulmonary:      Effort: Pulmonary effort is normal.      Breath sounds: Normal breath sounds.   Musculoskeletal:        Arms:    Neurological:      Mental Status: He is alert.   Psychiatric:         Mood and Affect: Mood normal.         Behavior: Behavior normal.         Assessment:       1. Left upper extremity swelling    2. Hepatic cirrhosis, unspecified hepatic cirrhosis type, unspecified whether ascites present        Plan:       Left upper extremity swelling  Comments:  Heron 2nd to IV removal and on xarelto. Will get US to just demonstrate pulses  Orders:  -     US Upper Extremity Arteries Left; Future; Expected date: 09/22/2020    Hepatic cirrhosis, unspecified hepatic cirrhosis type, unspecified whether ascites present  Comments:  Will get US of liver  Orders:  -     US Abdomen Limited_Liver; Future; Expected date: 09/22/2020

## 2020-09-24 ENCOUNTER — HOSPITAL ENCOUNTER (OUTPATIENT)
Dept: RADIOLOGY | Facility: HOSPITAL | Age: 85
Discharge: HOME | End: 2020-09-24
Attending: FAMILY MEDICINE
Payer: MEDICARE

## 2020-09-24 DIAGNOSIS — M79.89 LEFT UPPER EXTREMITY SWELLING: ICD-10-CM

## 2020-09-24 DIAGNOSIS — K74.60 HEPATIC CIRRHOSIS, UNSPECIFIED HEPATIC CIRRHOSIS TYPE, UNSPECIFIED WHETHER ASCITES PRESENT: ICD-10-CM

## 2020-09-24 PROCEDURE — 76705 ECHO EXAM OF ABDOMEN: CPT | Mod: TC,HCNC

## 2020-09-24 PROCEDURE — 93931 UPPER EXTREMITY STUDY: CPT | Mod: TC,HCNC,LT

## 2020-09-28 ENCOUNTER — TELEPHONE (OUTPATIENT)
Dept: INTERNAL MEDICINE | Facility: CLINIC | Age: 85
End: 2020-09-28

## 2020-09-28 NOTE — TELEPHONE ENCOUNTER
----- Message from Carolina Mcclure sent at 9/28/2020  1:51 PM CDT -----  ..Type:  Patient Returning Call    Who Called:  Higinio ( pt wide   Who Left Message for Patient:  Does the patient know what this is regarding? Health concerns   Would the patient rather a call back or a response via [facility]?  Call back   Best Call Back Number: [phone]  Additional Information: Higinio ( pt wife )  Pt is requesting a call from nurse to discuss some concerns with the pt

## 2020-09-29 ENCOUNTER — PATIENT MESSAGE (OUTPATIENT)
Dept: OTHER | Facility: OTHER | Age: 85
End: 2020-09-29

## 2020-09-30 ENCOUNTER — TELEPHONE (OUTPATIENT)
Dept: INTERNAL MEDICINE | Facility: CLINIC | Age: 85
End: 2020-09-30

## 2020-09-30 ENCOUNTER — EXTERNAL HOSPITAL ADMISSION (OUTPATIENT)
Dept: ADMINISTRATIVE | Facility: CLINIC | Age: 85
End: 2020-09-30

## 2020-09-30 ENCOUNTER — PATIENT OUTREACH (OUTPATIENT)
Dept: ADMINISTRATIVE | Facility: CLINIC | Age: 85
End: 2020-09-30

## 2020-09-30 ENCOUNTER — NURSE TRIAGE (OUTPATIENT)
Dept: ADMINISTRATIVE | Facility: CLINIC | Age: 85
End: 2020-09-30

## 2020-09-30 RX ORDER — ISOSORBIDE DINITRATE AND HYDRALAZINE HYDROCHLORIDE 37.5; 2 MG/1; MG/1
0.5 TABLET ORAL 3 TIMES DAILY
COMMUNITY
End: 2020-12-01

## 2020-09-30 NOTE — TELEPHONE ENCOUNTER
I returned call to Marlen she states that pt's BP was 74/59 she states pt is not complaining he's just tired. Pt states that pt is taking bidil started after hospital visit. She states that she home health nurse/PT will go out to see him later today. She states pt has an appointment with Dr. Reyes tomorrow. She also stated that pt's temp and HR was fine Marlen also requested a home health aide to help with ADLs. I informed her I would verify this is okay with Dr. Reyes and give her a return call. //BJ

## 2020-09-30 NOTE — TELEPHONE ENCOUNTER
I s/w ryan informing her that Dr. Reyes stated pt's cardiology needs to be informed about pt's BP, I also informed her that Home health aid is okay to order. Ryan stated that she would get message to Nurse to inform cardiologist. //BJ

## 2020-09-30 NOTE — PROGRESS NOTES
C3 nurse spoke with Lexa Hammond's spouse for a TCC post hospital discharge follow up call; was discharged from [facility] on 9/29. Home Health nurse took b/p which was 73/50 to 83/50 and called Cardiologist office; patient's spouse has not heard back from HH nurse.  Patient triaged.     The patient has a scheduled HOS appointment with Sam Reyes MD on 10/1 @ 8:40.

## 2020-09-30 NOTE — TELEPHONE ENCOUNTER
----- Message from Lima Hoang sent at 9/30/2020  8:48 AM CDT -----  Contact: [facility]  Type:  Needs Medical Advice    Who Called: Marlen  Symptoms (please be specific): States the pt b/p is 74/59, the pt started a new b/p med this morning  How long has patient had these symptoms:  n/a  Pharmacy name and phone #:  n/a  Would the patient rather a call back or a response via [facility]? Call back  Best Call Back Number: [phone] (ask for Geno) or [phone] ( Marlen, the pt Therapist)  Additional Information: n/a

## 2020-09-30 NOTE — TELEPHONE ENCOUNTER
"C3 nurse spoke with Lexa Hammond's spouse for a TCC post hospital discharge follow up call. Patient triaged for low b/p 80/50; was dizzy this morning but not currently.  Instructed spouse to either call 911 or go to the emergency room; spouse would like to wait until physical therapist arrives to assess patient.        Reason for Disposition   [1] Systolic BP < 80 AND [2] NOT dizzy, lightheaded or weak    Additional Information   Negative: Started suddenly after an allergic medicine, an allergic food, or bee sting   Negative: Shock suspected (e.g., cold/pale/clammy skin, too weak to stand, low BP, rapid pulse)   Negative: Difficult to awaken or acting confused (e.g., disoriented, slurred speech)   Negative: Fainted   Negative: [1] Systolic BP < 90 AND [2] dizzy, lightheaded, or weak   Negative: Chest pain   Negative: Bleeding (e.g., vomiting blood, rectal bleeding or tarry stools, severe vaginal bleeding)(Exception: fainted from sight of small amount of blood; small cut or abrasion)   Negative: Extra heart beats or heart is beating fast  (i.e., "palpitations")   Negative: Sounds like a life-threatening emergency to the triager   Negative: Abdominal pain   Negative: Fever > 100.4 F (38.0 C)   Negative: Major surgery in the past month   Negative: [1] Drinking very little AND [2] dehydration suspected (e.g., no urine > 12 hours, very dry mouth, very lightheaded)    Protocols used: LOW BLOOD PRESSURE-A-AH    "

## 2020-09-30 NOTE — PATIENT INSTRUCTIONS
Discharge Instructions for Thoracentesis  Thoracentesis is a procedure that removes extra fluid (pleural effusion) from the pleural space. This space is between the outside surface of the lungs (pleura) and the chest wall. The procedure may be done to take a sample of the fluid for testing to help find the cause. Or it may be done to drain the extra fluid if you are having trouble breathing.  Home care  · You may have some pain after the procedure. Your doctor can prescribe or recommend pain medicine for you to take at home, if needed. Take these exactly as directed. If you stopped taking other medicines before the procedure, ask your doctor when you can start them again.  · Take it easy for 48 hours after the procedure. Don't do anything active until your doctor says its OK.  · Don't do strenuous activities, such as lifting, until your doctor says its OK.  · You will have a small bandage over the puncture site. You may remove the bandage in 24 hours.  · Check the puncture site for the signs of infection listed below.  Follow-up  Make a follow-up appointment with your doctor as directed. During your follow-up visit, your doctor will check your healing. Be sure to let your doctor know how you are feeling.  When to call your doctor  Call your doctor right away if you have any of the following:  · Coughing up blood  · Chest pain. If chest pain suddenly gets worse, it may be an emergency.  · Shortness of breath  · Fever of 100.4°F (38°C) or higher, or as directed by your healthcare provider  · Pain that doesn't get better after taking pain medicine  · Signs of infection at the puncture site. These include increased pain, redness, swelling, or warmth.  · Fluid draining from the puncture site   Date Last Reviewed: 10/1/2016  © 0821-9068 [employer]. [address] [place] [address], [place] [address]. All rights reserved. This information is not intended as a substitute for professional medical care. Always follow  your healthcare professional's instructions.

## 2020-10-01 ENCOUNTER — TELEPHONE (OUTPATIENT)
Dept: INTERNAL MEDICINE | Facility: CLINIC | Age: 85
End: 2020-10-01

## 2020-10-01 ENCOUNTER — OFFICE VISIT (OUTPATIENT)
Dept: INTERNAL MEDICINE | Facility: CLINIC | Age: 85
End: 2020-10-01
Payer: MEDICARE

## 2020-10-01 ENCOUNTER — DOCUMENT SCAN (OUTPATIENT)
Dept: HOME HEALTH SERVICES | Facility: HOSPITAL | Age: 85
End: 2020-10-01
Payer: MEDICARE

## 2020-10-01 VITALS
WEIGHT: 159.63 LBS | SYSTOLIC BLOOD PRESSURE: 84 MMHG | DIASTOLIC BLOOD PRESSURE: 59 MMHG | TEMPERATURE: 98 F | OXYGEN SATURATION: 97 % | HEART RATE: 78 BPM | HEIGHT: 68 IN | BODY MASS INDEX: 24.19 KG/M2

## 2020-10-01 DIAGNOSIS — I48.20 ATRIAL FIBRILLATION, CHRONIC: Chronic | ICD-10-CM

## 2020-10-01 DIAGNOSIS — I10 ESSENTIAL HYPERTENSION: Chronic | ICD-10-CM

## 2020-10-01 DIAGNOSIS — I25.118 CORONARY ARTERY DISEASE OF NATIVE HEART WITH STABLE ANGINA PECTORIS, UNSPECIFIED VESSEL OR LESION TYPE: Primary | Chronic | ICD-10-CM

## 2020-10-01 PROCEDURE — 99999 PR PBB SHADOW E&M-EST. PATIENT-LVL V: CPT | Mod: PBBFAC,HCNC,, | Performed by: FAMILY MEDICINE

## 2020-10-01 PROCEDURE — 99214 PR OFFICE/OUTPT VISIT, EST, LEVL IV, 30-39 MIN: ICD-10-PCS | Mod: HCNC,S$GLB,, | Performed by: FAMILY MEDICINE

## 2020-10-01 PROCEDURE — 1100F PTFALLS ASSESS-DOCD GE2>/YR: CPT | Mod: HCNC,CPTII,S$GLB, | Performed by: FAMILY MEDICINE

## 2020-10-01 PROCEDURE — 1100F PR PT FALLS ASSESS DOC 2+ FALLS/FALL W/INJURY/YR: ICD-10-PCS | Mod: HCNC,CPTII,S$GLB, | Performed by: FAMILY MEDICINE

## 2020-10-01 PROCEDURE — 1159F MED LIST DOCD IN RCRD: CPT | Mod: HCNC,S$GLB,, | Performed by: FAMILY MEDICINE

## 2020-10-01 PROCEDURE — 1126F PR PAIN SEVERITY QUANTIFIED, NO PAIN PRESENT: ICD-10-PCS | Mod: HCNC,S$GLB,, | Performed by: FAMILY MEDICINE

## 2020-10-01 PROCEDURE — 3288F FALL RISK ASSESSMENT DOCD: CPT | Mod: HCNC,CPTII,S$GLB, | Performed by: FAMILY MEDICINE

## 2020-10-01 PROCEDURE — 3288F PR FALLS RISK ASSESSMENT DOCUMENTED: ICD-10-PCS | Mod: HCNC,CPTII,S$GLB, | Performed by: FAMILY MEDICINE

## 2020-10-01 PROCEDURE — 1126F AMNT PAIN NOTED NONE PRSNT: CPT | Mod: HCNC,S$GLB,, | Performed by: FAMILY MEDICINE

## 2020-10-01 PROCEDURE — 99999 PR PBB SHADOW E&M-EST. PATIENT-LVL V: ICD-10-PCS | Mod: PBBFAC,HCNC,, | Performed by: FAMILY MEDICINE

## 2020-10-01 PROCEDURE — 99214 OFFICE O/P EST MOD 30 MIN: CPT | Mod: HCNC,S$GLB,, | Performed by: FAMILY MEDICINE

## 2020-10-01 PROCEDURE — 3078F DIAST BP <80 MM HG: CPT | Mod: HCNC,CPTII,S$GLB, | Performed by: FAMILY MEDICINE

## 2020-10-01 PROCEDURE — 3078F PR MOST RECENT DIASTOLIC BLOOD PRESSURE < 80 MM HG: ICD-10-PCS | Mod: HCNC,CPTII,S$GLB, | Performed by: FAMILY MEDICINE

## 2020-10-01 PROCEDURE — 3074F PR MOST RECENT SYSTOLIC BLOOD PRESSURE < 130 MM HG: ICD-10-PCS | Mod: HCNC,CPTII,S$GLB, | Performed by: FAMILY MEDICINE

## 2020-10-01 PROCEDURE — 1159F PR MEDICATION LIST DOCUMENTED IN MEDICAL RECORD: ICD-10-PCS | Mod: HCNC,S$GLB,, | Performed by: FAMILY MEDICINE

## 2020-10-01 PROCEDURE — 3074F SYST BP LT 130 MM HG: CPT | Mod: HCNC,CPTII,S$GLB, | Performed by: FAMILY MEDICINE

## 2020-10-01 NOTE — TELEPHONE ENCOUNTER
----- Message from Asmita Waite sent at 10/1/2020  7:45 AM CDT -----  Regarding: home health nurse  Contact: Jeanie Martinez, Home health PT  Ms Ware needs to speak to some one regarding [facility] patient, please call her back at [phone]

## 2020-10-01 NOTE — PROGRESS NOTES
Subjective:       Patient ID: Lexa Hammond is a 85 y.o. male.    Chief Complaint: Hospital Follow Up    Pt is a 85 year old who who has cardiac issues resulting in kidney issue and volume regulation. Pressure right now is in the 80's. He was placed on Bidil by non-cardiologist at [facility]. Discussed with pt family appropriateness of this right now given BP. Pt does see Cardiology and Nephrology    Review of Systems   Constitutional: Negative.    Respiratory: Negative.    Cardiovascular: Negative.    Musculoskeletal: Negative.    Hematological: Negative.    Psychiatric/Behavioral: Negative.          Objective:      Physical Exam  Constitutional:       Appearance: Normal appearance.   Neck:      Musculoskeletal: Normal range of motion and neck supple.   Cardiovascular:      Rate and Rhythm: Normal rate and regular rhythm.      Pulses: Normal pulses.      Heart sounds: Normal heart sounds.   Pulmonary:      Effort: Pulmonary effort is normal.      Breath sounds: Normal breath sounds.   Abdominal:      General: Abdomen is flat.      Palpations: Abdomen is soft.   Neurological:      General: No focal deficit present.      Mental Status: He is alert and oriented to person, place, and time.   Psychiatric:         Mood and Affect: Mood normal.         Behavior: Behavior normal.         Assessment:       1. Coronary artery disease of native heart with stable angina pectoris, unspecified vessel or lesion type    2. Atrial fibrillation, chronic    3. Essential hypertension        Plan:       Coronary artery disease of native heart with stable angina pectoris, unspecified vessel or lesion type  Comments:  Pt continue to follow-up with cardiology    Atrial fibrillation, chronic  Comments:  Pt is rate controlled.     Essential hypertension  Comments:  Stop Bidill and monitor pressure

## 2020-10-02 ENCOUNTER — DOCUMENT SCAN (OUTPATIENT)
Dept: HOME HEALTH SERVICES | Facility: HOSPITAL | Age: 85
End: 2020-10-02
Payer: MEDICARE

## 2020-10-14 ENCOUNTER — DOCUMENT SCAN (OUTPATIENT)
Dept: HOME HEALTH SERVICES | Facility: HOSPITAL | Age: 85
End: 2020-10-14
Payer: MEDICARE

## 2020-10-16 ENCOUNTER — PATIENT OUTREACH (OUTPATIENT)
Dept: ADMINISTRATIVE | Facility: HOSPITAL | Age: 85
End: 2020-10-16

## 2020-10-25 ENCOUNTER — PATIENT OUTREACH (OUTPATIENT)
Dept: ADMINISTRATIVE | Facility: CLINIC | Age: 85
End: 2020-10-25

## 2020-10-25 ENCOUNTER — EXTERNAL HOSPITAL ADMISSION (OUTPATIENT)
Dept: ADMINISTRATIVE | Facility: CLINIC | Age: 85
End: 2020-10-25

## 2020-10-25 NOTE — PATIENT INSTRUCTIONS

## 2020-11-04 ENCOUNTER — TELEPHONE (OUTPATIENT)
Dept: INTERNAL MEDICINE | Facility: CLINIC | Age: 85
End: 2020-11-04

## 2020-11-04 RX ORDER — FINASTERIDE 5 MG/1
5 TABLET, FILM COATED ORAL DAILY
Qty: 90 TABLET | Refills: 1 | Status: SHIPPED | OUTPATIENT
Start: 2020-11-04 | End: 2020-11-12 | Stop reason: SDUPTHER

## 2020-11-04 RX ORDER — FINASTERIDE 5 MG/1
5 TABLET, FILM COATED ORAL DAILY
COMMUNITY
Start: 2020-10-23 | End: 2020-11-12

## 2020-11-04 NOTE — TELEPHONE ENCOUNTER
I returned call to pt's wife in regard to medication request, she states that pt was put on Finasteride while in the hospital. Pt's wife stated that she would like to know if Dr. Reyes could send in refill for pt before he leave. I informed pt's wife I would send message to Dr. Reyes. //DARLENE

## 2020-11-12 ENCOUNTER — OFFICE VISIT (OUTPATIENT)
Dept: UROLOGY | Facility: CLINIC | Age: 85
End: 2020-11-12
Payer: MEDICARE

## 2020-11-12 VITALS
TEMPERATURE: 98 F | DIASTOLIC BLOOD PRESSURE: 70 MMHG | WEIGHT: 138 LBS | SYSTOLIC BLOOD PRESSURE: 126 MMHG | BODY MASS INDEX: 20.98 KG/M2

## 2020-11-12 DIAGNOSIS — R33.9 URINARY RETENTION: ICD-10-CM

## 2020-11-12 DIAGNOSIS — N40.0 BENIGN PROSTATIC HYPERPLASIA, UNSPECIFIED WHETHER LOWER URINARY TRACT SYMPTOMS PRESENT: Primary | ICD-10-CM

## 2020-11-12 PROCEDURE — 99214 OFFICE O/P EST MOD 30 MIN: CPT | Mod: HCNC,S$GLB,, | Performed by: UROLOGY

## 2020-11-12 PROCEDURE — 1126F AMNT PAIN NOTED NONE PRSNT: CPT | Mod: HCNC,S$GLB,, | Performed by: UROLOGY

## 2020-11-12 PROCEDURE — 3078F DIAST BP <80 MM HG: CPT | Mod: HCNC,CPTII,S$GLB, | Performed by: UROLOGY

## 2020-11-12 PROCEDURE — 1159F PR MEDICATION LIST DOCUMENTED IN MEDICAL RECORD: ICD-10-PCS | Mod: HCNC,S$GLB,, | Performed by: UROLOGY

## 2020-11-12 PROCEDURE — 1126F PR PAIN SEVERITY QUANTIFIED, NO PAIN PRESENT: ICD-10-PCS | Mod: HCNC,S$GLB,, | Performed by: UROLOGY

## 2020-11-12 PROCEDURE — 99214 PR OFFICE/OUTPT VISIT, EST, LEVL IV, 30-39 MIN: ICD-10-PCS | Mod: HCNC,S$GLB,, | Performed by: UROLOGY

## 2020-11-12 PROCEDURE — 3074F PR MOST RECENT SYSTOLIC BLOOD PRESSURE < 130 MM HG: ICD-10-PCS | Mod: HCNC,CPTII,S$GLB, | Performed by: UROLOGY

## 2020-11-12 PROCEDURE — 1159F MED LIST DOCD IN RCRD: CPT | Mod: HCNC,S$GLB,, | Performed by: UROLOGY

## 2020-11-12 PROCEDURE — 3074F SYST BP LT 130 MM HG: CPT | Mod: HCNC,CPTII,S$GLB, | Performed by: UROLOGY

## 2020-11-12 PROCEDURE — 3078F PR MOST RECENT DIASTOLIC BLOOD PRESSURE < 80 MM HG: ICD-10-PCS | Mod: HCNC,CPTII,S$GLB, | Performed by: UROLOGY

## 2020-11-12 PROCEDURE — 99999 PR PBB SHADOW E&M-EST. PATIENT-LVL III: ICD-10-PCS | Mod: PBBFAC,HCNC,, | Performed by: UROLOGY

## 2020-11-12 PROCEDURE — 99999 PR PBB SHADOW E&M-EST. PATIENT-LVL III: CPT | Mod: PBBFAC,HCNC,, | Performed by: UROLOGY

## 2020-11-12 RX ORDER — FINASTERIDE 5 MG/1
5 TABLET, FILM COATED ORAL DAILY
Qty: 90 TABLET | Refills: 3 | Status: SHIPPED | OUTPATIENT
Start: 2020-11-12 | End: 2021-11-12

## 2020-11-12 RX ORDER — TAMSULOSIN HYDROCHLORIDE 0.4 MG/1
0.4 CAPSULE ORAL DAILY
Qty: 90 CAPSULE | Refills: 3 | Status: SHIPPED | OUTPATIENT
Start: 2020-11-12 | End: 2021-11-12
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
## 2020-11-16 ENCOUNTER — CLINICAL SUPPORT (OUTPATIENT)
Dept: UROLOGY | Facility: CLINIC | Age: 85
End: 2020-11-16
Payer: MEDICARE

## 2020-11-16 DIAGNOSIS — R33.9 URINARY RETENTION: Primary | ICD-10-CM

## 2020-11-16 LAB — POC RESIDUAL URINE VOLUME: 0 ML (ref 0–100)

## 2020-11-16 PROCEDURE — 51798 US URINE CAPACITY MEASURE: CPT | Mod: HCNC,S$GLB,, | Performed by: UROLOGY

## 2020-11-16 PROCEDURE — 51798 POCT BLADDER SCAN: ICD-10-PCS | Mod: HCNC,S$GLB,, | Performed by: UROLOGY

## 2020-11-16 PROCEDURE — 99999 PR PBB SHADOW E&M-EST. PATIENT-LVL II: ICD-10-PCS | Mod: PBBFAC,HCNC,,

## 2020-11-16 PROCEDURE — 99999 PR PBB SHADOW E&M-EST. PATIENT-LVL II: CPT | Mod: PBBFAC,HCNC,,

## 2020-12-01 ENCOUNTER — OFFICE VISIT (OUTPATIENT)
Dept: INTERNAL MEDICINE | Facility: CLINIC | Age: 85
End: 2020-12-01
Payer: MEDICARE

## 2020-12-01 VITALS
HEIGHT: 68 IN | HEART RATE: 80 BPM | TEMPERATURE: 96 F | SYSTOLIC BLOOD PRESSURE: 100 MMHG | OXYGEN SATURATION: 96 % | WEIGHT: 156.06 LBS | BODY MASS INDEX: 23.65 KG/M2 | DIASTOLIC BLOOD PRESSURE: 78 MMHG

## 2020-12-01 DIAGNOSIS — Z79.01 CHRONIC ANTICOAGULATION: ICD-10-CM

## 2020-12-01 DIAGNOSIS — K74.60 HEPATIC CIRRHOSIS, UNSPECIFIED HEPATIC CIRRHOSIS TYPE, UNSPECIFIED WHETHER ASCITES PRESENT: ICD-10-CM

## 2020-12-01 DIAGNOSIS — I10 ESSENTIAL HYPERTENSION: Primary | ICD-10-CM

## 2020-12-01 DIAGNOSIS — I48.20 ATRIAL FIBRILLATION, CHRONIC: ICD-10-CM

## 2020-12-01 DIAGNOSIS — I87.2 VENOUS INSUFFICIENCY OF BOTH LOWER EXTREMITIES: ICD-10-CM

## 2020-12-01 DIAGNOSIS — E78.2 MIXED HYPERLIPIDEMIA: ICD-10-CM

## 2020-12-01 DIAGNOSIS — I65.23 BILATERAL CAROTID ARTERY STENOSIS: ICD-10-CM

## 2020-12-01 DIAGNOSIS — N40.1 BENIGN PROSTATIC HYPERPLASIA WITH URINARY FREQUENCY: ICD-10-CM

## 2020-12-01 DIAGNOSIS — E46 PROTEIN-CALORIE MALNUTRITION, UNSPECIFIED SEVERITY: ICD-10-CM

## 2020-12-01 DIAGNOSIS — I25.118 CORONARY ARTERY DISEASE OF NATIVE HEART WITH STABLE ANGINA PECTORIS, UNSPECIFIED VESSEL OR LESION TYPE: ICD-10-CM

## 2020-12-01 DIAGNOSIS — Z86.73 HISTORY OF CVA (CEREBROVASCULAR ACCIDENT): ICD-10-CM

## 2020-12-01 DIAGNOSIS — R35.0 BENIGN PROSTATIC HYPERPLASIA WITH URINARY FREQUENCY: ICD-10-CM

## 2020-12-01 DIAGNOSIS — R60.0 EDEMA OF BOTH LEGS: ICD-10-CM

## 2020-12-01 PROBLEM — I50.32 CHRONIC HEART FAILURE WITH PRESERVED EJECTION FRACTION: Chronic | Status: ACTIVE | Noted: 2020-07-22

## 2020-12-01 PROBLEM — I50.32 CHRONIC HEART FAILURE WITH PRESERVED EJECTION FRACTION: Status: ACTIVE | Noted: 2020-07-22

## 2020-12-01 PROBLEM — M81.0 OSTEOPOROSIS: Status: ACTIVE | Noted: 2020-07-22

## 2020-12-01 PROCEDURE — 1100F PTFALLS ASSESS-DOCD GE2>/YR: CPT | Mod: HCNC,CPTII,S$GLB, | Performed by: FAMILY MEDICINE

## 2020-12-01 PROCEDURE — 99499 RISK ADDL DX/OHS AUDIT: ICD-10-PCS | Mod: S$GLB,,, | Performed by: FAMILY MEDICINE

## 2020-12-01 PROCEDURE — 99214 PR OFFICE/OUTPT VISIT, EST, LEVL IV, 30-39 MIN: ICD-10-PCS | Mod: HCNC,S$GLB,, | Performed by: FAMILY MEDICINE

## 2020-12-01 PROCEDURE — 1100F PR PT FALLS ASSESS DOC 2+ FALLS/FALL W/INJURY/YR: ICD-10-PCS | Mod: HCNC,CPTII,S$GLB, | Performed by: FAMILY MEDICINE

## 2020-12-01 PROCEDURE — 1159F PR MEDICATION LIST DOCUMENTED IN MEDICAL RECORD: ICD-10-PCS | Mod: HCNC,S$GLB,, | Performed by: FAMILY MEDICINE

## 2020-12-01 PROCEDURE — 3078F PR MOST RECENT DIASTOLIC BLOOD PRESSURE < 80 MM HG: ICD-10-PCS | Mod: HCNC,CPTII,S$GLB, | Performed by: FAMILY MEDICINE

## 2020-12-01 PROCEDURE — 99214 OFFICE O/P EST MOD 30 MIN: CPT | Mod: HCNC,S$GLB,, | Performed by: FAMILY MEDICINE

## 2020-12-01 PROCEDURE — 1126F PR PAIN SEVERITY QUANTIFIED, NO PAIN PRESENT: ICD-10-PCS | Mod: HCNC,S$GLB,, | Performed by: FAMILY MEDICINE

## 2020-12-01 PROCEDURE — 99499 UNLISTED E&M SERVICE: CPT | Mod: S$GLB,,, | Performed by: FAMILY MEDICINE

## 2020-12-01 PROCEDURE — 3288F PR FALLS RISK ASSESSMENT DOCUMENTED: ICD-10-PCS | Mod: HCNC,CPTII,S$GLB, | Performed by: FAMILY MEDICINE

## 2020-12-01 PROCEDURE — 3288F FALL RISK ASSESSMENT DOCD: CPT | Mod: HCNC,CPTII,S$GLB, | Performed by: FAMILY MEDICINE

## 2020-12-01 PROCEDURE — 1126F AMNT PAIN NOTED NONE PRSNT: CPT | Mod: HCNC,S$GLB,, | Performed by: FAMILY MEDICINE

## 2020-12-01 PROCEDURE — 3074F PR MOST RECENT SYSTOLIC BLOOD PRESSURE < 130 MM HG: ICD-10-PCS | Mod: HCNC,CPTII,S$GLB, | Performed by: FAMILY MEDICINE

## 2020-12-01 PROCEDURE — 1159F MED LIST DOCD IN RCRD: CPT | Mod: HCNC,S$GLB,, | Performed by: FAMILY MEDICINE

## 2020-12-01 PROCEDURE — 3074F SYST BP LT 130 MM HG: CPT | Mod: HCNC,CPTII,S$GLB, | Performed by: FAMILY MEDICINE

## 2020-12-01 PROCEDURE — 3078F DIAST BP <80 MM HG: CPT | Mod: HCNC,CPTII,S$GLB, | Performed by: FAMILY MEDICINE

## 2020-12-01 PROCEDURE — 99999 PR PBB SHADOW E&M-EST. PATIENT-LVL III: ICD-10-PCS | Mod: PBBFAC,HCNC,, | Performed by: FAMILY MEDICINE

## 2020-12-01 PROCEDURE — 99999 PR PBB SHADOW E&M-EST. PATIENT-LVL III: CPT | Mod: PBBFAC,HCNC,, | Performed by: FAMILY MEDICINE

## 2020-12-01 RX ORDER — METOLAZONE 5 MG/1
TABLET ORAL
COMMUNITY
Start: 2020-11-20 | End: 2020-12-01

## 2020-12-01 RX ORDER — FUROSEMIDE 80 MG/1
80 TABLET ORAL 2 TIMES DAILY
COMMUNITY
End: 2020-12-01 | Stop reason: SDUPTHER

## 2020-12-01 RX ORDER — FUROSEMIDE 80 MG/1
80 TABLET ORAL 2 TIMES DAILY
Qty: 180 TABLET | Refills: 3 | Status: SHIPPED | OUTPATIENT
Start: 2020-12-01 | End: 2021-12-01

## 2020-12-01 RX ORDER — POTASSIUM CHLORIDE 20 MEQ/1
20 TABLET, EXTENDED RELEASE ORAL 2 TIMES DAILY
COMMUNITY

## 2020-12-01 NOTE — PROGRESS NOTES
Subjective:   Patient ID: Lexa Hammond is a 85 y.o. male.  Chief Complaint:  Establish Care    Previous PCP Dr. Reyes.    Followed by cardiologist at [facility].    Reviewed and updated med list today.    Significant cardiovascular history, but appears overall stable on his Toprol XL 12.5 mg daily, aspirin 81 mg daily, Lasix 80 mg twice a day, potassium 20 mEq twice a day, and Xarelto 15 mg daily.    Also with frequent urination at night and BPH symptoms evaluated by urology.  Some improvement with Proscar 5 mg daily and Flomax 0.4 mg daily.  Previously on Boniva from Dr. Reyes for osteoporosis, questions if they should restart.  Last lipid panel with LDL 88 and stable.    Previous Zostavax, but no Shingrix vaccines   Reports 2 if not 3 previous pneumonia vaccines.  Patient is examined with daughter in the room.    Neither have any new complaints.      Current Outpatient Medications:     aspirin (ECOTRIN) 81 MG EC tablet, Take 81 mg by mouth once daily., Disp: , Rfl:     finasteride (PROSCAR) 5 mg tablet, Take 1 tablet (5 mg total) by mouth once daily., Disp: 90 tablet, Rfl: 3    furosemide (LASIX) 80 MG tablet, Take 1 tablet (80 mg total) by mouth 2 (two) times daily., Disp: 180 tablet, Rfl: 3    metoprolol succinate (TOPROL-XL) 12.5 MG 24 hr split tablet, Take 12.5 mg by mouth once daily., Disp: , Rfl:     potassium chloride SA (K-DUR,KLOR-CON) 20 MEQ tablet, Take 20 mEq by mouth 2 (two) times daily., Disp: , Rfl:     rivaroxaban (XARELTO) 15 mg Tab, Take 15 mg by mouth daily with dinner or evening meal., Disp: , Rfl:     tamsulosin (FLOMAX) 0.4 mg Cap, Take 1 capsule (0.4 mg total) by mouth once daily., Disp: 90 capsule, Rfl: 3    Review of Systems   Constitutional: Negative for chills, diaphoresis, fatigue, fever and unexpected weight change.   Eyes: Negative for visual disturbance.   Respiratory: Positive for shortness of breath (Chronic). Negative for cough, chest tightness and wheezing.   "  Cardiovascular: Negative for chest pain, palpitations and leg swelling (Chronic).   Gastrointestinal: Positive for abdominal distention (Chronic). Negative for abdominal pain, constipation, diarrhea, nausea and vomiting.   Endocrine: Positive for polyuria. Negative for cold intolerance, heat intolerance, polydipsia and polyphagia.   Genitourinary: Positive for frequency and urgency. Negative for decreased urine volume, difficulty urinating, discharge, dysuria, enuresis, flank pain, genital sores, hematuria, penile pain, penile swelling, scrotal swelling and testicular pain.   Musculoskeletal: Negative for myalgias and neck pain.   Skin: Negative for rash.   Neurological: Negative for dizziness, tremors, syncope, weakness, light-headedness, numbness and headaches.   Hematological: Does not bruise/bleed easily.   Psychiatric/Behavioral: Negative for sleep disturbance. The patient is not nervous/anxious.      Objective:   /78 (BP Location: Left arm, Patient Position: Sitting, BP Method: Medium (Manual))   Pulse 80   Temp 96.1 °F (35.6 °C) (Tympanic)   Ht 5' 8" (1.727 m)   Wt 70.8 kg (156 lb 1.4 oz)   SpO2 96%   BMI 23.73 kg/m²     Physical Exam  Vitals signs and nursing note reviewed.   Constitutional:       General: He is not in acute distress.     Appearance: Normal appearance. He is well-developed. He is cachectic. He is ill-appearing. He is not toxic-appearing or diaphoretic.   Eyes:      General: No scleral icterus.        Right eye: No discharge.         Left eye: No discharge.      Conjunctiva/sclera: Conjunctivae normal.      Right eye: Right conjunctiva is not injected.      Left eye: Left conjunctiva is not injected.   Neck:      Thyroid: No thyroid mass, thyromegaly or thyroid tenderness.      Vascular: No carotid bruit or JVD.   Cardiovascular:      Rate and Rhythm: Normal rate and regular rhythm.      Pulses:           Radial pulses are 2+ on the right side and 2+ on the left side.      Heart " sounds: Normal heart sounds. No murmur. No friction rub. No gallop.    Pulmonary:      Effort: Pulmonary effort is normal.      Breath sounds: Normal breath sounds. No wheezing, rhonchi or rales.   Abdominal:      General: There is no distension.      Palpations: Abdomen is soft.      Tenderness: There is no abdominal tenderness. There is no guarding or rebound.   Musculoskeletal:      Right lower le+ Edema present.      Left lower le+ Edema present.   Skin:     General: Skin is warm and dry.      Findings: No rash.   Neurological:      Mental Status: He is alert.      Gait: Gait abnormal.   Psychiatric:         Attention and Perception: Attention and perception normal. He is attentive. He does not perceive auditory or visual hallucinations.         Mood and Affect: Mood and affect normal.         Speech: Speech normal.         Behavior: Behavior is slowed and withdrawn. Behavior is cooperative.         Thought Content: Thought content normal.         Cognition and Memory: Cognition is impaired. Memory is impaired.         Judgment: Judgment normal.       Assessment:       ICD-10-CM ICD-9-CM   1. Essential hypertension  I10 401.9   2. Atrial fibrillation, chronic  I48.20 427.31   3. History of CVA (cerebrovascular accident)  Z86.73 V12.54   4. Chronic anticoagulation  Z79.01 V58.61   5. Mixed hyperlipidemia  E78.2 272.2   6. Bilateral carotid artery stenosis  I65.23 433.10     433.30   7. Coronary artery disease of native heart with stable angina pectoris, unspecified vessel or lesion type  I25.118 414.01     413.9   8. Hepatic cirrhosis, unspecified hepatic cirrhosis type, unspecified whether ascites present  K74.60 571.5   9. Protein-calorie malnutrition, unspecified severity  E46 263.9   10. Venous insufficiency of both lower extremities  I87.2 459.81   11. Edema of both legs  R60.0 782.3   12. Benign prostatic hyperplasia with urinary frequency  N40.1 600.01    R35.0 788.41     Plan:   Essential  hypertension  Atrial fibrillation, chronic  History of CVA (cerebrovascular accident)  Chronic anticoagulation  Mixed hyperlipidemia  Bilateral carotid artery stenosis  Coronary artery disease of native heart with stable angina pectoris, unspecified vessel or lesion type  Clinically, symptomatic leak, and based on exam controlled and stable.    Continue Toprol-XL 12.5 mg daily   Continue aspirin 81 mg daily.  Continue Xarelto 15 mg daily  Follow-up cardiology as scheduled     Hepatic cirrhosis, unspecified hepatic cirrhosis type, unspecified whether ascites present  Protein-calorie malnutrition, unspecified severity  Venous insufficiency of both lower extremities  Edema of both legs  -     furosemide (LASIX) 80 MG tablet; Take 1 tablet (80 mg total) by mouth 2 (two) times daily.  Dispense: 180 tablet; Refill: 3  Clinically, symptomatic leak, and based on exam controlled and stable.    Continue Lasix 80 mg twice a day   Continue potassium 20 mEq twice a day    Benign prostatic hyperplasia with urinary frequency  Unfortunately still with significant symptoms despite treatment.    Continue Flomax 0.4 mg daily   Continue Proscar 5 mg daily.    Follow-up urology as scheduled.      Reported tetanus booster up-to-date  Reported pneumonia vaccines up-to-date  Had flu vaccine   Will consider Shingrix vaccine through pharmacy     Follow-up all specialist as scheduled.    Return to clinic 6 months or sooner as needed.

## 2020-12-08 ENCOUNTER — PATIENT OUTREACH (OUTPATIENT)
Dept: ADMINISTRATIVE | Facility: CLINIC | Age: 85
End: 2020-12-08

## 2020-12-08 ENCOUNTER — EXTERNAL HOSPITAL ADMISSION (OUTPATIENT)
Dept: ADMINISTRATIVE | Facility: CLINIC | Age: 85
End: 2020-12-08

## 2020-12-11 ENCOUNTER — PATIENT MESSAGE (OUTPATIENT)
Dept: OTHER | Facility: OTHER | Age: 85
End: 2020-12-11

## 2020-12-11 ENCOUNTER — PATIENT OUTREACH (OUTPATIENT)
Dept: ADMINISTRATIVE | Facility: CLINIC | Age: 85
End: 2020-12-11

## 2020-12-11 NOTE — PROGRESS NOTES
C3 nurse attempted to contact patient. The following occurred:   C3 nurse attempted to contact Lexa Lernerudreaux for a TCC post hospital discharge follow up call. The patient is unable to conduct the call @ this time. The patient requested a callback.    The patient does not have a scheduled HOSFU appointment within 7-14 days post hospital discharge date 12/10/20. Message sent to Physician staff to assist with HOSFU appointment scheduling.

## 2021-01-28 ENCOUNTER — PES CALL (OUTPATIENT)
Dept: ADMINISTRATIVE | Facility: CLINIC | Age: 86
End: 2021-01-28
